# Patient Record
Sex: MALE | Race: BLACK OR AFRICAN AMERICAN | NOT HISPANIC OR LATINO
[De-identification: names, ages, dates, MRNs, and addresses within clinical notes are randomized per-mention and may not be internally consistent; named-entity substitution may affect disease eponyms.]

---

## 2017-01-10 ENCOUNTER — APPOINTMENT (OUTPATIENT)
Dept: THORACIC SURGERY | Facility: CLINIC | Age: 62
End: 2017-01-10

## 2017-01-10 VITALS
DIASTOLIC BLOOD PRESSURE: 80 MMHG | BODY MASS INDEX: 30.37 KG/M2 | HEIGHT: 66 IN | SYSTOLIC BLOOD PRESSURE: 140 MMHG | OXYGEN SATURATION: 98 % | RESPIRATION RATE: 12 BRPM | HEART RATE: 64 BPM | WEIGHT: 189 LBS

## 2017-01-28 ENCOUNTER — EMERGENCY (EMERGENCY)
Facility: HOSPITAL | Age: 62
LOS: 1 days | Discharge: HOME | End: 2017-01-28
Attending: EMERGENCY MEDICINE | Admitting: EMERGENCY MEDICINE
Payer: MEDICARE

## 2017-01-28 VITALS
RESPIRATION RATE: 18 BRPM | OXYGEN SATURATION: 98 % | TEMPERATURE: 98 F | SYSTOLIC BLOOD PRESSURE: 169 MMHG | HEART RATE: 84 BPM | DIASTOLIC BLOOD PRESSURE: 87 MMHG

## 2017-01-28 DIAGNOSIS — Z98.89 OTHER SPECIFIED POSTPROCEDURAL STATES: Chronic | ICD-10-CM

## 2017-01-28 LAB
ALBUMIN SERPL ELPH-MCNC: 4.2 G/DL — SIGNIFICANT CHANGE UP (ref 3.3–5)
ALP SERPL-CCNC: 73 U/L — SIGNIFICANT CHANGE UP (ref 40–120)
ALT FLD-CCNC: 28 U/L — SIGNIFICANT CHANGE UP (ref 4–41)
AST SERPL-CCNC: 23 U/L — SIGNIFICANT CHANGE UP (ref 4–40)
BASOPHILS # BLD AUTO: 0.04 K/UL — SIGNIFICANT CHANGE UP (ref 0–0.2)
BASOPHILS NFR BLD AUTO: 0.4 % — SIGNIFICANT CHANGE UP (ref 0–2)
BILIRUB SERPL-MCNC: 0.2 MG/DL — SIGNIFICANT CHANGE UP (ref 0.2–1.2)
BUN SERPL-MCNC: 11 MG/DL — SIGNIFICANT CHANGE UP (ref 7–23)
CALCIUM SERPL-MCNC: 9.3 MG/DL — SIGNIFICANT CHANGE UP (ref 8.4–10.5)
CHLORIDE SERPL-SCNC: 103 MMOL/L — SIGNIFICANT CHANGE UP (ref 98–107)
CK MB BLD-MCNC: 0.7 — SIGNIFICANT CHANGE UP (ref 0–2.5)
CK MB BLD-MCNC: 1.27 NG/ML — SIGNIFICANT CHANGE UP (ref 1–6.6)
CK SERPL-CCNC: 190 U/L — SIGNIFICANT CHANGE UP (ref 30–200)
CO2 SERPL-SCNC: 23 MMOL/L — SIGNIFICANT CHANGE UP (ref 22–31)
CREAT SERPL-MCNC: 0.89 MG/DL — SIGNIFICANT CHANGE UP (ref 0.5–1.3)
EOSINOPHIL # BLD AUTO: 0.41 K/UL — SIGNIFICANT CHANGE UP (ref 0–0.5)
EOSINOPHIL NFR BLD AUTO: 4.4 % — SIGNIFICANT CHANGE UP (ref 0–6)
GLUCOSE SERPL-MCNC: 93 MG/DL — SIGNIFICANT CHANGE UP (ref 70–99)
HCT VFR BLD CALC: 39.9 % — SIGNIFICANT CHANGE UP (ref 39–50)
HGB BLD-MCNC: 12.6 G/DL — LOW (ref 13–17)
IMM GRANULOCYTES NFR BLD AUTO: 0.3 % — SIGNIFICANT CHANGE UP (ref 0–1.5)
LYMPHOCYTES # BLD AUTO: 3.26 K/UL — SIGNIFICANT CHANGE UP (ref 1–3.3)
LYMPHOCYTES # BLD AUTO: 35.2 % — SIGNIFICANT CHANGE UP (ref 13–44)
MCHC RBC-ENTMCNC: 26.2 PG — LOW (ref 27–34)
MCHC RBC-ENTMCNC: 31.6 % — LOW (ref 32–36)
MCV RBC AUTO: 83 FL — SIGNIFICANT CHANGE UP (ref 80–100)
MONOCYTES # BLD AUTO: 0.78 K/UL — SIGNIFICANT CHANGE UP (ref 0–0.9)
MONOCYTES NFR BLD AUTO: 8.4 % — SIGNIFICANT CHANGE UP (ref 2–14)
NEUTROPHILS # BLD AUTO: 4.75 K/UL — SIGNIFICANT CHANGE UP (ref 1.8–7.4)
NEUTROPHILS NFR BLD AUTO: 51.3 % — SIGNIFICANT CHANGE UP (ref 43–77)
PLATELET # BLD AUTO: 270 K/UL — SIGNIFICANT CHANGE UP (ref 150–400)
PMV BLD: 9.8 FL — SIGNIFICANT CHANGE UP (ref 7–13)
POTASSIUM SERPL-MCNC: 3.6 MMOL/L — SIGNIFICANT CHANGE UP (ref 3.5–5.3)
POTASSIUM SERPL-SCNC: 3.6 MMOL/L — SIGNIFICANT CHANGE UP (ref 3.5–5.3)
PROT SERPL-MCNC: 7.3 G/DL — SIGNIFICANT CHANGE UP (ref 6–8.3)
RBC # BLD: 4.81 M/UL — SIGNIFICANT CHANGE UP (ref 4.2–5.8)
RBC # FLD: 14.3 % — SIGNIFICANT CHANGE UP (ref 10.3–14.5)
SODIUM SERPL-SCNC: 143 MMOL/L — SIGNIFICANT CHANGE UP (ref 135–145)
TROPONIN T SERPL-MCNC: < 0.06 NG/ML — SIGNIFICANT CHANGE UP (ref 0–0.06)
WBC # BLD: 9.27 K/UL — SIGNIFICANT CHANGE UP (ref 3.8–10.5)
WBC # FLD AUTO: 9.27 K/UL — SIGNIFICANT CHANGE UP (ref 3.8–10.5)

## 2017-01-28 PROCEDURE — 93010 ELECTROCARDIOGRAM REPORT: CPT | Mod: 59,GC

## 2017-01-28 PROCEDURE — 99284 EMERGENCY DEPT VISIT MOD MDM: CPT | Mod: 25,GC

## 2017-01-28 PROCEDURE — 71020: CPT | Mod: 26

## 2017-01-28 RX ORDER — ACETAMINOPHEN 500 MG
650 TABLET ORAL ONCE
Qty: 0 | Refills: 0 | Status: COMPLETED | OUTPATIENT
Start: 2017-01-28 | End: 2017-01-28

## 2017-01-28 RX ORDER — IPRATROPIUM/ALBUTEROL SULFATE 18-103MCG
3 AEROSOL WITH ADAPTER (GRAM) INHALATION ONCE
Qty: 0 | Refills: 0 | Status: COMPLETED | OUTPATIENT
Start: 2017-01-28 | End: 2017-01-28

## 2017-01-28 RX ORDER — NITROGLYCERIN 6.5 MG
0.4 CAPSULE, EXTENDED RELEASE ORAL ONCE
Qty: 0 | Refills: 0 | Status: COMPLETED | OUTPATIENT
Start: 2017-01-28 | End: 2017-01-28

## 2017-01-28 RX ORDER — AZITHROMYCIN 500 MG/1
500 TABLET, FILM COATED ORAL ONCE
Qty: 0 | Refills: 0 | Status: COMPLETED | OUTPATIENT
Start: 2017-01-28 | End: 2017-01-28

## 2017-01-28 RX ADMIN — AZITHROMYCIN 500 MILLIGRAM(S): 500 TABLET, FILM COATED ORAL at 20:39

## 2017-01-28 RX ADMIN — Medication 0.4 MILLIGRAM(S): at 23:44

## 2017-01-28 RX ADMIN — Medication 3 MILLILITER(S): at 19:45

## 2017-01-28 RX ADMIN — Medication 650 MILLIGRAM(S): at 19:45

## 2017-01-28 RX ADMIN — Medication 650 MILLIGRAM(S): at 23:59

## 2017-01-28 NOTE — ED PROVIDER NOTE - CARE PLAN
Principal Discharge DX:	Viral upper respiratory tract infection Principal Discharge DX:	Chest pain  Secondary Diagnosis:	URTI (acute upper respiratory infection)

## 2017-01-28 NOTE — ED ADULT NURSE REASSESSMENT NOTE - SYMPTOMS
states he feels much better, pain decreased to 2/10, no radiation of pain to lt arm. placed on cm to tele tech, cm sinus rythum  no noted ectopy./chest pain headache/states he feels much better, pain decreased to 2/10, no radiation of pain to lt arm. placed on cm to tele tech, cm sinus rythum  no noted ectopy. c.o. headache following NTG./chest pain
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
## 2017-01-28 NOTE — ED PROVIDER NOTE - OBJECTIVE STATEMENT
60 y/o M with PMHx of CAD, 2 MI, s/p 7 stents, BPH, HTN, s/p right upper lobectomy, emphysema, presents to the ED complaining of intermittent chest pain x2 weeks. Pain worse with deep inspiration and coughing, nonexertional, associated with shortness of breath. Also complaining of waxing and waning cold symptoms, including cough, chills, chest congestion, headache, x5 weeks. Denies fever, nausea, vomiting. Recently saw his thoracic surgeon, had a CT chest performed on 1/10/17. Denies recent travel. Next cardiology appointment is 2/15.    Thoracic Surgeon: Miguel Angel Osborne [phone] 60 y/o M with PMHx of CAD, 2 MI, s/p 7 stents, BPH, HTN, s/p right upper lobectomy, emphysema, presents to the ED complaining of intermittent chest pain x2 weeks. Pain worse with deep inspiration and coughing, nonexertional, associated with shortness of breath. Also complaining of waxing and waning cold symptoms, including cough, chills, chest congestion, headache, x5 weeks. Denies fever, nausea, vomiting. Recently saw his thoracic surgeon, had a CT chest performed on 1/10/17. Denies recent travel. Next cardiology appointment is 2/15. Allergic to Percocet and Vicodin.    Thoracic Surgeon: Miguel Angel Osborne [phone] 60 y/o M with PMHx of CAD, 2 MI, s/p 7 stents, BPH, HTN, s/p right upper lobectomy, emphysema, presents to the ED complaining of intermittent chest pain x2 weeks. Pain worse with deep inspiration and coughing, nonexertional, associated with shortness of breath. Also complaining of waxing and waning cold symptoms, including cough, chills, chest congestion, headache, x5 weeks. Denies fever, nausea, vomiting. Recently saw his thoracic surgeon*, had an updated CT chest performed on 1/10/17- no acute changes as per pt. URI sx's started to worsen after the CT chest was performed. Denies recent travel. Next cardiology appointment is 2/15. Allergic to Percocet and Vicodin.   PMD - Bob Richards- away on vacation- patient tried to reach- returns next week.    *Thoracic Surgeon: Miguel Angel Osborne [phone] 62 y/o M with PMHx of CAD, 2 MI, s/p 7 stents, BPH, HTN, s/p right upper lobectomy, emphysema, presents to the ED complaining of intermittent chest pain x2 weeks. Pain worse with deep inspiration and coughing, nonexertional, associated with shortness of breath. Also complaining of waxing and waning cold symptoms, including cough, chills, chest congestion, headache, and wheezing x5 weeks. Denies fever, nausea, vomiting. Recently saw his thoracic surgeon*, had an updated CT chest performed on 1/10/17- no acute changes as per pt. URI sx's started to worsen after the CT chest was performed. Denies recent travel. Next cardiology appointment is 2/15. Allergic to Percocet and Vicodin.   PMD - Bob Richards- away on vacation- patient tried to reach- returns next week.    *Thoracic Surgeon: Miguel Angel Osborne [phone]

## 2017-01-28 NOTE — ED PROVIDER NOTE - MEDICAL DECISION MAKING DETAILS
60 y/o M with history of CAD, MI, BPH, HTN, with chest pain and shortness of breath. Plan: nebulizer treatment, chest x-ray, Tylenol. 60 y/o M with history of CAD, MI, BPH, HTN, with chest pain and shortness of breath. Plan: nebulizer treatment, chest x-ray, labs, Troponin, Tylenol. 62 y/o M with history of CAD, MI, BPH, HTN, with chest pain and shortness of breath with recent CT Chest normal. Plan: nebulizer treatment, chest x-ray, labs, Troponin, Tylenol, reassess

## 2017-01-28 NOTE — ED PROVIDER NOTE - PSH
Acquired Toe Deformity  congential deformity treated with surgery  History of Tonsillectomy  childhood  Injury, Thumbs  right thumb torn ligament, 2003  S/P arthroscopy of right knee  2005  S/P biopsy  right lung, July, 2013  S/P lobectomy of lung    S/P shoulder surgery  left, 2012

## 2017-01-28 NOTE — ED ADULT NURSE REASSESSMENT NOTE - REASSESS COMMUNICATION
inpatient nurse report called/report to oncoming rn at end of shift/ED physician notified provider aware of headache, tylenol given, report to oncoming rn at end of shift/inpatient nurse report called/ED physician notified

## 2017-01-28 NOTE — ED ADULT NURSE REASSESSMENT NOTE - REASSESS COMMUNICATION
pain decreased after sl NTG and tingling in lt arm resolved. cm to tele tech. sinus rythum no noted ectopy, skin warm and dry./ED physician notified

## 2017-01-28 NOTE — ED PROVIDER NOTE - NS ED MD SCRIBE ATTENDING SCRIBE SECTIONS
PROGRESS NOTE/RESULTS/PAST MEDICAL/SURGICAL/SOCIAL HISTORY/VITAL SIGNS( Pullset)/DISPOSITION/HIV/PHYSICAL EXAM/REVIEW OF SYSTEMS/HISTORY OF PRESENT ILLNESS

## 2017-01-28 NOTE — ED PROVIDER NOTE - PROGRESS NOTE DETAILS
The scribe's documentation has been prepared under my direction and personally reviewed by me in its entirety. I confirm that the note above accurately reflects all work, treatment, procedures, and medical decision making performed by me.  Carla Diaz PA-C REGIS Diaz- pt stable. Thus far CBC WNL still awaiting other lab results including trop.  Pt awaiting CXR, receiving neb tx. Signed out to REGIS Chavarria- plan to repeat 2nd CE at 10:00pm. Pt arrival to ED was 4pm and DC with Private Cardio and Private Pulm follow up, Zithromax, Albuterol, chest pain precautions. REGIS Chavarria: Pt reassessed at 10pm, initially felt better after duonebs, but upon getting 2nd CE and EKG drawn, pt started having substernal chest pain. On exam, RRR S2S2, pain reproducible on palpation of costal margins. Pt was given NTG and pain almost completely resolved. Tried to get a hold of Dr. Mcnamara PMD multiple times however answering service said there is no way to get a hold of him and suggested getting the hospitalist to admit him if necessary. Discussed plan of admission vs. obs with pt. Pt states his last cath was definitely >3 years ago and saw  >2.5 years ago for stress. Although pain is likely costochondritis, will send to cdu for obs and stress test in the am. Pt ok with staying in the CDU.

## 2017-01-28 NOTE — ED ADULT TRIAGE NOTE - CHIEF COMPLAINT QUOTE
Patient has c/o headache, chills, cough, difficulty breathing and chest pain that he has had since December 17th that he has tried to self medicate with no relief.

## 2017-01-29 VITALS
TEMPERATURE: 98 F | SYSTOLIC BLOOD PRESSURE: 166 MMHG | RESPIRATION RATE: 16 BRPM | DIASTOLIC BLOOD PRESSURE: 72 MMHG | OXYGEN SATURATION: 99 % | HEART RATE: 99 BPM

## 2017-01-29 LAB
CK MB BLD-MCNC: 0.7 — SIGNIFICANT CHANGE UP (ref 0–2.5)
CK MB BLD-MCNC: 1.17 NG/ML — SIGNIFICANT CHANGE UP (ref 1–6.6)
CK SERPL-CCNC: 165 U/L — SIGNIFICANT CHANGE UP (ref 30–200)
HBA1C BLD-MCNC: 6.6 % — HIGH (ref 4–5.6)
TROPONIN T SERPL-MCNC: < 0.06 NG/ML — SIGNIFICANT CHANGE UP (ref 0–0.06)
TROPONIN T SERPL-MCNC: < 0.06 NG/ML — SIGNIFICANT CHANGE UP (ref 0–0.06)

## 2017-01-29 PROCEDURE — 93016 CV STRESS TEST SUPVJ ONLY: CPT | Mod: GC

## 2017-01-29 PROCEDURE — 78452 HT MUSCLE IMAGE SPECT MULT: CPT | Mod: 26

## 2017-01-29 PROCEDURE — 93018 CV STRESS TEST I&R ONLY: CPT | Mod: GC

## 2017-01-29 RX ORDER — TAMSULOSIN HYDROCHLORIDE 0.4 MG/1
0.4 CAPSULE ORAL AT BEDTIME
Qty: 0 | Refills: 0 | Status: DISCONTINUED | OUTPATIENT
Start: 2017-01-29 | End: 2017-02-01

## 2017-01-29 RX ORDER — CLOPIDOGREL BISULFATE 75 MG/1
75 TABLET, FILM COATED ORAL DAILY
Qty: 0 | Refills: 0 | Status: DISCONTINUED | OUTPATIENT
Start: 2017-01-29 | End: 2017-02-01

## 2017-01-29 RX ORDER — FLUTICASONE PROPIONATE 50 MCG
1 SPRAY, SUSPENSION NASAL
Qty: 1 | Refills: 0 | OUTPATIENT
Start: 2017-01-29 | End: 2017-02-28

## 2017-01-29 RX ORDER — PANTOPRAZOLE SODIUM 20 MG/1
40 TABLET, DELAYED RELEASE ORAL
Qty: 0 | Refills: 0 | Status: DISCONTINUED | OUTPATIENT
Start: 2017-01-29 | End: 2017-02-01

## 2017-01-29 RX ORDER — AZITHROMYCIN 500 MG/1
1 TABLET, FILM COATED ORAL
Qty: 1 | Refills: 0 | OUTPATIENT
Start: 2017-01-29

## 2017-01-29 RX ORDER — ASPIRIN/CALCIUM CARB/MAGNESIUM 324 MG
81 TABLET ORAL DAILY
Qty: 0 | Refills: 0 | Status: DISCONTINUED | OUTPATIENT
Start: 2017-01-29 | End: 2017-01-29

## 2017-01-29 RX ORDER — ASPIRIN/CALCIUM CARB/MAGNESIUM 324 MG
81 TABLET ORAL DAILY
Qty: 0 | Refills: 0 | Status: DISCONTINUED | OUTPATIENT
Start: 2017-01-29 | End: 2017-02-01

## 2017-01-29 RX ORDER — METOPROLOL TARTRATE 50 MG
50 TABLET ORAL
Qty: 0 | Refills: 0 | Status: DISCONTINUED | OUTPATIENT
Start: 2017-01-29 | End: 2017-02-01

## 2017-01-29 RX ORDER — LOSARTAN POTASSIUM 100 MG/1
100 TABLET, FILM COATED ORAL DAILY
Qty: 0 | Refills: 0 | Status: DISCONTINUED | OUTPATIENT
Start: 2017-01-29 | End: 2017-02-01

## 2017-01-29 RX ADMIN — CLOPIDOGREL BISULFATE 75 MILLIGRAM(S): 75 TABLET, FILM COATED ORAL at 14:40

## 2017-01-29 RX ADMIN — PANTOPRAZOLE SODIUM 40 MILLIGRAM(S): 20 TABLET, DELAYED RELEASE ORAL at 14:40

## 2017-01-29 RX ADMIN — Medication 81 MILLIGRAM(S): at 14:40

## 2017-01-29 RX ADMIN — Medication 650 MILLIGRAM(S): at 00:48

## 2017-01-29 NOTE — ED CDU PROVIDER NOTE - PLAN OF CARE
FOLLOW UP WITH YOUR PRIMARY DOCTOR AS SOON AS POSSIBLE  FOLLOW UP WITH YOUR CARDIOLOGIST  TAKE YOUR LAB AND DIAGNOSTIC RESULTS TO YOUR APPOINTMENT  CONTINUE TAKING ALL ROUTINE MEDICATIONS  TAKE AS PRESCRIBED ZITHROMAX, FLONASE AND TESSALON REGINALD  RETURN TO ED IF SYMPTOMS RETURNS OR WORSENS

## 2017-01-29 NOTE — ED ADULT NURSE NOTE - OBJECTIVE STATEMENT
Pt received to rm 7 aaox4 ambulatory c/o cough and diffuse chest pain x 2 weeks.  Pt reports feeling "pins and needles" and SOB beginning last night in the ED.  Pt reports hx of heart disease with 7 stents, and Lung CA resolved in 2013 with R.upper lobe removal.  Pt reports hx of low-grade prostate CA.  Labs as ordered.  Pt chest pain increased while in the ED:Resolved with Nitro.  Pt now denies pain, SOB or pins and needles.  Pt has negative CE x 2 and is NSR on monitor.  Will endorse report to PARVEEN Ortiz.

## 2017-01-29 NOTE — ED CDU PROVIDER NOTE - ATTENDING CONTRIBUTION TO CARE
Michaela Dr. Mar: I performed a face to face bedside interview with patient regarding history of present illness, review of symptoms and past medical history. I completed an independent physical exam.  I have discussed patient's plan of care with PA.   I agree with note as stated above, having amended the EMR as needed to reflect my findings.   This includes HISTORY OF PRESENT ILLNESS, HIV, PAST MEDICAL/SURGICAL/FAMILY/SOCIAL HISTORY, ALLERGIES AND HOME MEDICATIONS, REVIEW OF SYSTEMS, PHYSICAL EXAM, and any PROGRESS NOTES during the time I functioned as the attending physician for this patient.  61M h/o 2 MIs, 7 stents p/w pleuritic cp s/p URI. CP similar to MI pain. Exam unremarkable. Plan - Jenise and stress

## 2017-01-29 NOTE — ED ADULT NURSE REASSESSMENT NOTE - NS ED NURSE REASSESS COMMENT FT1
Break coverage RN received report from intake RN pt is CDU boarder, VS noted, appears to be resting comfortably, denies any complaints at this time, will endorse to primary RN.

## 2017-01-29 NOTE — ED CDU PROVIDER NOTE - PROGRESS NOTE DETAILS
REGIS Chavarria: Pt reassessed at bedside, NAD, VSS on tele, sleeping comfortably, no complaints of CP, CEx2 negative, pending 3rd set. Will continue to monitor and follow with stress test in the AM. Dr. Mar: Pt seen and examined, currently pain free. States he has been having exertional L sided chest pressure radiating to the LUE and DIAZ for the past 2 weeks. Has h/o 7 cardiac stents in the past, unsure if current sx are similar or not. Also c/o 2 weeks of dry cough, no fevers. Awaiting stress test. Dr. Mar: Pt doing well, stress test normal, will dc home on Zpack. Stable for dc and f/u with Dr. Mcnamara as outpatient.

## 2017-01-29 NOTE — ED ADULT NURSE NOTE - FAMILY HISTORY
Father  Still living? Unknown  Family history of prostate cancer in father, Age at diagnosis: Age Unknown     Mother  Still living? Unknown  Family history of coronary artery disease in mother, Age at diagnosis: Age Unknown

## 2017-01-29 NOTE — ED CDU PROVIDER NOTE - OBJECTIVE STATEMENT
60 y/o M with PMHx of CAD, 2 MI, s/p 7 stents, BPH, HTN, s/p right upper lobectomy, emphysema, presents to the ED complaining of intermittent chest pain x2 weeks. Pain worse with deep inspiration and coughing, nonexertional, associated with shortness of breath. Also complaining of waxing and waning cold symptoms, including cough, chills, chest congestion, headache, and wheezing x5 weeks. Denies fever, nausea, vomiting. Recently saw his thoracic surgeon*, had an updated CT chest performed on 1/10/17- no acute changes as per pt. URI sx's started to worsen after the CT chest was performed. Denies recent travel. Next cardiology appointment is 2/15. Allergic to Percocet and Vicodin.   PMD - Bob Richards- away on vacation- patient tried to reach- returns next week.    *Thoracic Surgeon: Miguel Angel Osborne [phone]    CDU REGIS Chavarria: Agree with above. Pt is 60 y/o M hx CAD p/w chest pain and URI sx. Intially received sign out from REGIS Diaz to reassess pt and dc home after negative Troponins with supportive therapy for his URI, however upon discharge pt started co substernal chest pain. Pain was reproducible upon palpation of costal margins, however the pain did improve after 1 SL nitro. Primary team unable to reach pts cardiologist Dr. Mcnamara, so the decision was made to keep the patient in the observation unit for serial CE and stress test in the am. Pt was agreeable to plan. Now c/o headache post NTG.

## 2017-05-28 ENCOUNTER — EMERGENCY (EMERGENCY)
Facility: HOSPITAL | Age: 62
LOS: 1 days | Discharge: HOME | End: 2017-05-28
Attending: EMERGENCY MEDICINE | Admitting: EMERGENCY MEDICINE
Payer: MEDICARE

## 2017-05-28 VITALS
SYSTOLIC BLOOD PRESSURE: 174 MMHG | DIASTOLIC BLOOD PRESSURE: 97 MMHG | OXYGEN SATURATION: 97 % | TEMPERATURE: 100 F | RESPIRATION RATE: 18 BRPM | HEART RATE: 89 BPM

## 2017-05-28 VITALS
RESPIRATION RATE: 16 BRPM | DIASTOLIC BLOOD PRESSURE: 77 MMHG | HEART RATE: 76 BPM | SYSTOLIC BLOOD PRESSURE: 136 MMHG | OXYGEN SATURATION: 100 %

## 2017-05-28 DIAGNOSIS — Z98.89 OTHER SPECIFIED POSTPROCEDURAL STATES: Chronic | ICD-10-CM

## 2017-05-28 LAB
ALBUMIN SERPL ELPH-MCNC: 4.3 G/DL — SIGNIFICANT CHANGE UP (ref 3.3–5)
ALP SERPL-CCNC: 79 U/L — SIGNIFICANT CHANGE UP (ref 40–120)
ALT FLD-CCNC: 27 U/L — SIGNIFICANT CHANGE UP (ref 4–41)
APTT BLD: 30.2 SEC — SIGNIFICANT CHANGE UP (ref 27.5–37.4)
AST SERPL-CCNC: 25 U/L — SIGNIFICANT CHANGE UP (ref 4–40)
BASOPHILS # BLD AUTO: 0.02 K/UL — SIGNIFICANT CHANGE UP (ref 0–0.2)
BASOPHILS NFR BLD AUTO: 0.2 % — SIGNIFICANT CHANGE UP (ref 0–2)
BILIRUB SERPL-MCNC: 0.3 MG/DL — SIGNIFICANT CHANGE UP (ref 0.2–1.2)
BUN SERPL-MCNC: 13 MG/DL — SIGNIFICANT CHANGE UP (ref 7–23)
CALCIUM SERPL-MCNC: 9.1 MG/DL — SIGNIFICANT CHANGE UP (ref 8.4–10.5)
CHLORIDE SERPL-SCNC: 97 MMOL/L — LOW (ref 98–107)
CK MB BLD-MCNC: 0.7 — SIGNIFICANT CHANGE UP (ref 0–2.5)
CK MB BLD-MCNC: 1.21 NG/ML — SIGNIFICANT CHANGE UP (ref 1–6.6)
CK SERPL-CCNC: 185 U/L — SIGNIFICANT CHANGE UP (ref 30–200)
CO2 SERPL-SCNC: 26 MMOL/L — SIGNIFICANT CHANGE UP (ref 22–31)
CREAT SERPL-MCNC: 1.08 MG/DL — SIGNIFICANT CHANGE UP (ref 0.5–1.3)
EOSINOPHIL # BLD AUTO: 0.18 K/UL — SIGNIFICANT CHANGE UP (ref 0–0.5)
EOSINOPHIL NFR BLD AUTO: 2.2 % — SIGNIFICANT CHANGE UP (ref 0–6)
GLUCOSE SERPL-MCNC: 247 MG/DL — HIGH (ref 70–99)
HCT VFR BLD CALC: 42.1 % — SIGNIFICANT CHANGE UP (ref 39–50)
HGB BLD-MCNC: 13.2 G/DL — SIGNIFICANT CHANGE UP (ref 13–17)
IMM GRANULOCYTES NFR BLD AUTO: 0.2 % — SIGNIFICANT CHANGE UP (ref 0–1.5)
INR BLD: 0.96 — SIGNIFICANT CHANGE UP (ref 0.88–1.17)
LYMPHOCYTES # BLD AUTO: 2.17 K/UL — SIGNIFICANT CHANGE UP (ref 1–3.3)
LYMPHOCYTES # BLD AUTO: 26.4 % — SIGNIFICANT CHANGE UP (ref 13–44)
MCHC RBC-ENTMCNC: 26.7 PG — LOW (ref 27–34)
MCHC RBC-ENTMCNC: 31.4 % — LOW (ref 32–36)
MCV RBC AUTO: 85.2 FL — SIGNIFICANT CHANGE UP (ref 80–100)
MONOCYTES # BLD AUTO: 0.82 K/UL — SIGNIFICANT CHANGE UP (ref 0–0.9)
MONOCYTES NFR BLD AUTO: 10 % — SIGNIFICANT CHANGE UP (ref 2–14)
NEUTROPHILS # BLD AUTO: 5.01 K/UL — SIGNIFICANT CHANGE UP (ref 1.8–7.4)
NEUTROPHILS NFR BLD AUTO: 61 % — SIGNIFICANT CHANGE UP (ref 43–77)
PLATELET # BLD AUTO: 273 K/UL — SIGNIFICANT CHANGE UP (ref 150–400)
PMV BLD: 10.5 FL — SIGNIFICANT CHANGE UP (ref 7–13)
POTASSIUM SERPL-MCNC: 3.5 MMOL/L — SIGNIFICANT CHANGE UP (ref 3.5–5.3)
POTASSIUM SERPL-SCNC: 3.5 MMOL/L — SIGNIFICANT CHANGE UP (ref 3.5–5.3)
PROT SERPL-MCNC: 7.6 G/DL — SIGNIFICANT CHANGE UP (ref 6–8.3)
PROTHROM AB SERPL-ACNC: 10.7 SEC — SIGNIFICANT CHANGE UP (ref 9.8–13.1)
RBC # BLD: 4.94 M/UL — SIGNIFICANT CHANGE UP (ref 4.2–5.8)
RBC # FLD: 13.9 % — SIGNIFICANT CHANGE UP (ref 10.3–14.5)
SODIUM SERPL-SCNC: 139 MMOL/L — SIGNIFICANT CHANGE UP (ref 135–145)
TROPONIN T SERPL-MCNC: < 0.06 NG/ML — SIGNIFICANT CHANGE UP (ref 0–0.06)
WBC # BLD: 8.22 K/UL — SIGNIFICANT CHANGE UP (ref 3.8–10.5)
WBC # FLD AUTO: 8.22 K/UL — SIGNIFICANT CHANGE UP (ref 3.8–10.5)

## 2017-05-28 PROCEDURE — 93010 ELECTROCARDIOGRAM REPORT: CPT

## 2017-05-28 PROCEDURE — 99285 EMERGENCY DEPT VISIT HI MDM: CPT | Mod: 25,GC

## 2017-05-28 PROCEDURE — 71020: CPT | Mod: 26

## 2017-05-28 PROCEDURE — 70450 CT HEAD/BRAIN W/O DYE: CPT | Mod: 26

## 2017-05-28 RX ORDER — IBUPROFEN 200 MG
400 TABLET ORAL ONCE
Qty: 0 | Refills: 0 | Status: COMPLETED | OUTPATIENT
Start: 2017-05-28 | End: 2017-05-28

## 2017-05-28 RX ORDER — METOCLOPRAMIDE HCL 10 MG
10 TABLET ORAL ONCE
Qty: 0 | Refills: 0 | Status: COMPLETED | OUTPATIENT
Start: 2017-05-28 | End: 2017-05-28

## 2017-05-28 RX ADMIN — Medication 400 MILLIGRAM(S): at 14:06

## 2017-05-28 RX ADMIN — Medication 400 MILLIGRAM(S): at 12:59

## 2017-05-28 RX ADMIN — Medication 10 MILLIGRAM(S): at 12:12

## 2017-05-28 NOTE — ED PROVIDER NOTE - OBJECTIVE STATEMENT
62 y/o male with PMHx ot HTN, CAD s/p 7 stents, Lung CA s/p lobectomy, HLD, and Prostate CA presents to ED for headache and elevated BP. Pt states since his insurance stopped covering Benicar his PMD has been trying to find the right medication to control his BP. Last month pt was started on Losartan 100mg and over the past 10 days was changed to Losartan 100mg/HCTZ 25mg. Pt states having headaches since being changed to Losartan last month with SBP as high as 180s. Pt denies any vision changes, fever, chills, SOB, nausea, vomiting, or  abd pain. Pt admits to right sided chest pain over the past 2 days.

## 2017-05-28 NOTE — ED PROVIDER NOTE - PROGRESS NOTE DETAILS
headache improved with ibuprofen, CT head negative. bp controlled. headache improved with ibuprofen, CT head negative. bp controlled. Ambulating without any difficulty and tolerating PO. Advised f/u with PMD for repeat BP and monitoring. Advised Tylenol 650mg every 4 hours as needed for pain.

## 2017-05-28 NOTE — ED ADULT NURSE NOTE - OBJECTIVE STATEMENT
Patient received into room 24 AA&Ox3 c/o headache with elevated BP - pt. states that his PMD has been trying to find right medication for BP after insurance stopped covering Benicar to control BP 1 month ago - pt. states that SBP has been in the 180's recently after starting losartan. VSS on RA. NSR upon scope of monitor. Patient denies chest pain, N/V, SOB, fever, chills at this time. 20g PIV in place to right AC, labs drawn, medications administered - will continue to monitor.

## 2017-05-28 NOTE — ED ADULT TRIAGE NOTE - CHIEF COMPLAINT QUOTE
Pt c/o HTN and HA for 10 days, R side CP x2 days. Pt states "my doctor changed my BP meds, he is trying to find the right combination" Pt appears in no distress.   PMH: HTN, MI, cardiac stents x7, prostate ca (current- not on any tx), lung ca w/ right upper lobectomy (2013)

## 2017-05-28 NOTE — ED PROVIDER NOTE - MEDICAL DECISION MAKING DETAILS
62 y/o male with history of HTN with recent changes to meds over the past month and persistent headache X 1 month, elevated BP, and right sided chest pain X 2 days. Likely HTN related. R/o ICB. CT, EKG, labs, Analgesia.

## 2017-05-28 NOTE — ED PROVIDER NOTE - CARE PLAN
Principal Discharge DX:	Tension headache Principal Discharge DX:	Tension headache  Secondary Diagnosis:	Essential hypertension

## 2017-05-28 NOTE — ED PROVIDER NOTE - ATTENDING CONTRIBUTION TO CARE
Pt was seen and evaluated by me. Pt states since his insurance stopped covering Benicar he has been on Losartan 100mg starting last month and then Losartan 100mg/HCTZ 25mg 10 days ago. Pt states over the past month having intermittent headache, generalized, noted with elevated BP with SBP max of 180s. Pt also notes over the past 2 days having right sided chest discomfort. Denies any numbness, vision changes, fever, chills, nausea, vomiting, SOB, LE swelling, or abd pain. Lungs CTA b/l. RRR. Abd soft, non-tender. No focal deficits.

## 2017-07-25 ENCOUNTER — APPOINTMENT (OUTPATIENT)
Dept: CT IMAGING | Facility: IMAGING CENTER | Age: 62
End: 2017-07-25

## 2017-07-25 ENCOUNTER — APPOINTMENT (OUTPATIENT)
Dept: THORACIC SURGERY | Facility: CLINIC | Age: 62
End: 2017-07-25

## 2017-07-25 ENCOUNTER — OUTPATIENT (OUTPATIENT)
Dept: OUTPATIENT SERVICES | Facility: HOSPITAL | Age: 62
LOS: 1 days | End: 2017-07-25
Payer: MEDICARE

## 2017-07-25 VITALS
HEART RATE: 85 BPM | HEIGHT: 66 IN | BODY MASS INDEX: 30.37 KG/M2 | OXYGEN SATURATION: 99 % | RESPIRATION RATE: 16 BRPM | SYSTOLIC BLOOD PRESSURE: 120 MMHG | WEIGHT: 189 LBS | DIASTOLIC BLOOD PRESSURE: 80 MMHG

## 2017-07-25 DIAGNOSIS — Z98.89 OTHER SPECIFIED POSTPROCEDURAL STATES: Chronic | ICD-10-CM

## 2017-07-25 DIAGNOSIS — C34.90 MALIGNANT NEOPLASM OF UNSPECIFIED PART OF UNSPECIFIED BRONCHUS OR LUNG: ICD-10-CM

## 2017-07-25 PROCEDURE — 71250 CT THORAX DX C-: CPT

## 2018-01-23 ENCOUNTER — APPOINTMENT (OUTPATIENT)
Dept: THORACIC SURGERY | Facility: CLINIC | Age: 63
End: 2018-01-23
Payer: MEDICARE

## 2018-01-23 VITALS
HEIGHT: 66 IN | HEART RATE: 66 BPM | BODY MASS INDEX: 32.62 KG/M2 | OXYGEN SATURATION: 98 % | SYSTOLIC BLOOD PRESSURE: 152 MMHG | DIASTOLIC BLOOD PRESSURE: 80 MMHG | WEIGHT: 203 LBS

## 2018-01-23 PROCEDURE — 99212 OFFICE O/P EST SF 10 MIN: CPT

## 2018-01-23 RX ORDER — ASPIRIN 81 MG
81 TABLET, DELAYED RELEASE (ENTERIC COATED) ORAL
Refills: 0 | Status: ACTIVE | COMMUNITY

## 2018-02-14 ENCOUNTER — APPOINTMENT (OUTPATIENT)
Dept: NUCLEAR MEDICINE | Facility: IMAGING CENTER | Age: 63
End: 2018-02-14
Payer: MEDICARE

## 2018-02-14 ENCOUNTER — OUTPATIENT (OUTPATIENT)
Dept: OUTPATIENT SERVICES | Facility: HOSPITAL | Age: 63
LOS: 1 days | End: 2018-02-14
Payer: MEDICARE

## 2018-02-14 DIAGNOSIS — C34.90 MALIGNANT NEOPLASM OF UNSPECIFIED PART OF UNSPECIFIED BRONCHUS OR LUNG: ICD-10-CM

## 2018-02-14 DIAGNOSIS — Z98.89 OTHER SPECIFIED POSTPROCEDURAL STATES: Chronic | ICD-10-CM

## 2018-02-14 PROCEDURE — 78815 PET IMAGE W/CT SKULL-THIGH: CPT | Mod: 26,PS

## 2018-02-14 PROCEDURE — 78815 PET IMAGE W/CT SKULL-THIGH: CPT

## 2018-02-14 PROCEDURE — A9552: CPT

## 2018-07-11 ENCOUNTER — FORM ENCOUNTER (OUTPATIENT)
Age: 63
End: 2018-07-11

## 2018-07-12 ENCOUNTER — OUTPATIENT (OUTPATIENT)
Dept: OUTPATIENT SERVICES | Facility: HOSPITAL | Age: 63
LOS: 1 days | End: 2018-07-12
Payer: MEDICARE

## 2018-07-12 ENCOUNTER — APPOINTMENT (OUTPATIENT)
Dept: RADIOLOGY | Facility: IMAGING CENTER | Age: 63
End: 2018-07-12

## 2018-07-12 DIAGNOSIS — Z98.89 OTHER SPECIFIED POSTPROCEDURAL STATES: Chronic | ICD-10-CM

## 2018-07-12 DIAGNOSIS — C34.90 MALIGNANT NEOPLASM OF UNSPECIFIED PART OF UNSPECIFIED BRONCHUS OR LUNG: ICD-10-CM

## 2018-07-12 PROCEDURE — 71046 X-RAY EXAM CHEST 2 VIEWS: CPT

## 2018-07-12 PROCEDURE — 71046 X-RAY EXAM CHEST 2 VIEWS: CPT | Mod: 26

## 2018-07-24 ENCOUNTER — APPOINTMENT (OUTPATIENT)
Dept: THORACIC SURGERY | Facility: CLINIC | Age: 63
End: 2018-07-24
Payer: MEDICARE

## 2018-07-24 VITALS
RESPIRATION RATE: 18 BRPM | DIASTOLIC BLOOD PRESSURE: 70 MMHG | TEMPERATURE: 98.2 F | BODY MASS INDEX: 32.14 KG/M2 | SYSTOLIC BLOOD PRESSURE: 110 MMHG | HEIGHT: 66 IN | HEART RATE: 66 BPM | WEIGHT: 200 LBS | OXYGEN SATURATION: 98 %

## 2018-07-24 PROCEDURE — 99214 OFFICE O/P EST MOD 30 MIN: CPT

## 2018-07-24 RX ORDER — HYDRALAZINE HYDROCHLORIDE 50 MG/1
TABLET ORAL
Refills: 0 | Status: DISCONTINUED | COMMUNITY
End: 2018-07-24

## 2019-02-15 ENCOUNTER — APPOINTMENT (OUTPATIENT)
Dept: PULMONOLOGY | Facility: CLINIC | Age: 64
End: 2019-02-15
Payer: MEDICARE

## 2019-02-15 VITALS — SYSTOLIC BLOOD PRESSURE: 146 MMHG | DIASTOLIC BLOOD PRESSURE: 80 MMHG

## 2019-02-15 VITALS
RESPIRATION RATE: 16 BRPM | HEART RATE: 104 BPM | SYSTOLIC BLOOD PRESSURE: 162 MMHG | DIASTOLIC BLOOD PRESSURE: 92 MMHG | TEMPERATURE: 98.3 F | OXYGEN SATURATION: 98 %

## 2019-02-15 DIAGNOSIS — Z87.09 PERSONAL HISTORY OF OTHER DISEASES OF THE RESPIRATORY SYSTEM: ICD-10-CM

## 2019-02-15 DIAGNOSIS — R06.02 SHORTNESS OF BREATH: ICD-10-CM

## 2019-02-15 LAB — POCT - HEMOGLOBIN (HGB), QUANTITATIVE, TRANSCUTANEOUS: 12.2

## 2019-02-15 PROCEDURE — 94060 EVALUATION OF WHEEZING: CPT

## 2019-02-15 PROCEDURE — 94729 DIFFUSING CAPACITY: CPT

## 2019-02-15 PROCEDURE — 94727 GAS DIL/WSHOT DETER LNG VOL: CPT

## 2019-02-15 PROCEDURE — 90686 IIV4 VACC NO PRSV 0.5 ML IM: CPT

## 2019-02-15 PROCEDURE — 88738 HGB QUANT TRANSCUTANEOUS: CPT

## 2019-02-15 PROCEDURE — G0008: CPT

## 2019-02-15 PROCEDURE — 99214 OFFICE O/P EST MOD 30 MIN: CPT | Mod: 25

## 2019-02-16 NOTE — REASON FOR VISIT
[Follow-Up] : a follow-up visit [Lung Cancer] : lung cancer [Shortness of Breath] : shortness of Breath

## 2019-02-16 NOTE — PHYSICAL EXAM
[General Appearance - Well Developed] : well developed [General Appearance - Well Nourished] : well nourished [Normal Conjunctiva] : the conjunctiva exhibited no abnormalities [Normal Oropharynx] : normal oropharynx [FreeTextEntry1] : Mild upper airway congestion [Heart Sounds] : normal S1 and S2 [Murmurs] : no murmurs present [Auscultation Breath Sounds / Voice Sounds] : lungs were clear to auscultation bilaterally [Lungs Percussion] : the lungs were normal to percussion [Abdomen Soft] : soft [] : no hepato-splenomegaly [Nail Clubbing] : no clubbing of the fingernails [Cyanosis, Localized] : no localized cyanosis

## 2019-02-16 NOTE — CONSULT LETTER
[Dear  ___] : Dear ~JESSICA, [Consult Letter:] : I had the pleasure of evaluating your patient, [unfilled]. [Consult Closing:] : Thank you very much for allowing me to participate in the care of this patient.  If you have any questions, please do not hesitate to contact me. [Sincerely,] : Sincerely, [FreeTextEntry2] : Bob Mcnamara MD\par  [FreeTextEntry3] : Reagan Morales MD FCCP\par

## 2019-02-16 NOTE — ASSESSMENT
[FreeTextEntry1] : Lingering URI possible sinusitis.\par \par Some increased SOB ? related to weight and conditioning. Receiving cardiac evaluation. No significant change in pulmonary status.\par \par Seeing Zeltsman and followed with CT for lung CA. Due f/u CT.\par \par Continue bronchodilator therapy.

## 2019-02-16 NOTE — PROCEDURE
[FreeTextEntry1] : Pulmonary function testing.\par These data demonstrate a mild-moderate obstructive ventilatory deficit. There is no significant bronchodilator response. There is elevation in the RV/TLC ratio indicative of possible air trapping. Diffusion capacity is normal. \par \par \par \par

## 2019-02-16 NOTE — HISTORY OF PRESENT ILLNESS
[FreeTextEntry1] : Some lingering resp. issues since 2/6. \par Head congestion\par Some cough. Dry. \par Wheezing and SOB even before above. \par \par Has gained weight.

## 2019-02-19 ENCOUNTER — INPATIENT (INPATIENT)
Facility: HOSPITAL | Age: 64
LOS: 1 days | Discharge: HOME | DRG: 247 | End: 2019-02-21
Attending: INTERNAL MEDICINE | Admitting: INTERNAL MEDICINE
Payer: MEDICARE

## 2019-02-19 VITALS
SYSTOLIC BLOOD PRESSURE: 164 MMHG | DIASTOLIC BLOOD PRESSURE: 93 MMHG | OXYGEN SATURATION: 99 % | TEMPERATURE: 98 F | HEART RATE: 67 BPM | HEIGHT: 68 IN | RESPIRATION RATE: 18 BRPM | WEIGHT: 203.93 LBS

## 2019-02-19 DIAGNOSIS — Z98.89 OTHER SPECIFIED POSTPROCEDURAL STATES: Chronic | ICD-10-CM

## 2019-02-19 DIAGNOSIS — I20.0 UNSTABLE ANGINA: ICD-10-CM

## 2019-02-19 LAB
ALBUMIN SERPL ELPH-MCNC: 4.6 G/DL — SIGNIFICANT CHANGE UP (ref 3.3–5)
ALP SERPL-CCNC: 66 U/L — SIGNIFICANT CHANGE UP (ref 40–120)
ALT FLD-CCNC: 43 U/L — SIGNIFICANT CHANGE UP (ref 10–45)
ANION GAP SERPL CALC-SCNC: 16 MMOL/L — SIGNIFICANT CHANGE UP (ref 5–17)
APTT BLD: 28.9 SEC — SIGNIFICANT CHANGE UP (ref 27.5–36.3)
AST SERPL-CCNC: 29 U/L — SIGNIFICANT CHANGE UP (ref 10–40)
BASOPHILS # BLD AUTO: 0 K/UL — SIGNIFICANT CHANGE UP (ref 0–0.2)
BASOPHILS NFR BLD AUTO: 0 % — SIGNIFICANT CHANGE UP (ref 0–2)
BILIRUB SERPL-MCNC: 0.2 MG/DL — SIGNIFICANT CHANGE UP (ref 0.2–1.2)
BUN SERPL-MCNC: 21 MG/DL — SIGNIFICANT CHANGE UP (ref 7–23)
CALCIUM SERPL-MCNC: 9.6 MG/DL — SIGNIFICANT CHANGE UP (ref 8.4–10.5)
CHLORIDE SERPL-SCNC: 102 MMOL/L — SIGNIFICANT CHANGE UP (ref 96–108)
CO2 SERPL-SCNC: 22 MMOL/L — SIGNIFICANT CHANGE UP (ref 22–31)
CREAT SERPL-MCNC: 1.13 MG/DL — SIGNIFICANT CHANGE UP (ref 0.5–1.3)
EOSINOPHIL # BLD AUTO: 0 K/UL — SIGNIFICANT CHANGE UP (ref 0–0.5)
EOSINOPHIL NFR BLD AUTO: 0.1 % — SIGNIFICANT CHANGE UP (ref 0–6)
GLUCOSE BLDC GLUCOMTR-MCNC: 168 MG/DL — HIGH (ref 70–99)
GLUCOSE SERPL-MCNC: 167 MG/DL — HIGH (ref 70–99)
HCT VFR BLD CALC: 38.9 % — LOW (ref 39–50)
HGB BLD-MCNC: 12.7 G/DL — LOW (ref 13–17)
INR BLD: 0.94 RATIO — SIGNIFICANT CHANGE UP (ref 0.88–1.16)
LYMPHOCYTES # BLD AUTO: 18.2 % — SIGNIFICANT CHANGE UP (ref 13–44)
LYMPHOCYTES # BLD AUTO: 2.2 K/UL — SIGNIFICANT CHANGE UP (ref 1–3.3)
MCHC RBC-ENTMCNC: 27.3 PG — SIGNIFICANT CHANGE UP (ref 27–34)
MCHC RBC-ENTMCNC: 32.7 GM/DL — SIGNIFICANT CHANGE UP (ref 32–36)
MCV RBC AUTO: 83.4 FL — SIGNIFICANT CHANGE UP (ref 80–100)
MONOCYTES # BLD AUTO: 0.8 K/UL — SIGNIFICANT CHANGE UP (ref 0–0.9)
MONOCYTES NFR BLD AUTO: 6.9 % — SIGNIFICANT CHANGE UP (ref 2–14)
NEUTROPHILS # BLD AUTO: 9 K/UL — HIGH (ref 1.8–7.4)
NEUTROPHILS NFR BLD AUTO: 74.7 % — SIGNIFICANT CHANGE UP (ref 43–77)
NT-PROBNP SERPL-SCNC: 42 PG/ML — SIGNIFICANT CHANGE UP (ref 0–300)
PLATELET # BLD AUTO: 305 K/UL — SIGNIFICANT CHANGE UP (ref 150–400)
POTASSIUM SERPL-MCNC: 3.8 MMOL/L — SIGNIFICANT CHANGE UP (ref 3.5–5.3)
POTASSIUM SERPL-SCNC: 3.8 MMOL/L — SIGNIFICANT CHANGE UP (ref 3.5–5.3)
PROT SERPL-MCNC: 7.6 G/DL — SIGNIFICANT CHANGE UP (ref 6–8.3)
PROTHROM AB SERPL-ACNC: 10.7 SEC — SIGNIFICANT CHANGE UP (ref 10–12.9)
RBC # BLD: 4.66 M/UL — SIGNIFICANT CHANGE UP (ref 4.2–5.8)
RBC # FLD: 13.3 % — SIGNIFICANT CHANGE UP (ref 10.3–14.5)
SODIUM SERPL-SCNC: 140 MMOL/L — SIGNIFICANT CHANGE UP (ref 135–145)
TROPONIN T, HIGH SENSITIVITY RESULT: 10 NG/L — SIGNIFICANT CHANGE UP (ref 0–51)
WBC # BLD: 12.1 K/UL — HIGH (ref 3.8–10.5)
WBC # FLD AUTO: 12.1 K/UL — HIGH (ref 3.8–10.5)

## 2019-02-19 PROCEDURE — 71046 X-RAY EXAM CHEST 2 VIEWS: CPT | Mod: 26

## 2019-02-19 PROCEDURE — 92928 PRQ TCAT PLMT NTRAC ST 1 LES: CPT | Mod: LD,GC

## 2019-02-19 PROCEDURE — 93010 ELECTROCARDIOGRAM REPORT: CPT

## 2019-02-19 PROCEDURE — 99285 EMERGENCY DEPT VISIT HI MDM: CPT | Mod: GC,25

## 2019-02-19 PROCEDURE — 93010 ELECTROCARDIOGRAM REPORT: CPT | Mod: GC

## 2019-02-19 PROCEDURE — 99152 MOD SED SAME PHYS/QHP 5/>YRS: CPT | Mod: GC

## 2019-02-19 PROCEDURE — 76937 US GUIDE VASCULAR ACCESS: CPT | Mod: 26,GC

## 2019-02-19 PROCEDURE — 93454 CORONARY ARTERY ANGIO S&I: CPT | Mod: 26,59,GC

## 2019-02-19 RX ORDER — HYDRALAZINE HCL 50 MG
25 TABLET ORAL
Qty: 0 | Refills: 0 | Status: DISCONTINUED | OUTPATIENT
Start: 2019-02-19 | End: 2019-02-21

## 2019-02-19 RX ORDER — CHOLECALCIFEROL (VITAMIN D3) 125 MCG
2000 CAPSULE ORAL DAILY
Qty: 0 | Refills: 0 | Status: DISCONTINUED | OUTPATIENT
Start: 2019-02-19 | End: 2019-02-21

## 2019-02-19 RX ORDER — LOSARTAN POTASSIUM 100 MG/1
100 TABLET, FILM COATED ORAL DAILY
Qty: 0 | Refills: 0 | Status: DISCONTINUED | OUTPATIENT
Start: 2019-02-19 | End: 2019-02-21

## 2019-02-19 RX ORDER — CLOPIDOGREL BISULFATE 75 MG/1
75 TABLET, FILM COATED ORAL DAILY
Qty: 0 | Refills: 0 | Status: DISCONTINUED | OUTPATIENT
Start: 2019-02-19 | End: 2019-02-21

## 2019-02-19 RX ORDER — METOPROLOL TARTRATE 50 MG
25 TABLET ORAL AT BEDTIME
Qty: 0 | Refills: 0 | Status: DISCONTINUED | OUTPATIENT
Start: 2019-02-19 | End: 2019-02-21

## 2019-02-19 RX ORDER — ASPIRIN/CALCIUM CARB/MAGNESIUM 324 MG
81 TABLET ORAL DAILY
Qty: 0 | Refills: 0 | Status: DISCONTINUED | OUTPATIENT
Start: 2019-02-19 | End: 2019-02-21

## 2019-02-19 RX ORDER — METOPROLOL TARTRATE 50 MG
50 TABLET ORAL DAILY
Qty: 0 | Refills: 0 | Status: DISCONTINUED | OUTPATIENT
Start: 2019-02-19 | End: 2019-02-21

## 2019-02-19 RX ORDER — TAMSULOSIN HYDROCHLORIDE 0.4 MG/1
0.4 CAPSULE ORAL AT BEDTIME
Qty: 0 | Refills: 0 | Status: DISCONTINUED | OUTPATIENT
Start: 2019-02-19 | End: 2019-02-20

## 2019-02-19 RX ORDER — MULTIVIT-MIN/FERROUS GLUCONATE 9 MG/15 ML
1 LIQUID (ML) ORAL DAILY
Qty: 0 | Refills: 0 | Status: DISCONTINUED | OUTPATIENT
Start: 2019-02-19 | End: 2019-02-21

## 2019-02-19 RX ORDER — MORPHINE SULFATE 50 MG/1
4 CAPSULE, EXTENDED RELEASE ORAL ONCE
Qty: 0 | Refills: 0 | Status: DISCONTINUED | OUTPATIENT
Start: 2019-02-19 | End: 2019-02-19

## 2019-02-19 RX ORDER — PANTOPRAZOLE SODIUM 20 MG/1
40 TABLET, DELAYED RELEASE ORAL
Qty: 0 | Refills: 0 | Status: DISCONTINUED | OUTPATIENT
Start: 2019-02-19 | End: 2019-02-21

## 2019-02-19 RX ORDER — ALBUTEROL 90 UG/1
2 AEROSOL, METERED ORAL EVERY 6 HOURS
Qty: 0 | Refills: 0 | Status: DISCONTINUED | OUTPATIENT
Start: 2019-02-19 | End: 2019-02-21

## 2019-02-19 RX ORDER — ALPRAZOLAM 0.25 MG
0.5 TABLET ORAL
Qty: 0 | Refills: 0 | Status: DISCONTINUED | OUTPATIENT
Start: 2019-02-19 | End: 2019-02-21

## 2019-02-19 RX ORDER — ATORVASTATIN CALCIUM 80 MG/1
40 TABLET, FILM COATED ORAL AT BEDTIME
Qty: 0 | Refills: 0 | Status: DISCONTINUED | OUTPATIENT
Start: 2019-02-19 | End: 2019-02-21

## 2019-02-19 RX ORDER — ASPIRIN/CALCIUM CARB/MAGNESIUM 324 MG
81 TABLET ORAL ONCE
Qty: 0 | Refills: 0 | Status: COMPLETED | OUTPATIENT
Start: 2019-02-19 | End: 2019-02-19

## 2019-02-19 RX ORDER — HYDRALAZINE HCL 50 MG
50 TABLET ORAL
Qty: 0 | Refills: 0 | Status: DISCONTINUED | OUTPATIENT
Start: 2019-02-19 | End: 2019-02-21

## 2019-02-19 RX ADMIN — Medication 25 MILLIGRAM(S): at 21:50

## 2019-02-19 RX ADMIN — MORPHINE SULFATE 4 MILLIGRAM(S): 50 CAPSULE, EXTENDED RELEASE ORAL at 10:43

## 2019-02-19 RX ADMIN — TAMSULOSIN HYDROCHLORIDE 0.4 MILLIGRAM(S): 0.4 CAPSULE ORAL at 21:50

## 2019-02-19 RX ADMIN — MORPHINE SULFATE 4 MILLIGRAM(S): 50 CAPSULE, EXTENDED RELEASE ORAL at 10:39

## 2019-02-19 RX ADMIN — Medication 81 MILLIGRAM(S): at 09:50

## 2019-02-19 RX ADMIN — ATORVASTATIN CALCIUM 40 MILLIGRAM(S): 80 TABLET, FILM COATED ORAL at 21:50

## 2019-02-19 NOTE — ED ADULT NURSE NOTE - OBJECTIVE STATEMENT
62 y/o male sent in by cardiologist for cath. Had stress test on 2/15 and was told by his cardiologist to come to ER for cath.  Patient reports he has had intermittent non radiating left chest pain x 2-3 weeks that lasts up to several hours at a time with mild SOB.  Also with mild chest pain.  Denies abd pain, n/v/d, cough, fever.

## 2019-02-19 NOTE — CHART NOTE - NSCHARTNOTEFT_GEN_A_CORE
Removal of Femoral Sheath    Pulses in the right lower extremity are palpable.  The patient was placed in the supine position. The insertion site was identified and the sutures were removed per protocol.    The __6__ [place] femoral sheath was then removed by ARLETH Benites  Direct pressure was applied for  __20___ minutes.   Complications: None, VSS, Good Hemostasis.     Monitoring of the right/left groin and both lower extremities including neuro-vascular checks and vital signs every 15 minutes x 4, then every 30 minutes x 2, then every 1 hour was ordered.    Discharge Instruction discussed with patient: ASA, Plavix/Brilinta, statin, diet, activities, access site care, follow up care, reportable signs and symptoms.     A/P    63 year old male Hx of HTN, HLD, diet controlled DMT2, CAD with multiple stents s/p Stent in LAD and staged PCI in AM in RCA.     Plan: staged PCI in RCA in am,   continue to monitor, Continue ASA, Plavix, Statin,   Monitor CBC, BMP

## 2019-02-19 NOTE — ED CLERICAL - NS ED CLERK NOTE PRE-ARRIVAL INFORMATION; ADDITIONAL PRE-ARRIVAL INFORMATION
CC/Reason For referral: chest pain with SOB and abnormal nuclear stress test. To have cath today with   Dr. Leyva  Preferred Consultant(if applicable): [facility]  Who admits for you (if needed): [facility]  Do you have documents you would like to fax over? no/ already spoke with Dr. Leyva  Would you still like to speak to an ED attending? yes

## 2019-02-19 NOTE — H&P CARDIOLOGY - FAMILY HISTORY
Father  Still living? Unknown  Family history of prostate cancer in father, Age at diagnosis: Age Unknown     Mother  Still living? Unknown  Family history of coronary artery disease in mother, Age at diagnosis: Age Unknown Family history of prostate cancer in father     Mother  Still living? Unknown  Family history of coronary artery disease in mother, Age at diagnosis: Age Unknown     Grandparent  Still living? Unknown  Family history of coronary artery disease in mother, Age at diagnosis: Age Unknown     Aunt  Still living? Yes, Estimated age: Age Unknown  Family history of coronary artery disease in mother, Age at diagnosis: Age Unknown

## 2019-02-19 NOTE — H&P CARDIOLOGY - HISTORY OF PRESENT ILLNESS
64 yo male pt with PMHx of HTN, HLD, CAD (2 MIs, s/p 7 stents - last 2010; on asa only), emphysema 2/2 Lung CA s/p RUL lobectomy, and Prostate CA - sent by cardiologist for cath. Patient has had L chest pain x 2-3 wks intermittently (non-radiating) lasting up to several hours at a time no change w/ eating or exertion, assc w/ mild sob. Denies abd pain, n/v/d/c, cough, fever. Had stress test 2/15 that was abnormal. Currently has mild chest pain. 64 yo male pt with PMHx of HTN, HLD, CAD (2 MIs, s/p 7 stents - last 2010; on asa only), emphysema 2/2 Lung CA s/p RUL lobectomy(2013 Dr. Osborne), and Prostate Ca (no chemo or RTX needed), TAYA not on CPAP, family Hx of heart disease and former smoker sent by cardiologist Dr. Mcnamara to ER today for cath. Pt reports he has been feeling midsternal chest pain for past 2-3 weeks intermittently radiated to bilateral arms left>right lasting up to several hours at a time no change w/ eating or exertion, associated with SOB. pt denies abd pain, n/v/d/c, cough, fever. Stress test on 1/2017 showed normal (below), now came up for cardiac cath. pt still has 4/10 chest pain after Morphine in ER which is better from 8-9/10.   < from: Nuclear Stress Test-Pharmacologic (01.29.17 @ 12:30) >   * The left ventricle was normal in size. * Tracer uptake was homogeneous throughout the left ventricle.  * Normal study; no evidence for myocardial infarction or ischemia.  * Gated wall motion analysis was performed, and shows normal wall motion.  Post-stress gated wall motion analysis was performed (LVEF = 60 %) < end of copied text >
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
## 2019-02-19 NOTE — ED PROVIDER NOTE - PHYSICAL EXAMINATION
*GEN:   in no acute distress, AOx3  *EYES:   pupils equally round and reactive to light, extra-occular movements intact  *HEENT:   airway patent, moist mucosal membranes, full ROM neck  *CV:   mildly tender to palpation over sternum, regular rate and rhythm  *RESP:   clear to auscultation bilaterally, non-labored  *ABD:   soft, non-tender  *:   no cva/flank tenderness  *MSK:   no MSK tenderness or limited ROM  *SKIN:   dry, intact  *NEURO:   AOx3, no focal weakness or loss of sensation, gait normal

## 2019-02-19 NOTE — ED PROVIDER NOTE - ATTENDING CONTRIBUTION TO CARE
I have seen and evaluated this patient with the resident.   I agree with the findings  unless other wise stated.  I have made appropriate changes in documentations where needed, After my face to face bedside evaluation, I am further  notinM w/  recurrent worse with minimal exertion about 15 steps causing sob chest pain and diaphoresis chest pain no syncope had an abormal stress test 2 days ago Alert no distress no cyanosis lungs clear no edema no jVD abdomen soft Heart regular s1s2 very concerning history will admit for definitive care and coronary cath - significant h/o CAD and prior stents - sent for cath - will check labs, cxr, give additional asa 81mg, reassessed -- Perez

## 2019-02-19 NOTE — ED ADULT NURSE NOTE - PAIN RATING/NUMBER SCALE (0-10): ACTIVITY
Spoke with patient in room. Discussed Transition of care/case progression and discharge planning. States lives with wife at home and anticipates no needs on d/c. Had outpatient ATbx po and prednisone along with inhalers. Was not getting better, so on re-eval by PCP, was sent for admission . All questions answered. Will continue to follow. 4

## 2019-02-19 NOTE — ED PROVIDER NOTE - OBJECTIVE STATEMENT
63M w/ pmh HTN, HLD, CAD (2 MIs, s/p 7 stents - last 2010), Lung CA s/p RUL lobectomy, HLD, and Prostate CA - sent by cardiologist for 63M w/ pmh HTN, HLD, CAD (2 MIs, s/p 7 stents - last 2010; on asa only), emphysema 2/2 Lung CA s/p RUL lobectomy, and Prostate CA - sent by cardiologist for cath. Patient has had L chest pain x 2-3 wks intermittently (non-radiating) lasting up to several hours at a time no change w/ eating or exertion, assc w/ mild sob. Denies abd pain, n/v/d/c, cough, fever. Had stress test 2/15 that was abnormal. Currently has mild chest pain.    PCP/Cards: Bob Mcnamara

## 2019-02-19 NOTE — ED PROVIDER NOTE - CLINICAL SUMMARY MEDICAL DECISION MAKING FREE TEXT BOX
63M w/ chest pain - significant h/o CAD and prior stents - sent for cath - will check labs, cxr, give additional asa 81mg, reassess

## 2019-02-20 ENCOUNTER — TRANSCRIPTION ENCOUNTER (OUTPATIENT)
Age: 64
End: 2019-02-20

## 2019-02-20 LAB
ANION GAP SERPL CALC-SCNC: 15 MMOL/L — SIGNIFICANT CHANGE UP (ref 5–17)
BUN SERPL-MCNC: 25 MG/DL — HIGH (ref 7–23)
CALCIUM SERPL-MCNC: 9.6 MG/DL — SIGNIFICANT CHANGE UP (ref 8.4–10.5)
CHLORIDE SERPL-SCNC: 102 MMOL/L — SIGNIFICANT CHANGE UP (ref 96–108)
CO2 SERPL-SCNC: 22 MMOL/L — SIGNIFICANT CHANGE UP (ref 22–31)
CREAT SERPL-MCNC: 1.17 MG/DL — SIGNIFICANT CHANGE UP (ref 0.5–1.3)
GLUCOSE BLDC GLUCOMTR-MCNC: 98 MG/DL — SIGNIFICANT CHANGE UP (ref 70–99)
GLUCOSE SERPL-MCNC: 124 MG/DL — HIGH (ref 70–99)
HBA1C BLD-MCNC: 7.1 % — HIGH (ref 4–5.6)
HBA1C BLD-MCNC: 7.3 % — HIGH (ref 4–5.6)
HCT VFR BLD CALC: 36.7 % — LOW (ref 39–50)
HCT VFR BLD CALC: 40.2 % — SIGNIFICANT CHANGE UP (ref 39–50)
HGB BLD-MCNC: 12.2 G/DL — LOW (ref 13–17)
HGB BLD-MCNC: 13.2 G/DL — SIGNIFICANT CHANGE UP (ref 13–17)
MCHC RBC-ENTMCNC: 27.3 PG — SIGNIFICANT CHANGE UP (ref 27–34)
MCHC RBC-ENTMCNC: 27.6 PG — SIGNIFICANT CHANGE UP (ref 27–34)
MCHC RBC-ENTMCNC: 32.9 GM/DL — SIGNIFICANT CHANGE UP (ref 32–36)
MCHC RBC-ENTMCNC: 33.2 GM/DL — SIGNIFICANT CHANGE UP (ref 32–36)
MCV RBC AUTO: 83 FL — SIGNIFICANT CHANGE UP (ref 80–100)
MCV RBC AUTO: 83.2 FL — SIGNIFICANT CHANGE UP (ref 80–100)
PLATELET # BLD AUTO: 259 K/UL — SIGNIFICANT CHANGE UP (ref 150–400)
PLATELET # BLD AUTO: 308 K/UL — SIGNIFICANT CHANGE UP (ref 150–400)
POTASSIUM SERPL-MCNC: 3.8 MMOL/L — SIGNIFICANT CHANGE UP (ref 3.5–5.3)
POTASSIUM SERPL-SCNC: 3.8 MMOL/L — SIGNIFICANT CHANGE UP (ref 3.5–5.3)
RBC # BLD: 4.42 M/UL — SIGNIFICANT CHANGE UP (ref 4.2–5.8)
RBC # BLD: 4.83 M/UL — SIGNIFICANT CHANGE UP (ref 4.2–5.8)
RBC # FLD: 12.8 % — SIGNIFICANT CHANGE UP (ref 10.3–14.5)
RBC # FLD: 12.8 % — SIGNIFICANT CHANGE UP (ref 10.3–14.5)
SODIUM SERPL-SCNC: 139 MMOL/L — SIGNIFICANT CHANGE UP (ref 135–145)
WBC # BLD: 10.6 K/UL — HIGH (ref 3.8–10.5)
WBC # BLD: 15.3 K/UL — HIGH (ref 3.8–10.5)
WBC # FLD AUTO: 10.6 K/UL — HIGH (ref 3.8–10.5)
WBC # FLD AUTO: 15.3 K/UL — HIGH (ref 3.8–10.5)

## 2019-02-20 PROCEDURE — 76937 US GUIDE VASCULAR ACCESS: CPT | Mod: 26,GC

## 2019-02-20 PROCEDURE — 99152 MOD SED SAME PHYS/QHP 5/>YRS: CPT | Mod: GC

## 2019-02-20 PROCEDURE — 93010 ELECTROCARDIOGRAM REPORT: CPT

## 2019-02-20 PROCEDURE — 93010 ELECTROCARDIOGRAM REPORT: CPT | Mod: 77

## 2019-02-20 PROCEDURE — 92928 PRQ TCAT PLMT NTRAC ST 1 LES: CPT | Mod: RC,GC

## 2019-02-20 RX ORDER — TAMSULOSIN HYDROCHLORIDE 0.4 MG/1
0.4 CAPSULE ORAL DAILY
Qty: 0 | Refills: 0 | Status: DISCONTINUED | OUTPATIENT
Start: 2019-02-20 | End: 2019-02-20

## 2019-02-20 RX ORDER — TAMSULOSIN HYDROCHLORIDE 0.4 MG/1
0.4 CAPSULE ORAL
Qty: 0 | Refills: 0 | Status: DISCONTINUED | OUTPATIENT
Start: 2019-02-20 | End: 2019-02-21

## 2019-02-20 RX ADMIN — TAMSULOSIN HYDROCHLORIDE 0.4 MILLIGRAM(S): 0.4 CAPSULE ORAL at 07:30

## 2019-02-20 RX ADMIN — ATORVASTATIN CALCIUM 40 MILLIGRAM(S): 80 TABLET, FILM COATED ORAL at 21:56

## 2019-02-20 RX ADMIN — Medication 25 MILLIGRAM(S): at 21:55

## 2019-02-20 RX ADMIN — LOSARTAN POTASSIUM 100 MILLIGRAM(S): 100 TABLET, FILM COATED ORAL at 05:30

## 2019-02-20 RX ADMIN — Medication 1 TABLET(S): at 21:55

## 2019-02-20 RX ADMIN — PANTOPRAZOLE SODIUM 40 MILLIGRAM(S): 20 TABLET, DELAYED RELEASE ORAL at 05:30

## 2019-02-20 RX ADMIN — Medication 50 MILLIGRAM(S): at 05:30

## 2019-02-20 RX ADMIN — CLOPIDOGREL BISULFATE 75 MILLIGRAM(S): 75 TABLET, FILM COATED ORAL at 05:30

## 2019-02-20 RX ADMIN — Medication 2000 UNIT(S): at 21:56

## 2019-02-20 RX ADMIN — Medication 30 MILLILITER(S): at 06:10

## 2019-02-20 RX ADMIN — Medication 50 MILLIGRAM(S): at 07:30

## 2019-02-20 RX ADMIN — Medication 81 MILLIGRAM(S): at 05:30

## 2019-02-20 RX ADMIN — TAMSULOSIN HYDROCHLORIDE 0.4 MILLIGRAM(S): 0.4 CAPSULE ORAL at 21:56

## 2019-02-20 NOTE — CHART NOTE - NSCHARTNOTEFT_GEN_A_CORE
FEMORAL ANGIOSEAL ASSESSMENT NOTE    Right groin angioseal in place with oozing, quick clot applied, no hematoma  Pulses in the RIGHT lower extremity are palpable, ( right femoral/DP/PT pulses +2).  Right groin is soft/non tender,  Patient denies leg or foot pain, numbness, tingling,  or chest pain  post 12 lead EKG WNL, no ST or T wave changes noted when compared to pre procedural EKG    Complications: None    Comments: Bedrest for the next 2 hours as per protocol     Education: medication adherence, groin care, signs and symptoms of groin complications reviewed with pt who verbalizes understanding. All questions answered.     Kulwinder Blankenship, NP  x [phone] FEMORAL ANGIOSEAL ASSESSMENT NOTE    Right groin perclose in place with oozing, quick clot applied, no hematoma  Pulses in the RIGHT lower extremity are palpable, ( right femoral/DP/PT pulses +2).  Right groin is soft/non tender,  Patient denies leg or foot pain, numbness, tingling,  or chest pain  post 12 lead EKG WNL, no ST or T wave changes noted when compared to pre procedural EKG    Complications: None    Comments: Bedrest for the next 2 hours as per protocol     Education: medication adherence, groin care, signs and symptoms of groin complications reviewed with pt who verbalizes understanding. All questions answered.     Kulwinder Blankenship, NP  x [phone] FEMORAL PERCLOSE ASSESSMENT NOTE    Right groin perclose in place with oozing, quick clot applied, no hematoma  Pulses in the RIGHT lower extremity are palpable, ( right femoral/DP/PT pulses +2).  Right groin is soft/non tender,  Patient denies leg or foot pain, numbness, tingling,  or chest pain  post 12 lead EKG WNL, no ST or T wave changes noted when compared to pre procedural EKG    Complications: None    Comments: Bedrest for the next 2 hours as per protocol     Education: medication adherence, groin care, signs and symptoms of groin complications reviewed with pt who verbalizes understanding. All questions answered.     Kulwinder Blankenship, NP  x [phone]

## 2019-02-20 NOTE — PROGRESS NOTE ADULT - ASSESSMENT
63y old  Male who presents with a chief complaint of chest pain now s/p LHC ANTOLIN x 1 LAD via right femoral artery access. Pt tolerated the procedure well, cath site benign. Overnight remained uneventful. Post-procedure discharge instructions discussed and questions addressed. 63y old  Male who presents with a chief complaint of chest pain now s/p LHC ANTOLIN x 1 LAD (90%) and RCA (90%)  via right femoral artery access. Pt tolerated the procedure well, cath site benign. Overnight remained uneventful. Staged

## 2019-02-20 NOTE — PROGRESS NOTE ADULT - SUBJECTIVE AND OBJECTIVE BOX
63y old  Male who presents with a chief complaint of chest pain now s/p LHC ANTOLIN x 1 LAD via right femoral artery access.         Allergies    adhesives (Blisters)  Percocet 5/325 (Pruritus)  Vicodin (Hives)    Intolerances        Medications:  ALBUTerol    90 MICROgram(s) HFA Inhaler 2 Puff(s) Inhalation every 6 hours PRN  ALPRAZolam 0.5 milliGRAM(s) Oral two times a day PRN  aspirin enteric coated 81 milliGRAM(s) Oral daily  atorvastatin 40 milliGRAM(s) Oral at bedtime  cholecalciferol 2000 Unit(s) Oral daily  clopidogrel Tablet 75 milliGRAM(s) Oral daily  hydrALAZINE 25 milliGRAM(s) Oral <User Schedule>  hydrALAZINE 50 milliGRAM(s) Oral <User Schedule>  losartan 100 milliGRAM(s) Oral daily  metoprolol tartrate 25 milliGRAM(s) Oral at bedtime  metoprolol tartrate 50 milliGRAM(s) Oral daily  multivitamin/minerals 1 Tablet(s) Oral daily  pantoprazole    Tablet 40 milliGRAM(s) Oral before breakfast  tamsulosin 0.4 milliGRAM(s) Oral at bedtime      Vitals:  T(C): 36.6 (19 @ 21:50), Max: 37.2 (19 @ 09:35)  HR: 70 (19 @ 21:50) (52 - 76)  BP: 166/70 (19 @ 21:50) (121/62 - 166/70)  BP(mean): 106 (19 @ 10:50) (106 - 106)  RR: 18 (19 @ 21:50) (18 - 18)  SpO2: 100% (19 @ 21:50) (97% - 100%)  Wt(kg): --  Daily Height in cm: 167.64 (2019 10:50)    Daily Weight in k.5 (2019 10:50)  I&O's Summary    2019 07:01  -  2019 03:56  --------------------------------------------------------  IN: 480 mL / OUT: 250 mL / NET: 230 mL          Physical Exam:  Appearance: Normal  Procedural Access Site: Right femoral artery access. No hematoma, Non-tender to palpation, 2+ pulse, No bruit, No Ecchymosis  Psychiatry: AAOx3, Mood & affect appropriate  Skin: No rashes, No ecchymoses, No cyanosis        139  |  102  |  25<H>  ----------------------------<  124<H>  3.8   |  22  |  1.17    Ca    9.6      2019 00:09    TPro  7.6  /  Alb  4.6  /  TBili  0.2  /  DBili  x   /  AST  29  /  ALT  43  /  AlkPhos  66      PT/INR - ( 2019 09:57 )   PT: 10.7 sec;   INR: 0.94 ratio         PTT - ( 2019 09:57 )  PTT:28.9 sec      Serum Pro-Brain Natriuretic Peptide: 42 pg/mL ( @ 09:57)

## 2019-02-21 VITALS
DIASTOLIC BLOOD PRESSURE: 89 MMHG | SYSTOLIC BLOOD PRESSURE: 144 MMHG | RESPIRATION RATE: 18 BRPM | HEART RATE: 100 BPM | OXYGEN SATURATION: 97 %

## 2019-02-21 DIAGNOSIS — E78.5 HYPERLIPIDEMIA, UNSPECIFIED: ICD-10-CM

## 2019-02-21 DIAGNOSIS — I25.118 ATHEROSCLEROTIC HEART DISEASE OF NATIVE CORONARY ARTERY WITH OTHER FORMS OF ANGINA PECTORIS: ICD-10-CM

## 2019-02-21 DIAGNOSIS — I10 ESSENTIAL (PRIMARY) HYPERTENSION: ICD-10-CM

## 2019-02-21 LAB
ANION GAP SERPL CALC-SCNC: 14 MMOL/L — SIGNIFICANT CHANGE UP (ref 5–17)
BASOPHILS # BLD AUTO: 0 K/UL — SIGNIFICANT CHANGE UP (ref 0–0.2)
BASOPHILS NFR BLD AUTO: 0.2 % — SIGNIFICANT CHANGE UP (ref 0–2)
BUN SERPL-MCNC: 21 MG/DL — SIGNIFICANT CHANGE UP (ref 7–23)
CALCIUM SERPL-MCNC: 9.4 MG/DL — SIGNIFICANT CHANGE UP (ref 8.4–10.5)
CHLORIDE SERPL-SCNC: 100 MMOL/L — SIGNIFICANT CHANGE UP (ref 96–108)
CO2 SERPL-SCNC: 23 MMOL/L — SIGNIFICANT CHANGE UP (ref 22–31)
CREAT SERPL-MCNC: 1.09 MG/DL — SIGNIFICANT CHANGE UP (ref 0.5–1.3)
EOSINOPHIL # BLD AUTO: 0.1 K/UL — SIGNIFICANT CHANGE UP (ref 0–0.5)
EOSINOPHIL NFR BLD AUTO: 0.8 % — SIGNIFICANT CHANGE UP (ref 0–6)
GLUCOSE SERPL-MCNC: 110 MG/DL — HIGH (ref 70–99)
HCT VFR BLD CALC: 39 % — SIGNIFICANT CHANGE UP (ref 39–50)
HGB BLD-MCNC: 12.8 G/DL — LOW (ref 13–17)
LYMPHOCYTES # BLD AUTO: 3.8 K/UL — HIGH (ref 1–3.3)
LYMPHOCYTES # BLD AUTO: 30.9 % — SIGNIFICANT CHANGE UP (ref 13–44)
MCHC RBC-ENTMCNC: 27.2 PG — SIGNIFICANT CHANGE UP (ref 27–34)
MCHC RBC-ENTMCNC: 32.8 GM/DL — SIGNIFICANT CHANGE UP (ref 32–36)
MCV RBC AUTO: 82.8 FL — SIGNIFICANT CHANGE UP (ref 80–100)
MONOCYTES # BLD AUTO: 1.3 K/UL — HIGH (ref 0–0.9)
MONOCYTES NFR BLD AUTO: 11.1 % — SIGNIFICANT CHANGE UP (ref 2–14)
NEUTROPHILS # BLD AUTO: 6.9 K/UL — SIGNIFICANT CHANGE UP (ref 1.8–7.4)
NEUTROPHILS NFR BLD AUTO: 57 % — SIGNIFICANT CHANGE UP (ref 43–77)
PLATELET # BLD AUTO: 283 K/UL — SIGNIFICANT CHANGE UP (ref 150–400)
POTASSIUM SERPL-MCNC: 3.9 MMOL/L — SIGNIFICANT CHANGE UP (ref 3.5–5.3)
POTASSIUM SERPL-SCNC: 3.9 MMOL/L — SIGNIFICANT CHANGE UP (ref 3.5–5.3)
RBC # BLD: 4.71 M/UL — SIGNIFICANT CHANGE UP (ref 4.2–5.8)
RBC # FLD: 12.7 % — SIGNIFICANT CHANGE UP (ref 10.3–14.5)
SODIUM SERPL-SCNC: 137 MMOL/L — SIGNIFICANT CHANGE UP (ref 135–145)
WBC # BLD: 12.1 K/UL — HIGH (ref 3.8–10.5)
WBC # FLD AUTO: 12.1 K/UL — HIGH (ref 3.8–10.5)

## 2019-02-21 PROCEDURE — 83036 HEMOGLOBIN GLYCOSYLATED A1C: CPT

## 2019-02-21 PROCEDURE — 99231 SBSQ HOSP IP/OBS SF/LOW 25: CPT

## 2019-02-21 PROCEDURE — 82962 GLUCOSE BLOOD TEST: CPT

## 2019-02-21 PROCEDURE — 76882 US LMTD JT/FCL EVL NVASC XTR: CPT

## 2019-02-21 PROCEDURE — 93454 CORONARY ARTERY ANGIO S&I: CPT | Mod: 59

## 2019-02-21 PROCEDURE — 85610 PROTHROMBIN TIME: CPT

## 2019-02-21 PROCEDURE — 93005 ELECTROCARDIOGRAM TRACING: CPT

## 2019-02-21 PROCEDURE — C1769: CPT

## 2019-02-21 PROCEDURE — 99152 MOD SED SAME PHYS/QHP 5/>YRS: CPT

## 2019-02-21 PROCEDURE — 85730 THROMBOPLASTIN TIME PARTIAL: CPT

## 2019-02-21 PROCEDURE — C1887: CPT

## 2019-02-21 PROCEDURE — 80048 BASIC METABOLIC PNL TOTAL CA: CPT

## 2019-02-21 PROCEDURE — C1725: CPT

## 2019-02-21 PROCEDURE — 76937 US GUIDE VASCULAR ACCESS: CPT

## 2019-02-21 PROCEDURE — 80053 COMPREHEN METABOLIC PANEL: CPT

## 2019-02-21 PROCEDURE — 83880 ASSAY OF NATRIURETIC PEPTIDE: CPT

## 2019-02-21 PROCEDURE — 76882 US LMTD JT/FCL EVL NVASC XTR: CPT | Mod: 26,RT

## 2019-02-21 PROCEDURE — 85027 COMPLETE CBC AUTOMATED: CPT

## 2019-02-21 PROCEDURE — C1874: CPT

## 2019-02-21 PROCEDURE — 99285 EMERGENCY DEPT VISIT HI MDM: CPT

## 2019-02-21 PROCEDURE — C1760: CPT

## 2019-02-21 PROCEDURE — 71046 X-RAY EXAM CHEST 2 VIEWS: CPT

## 2019-02-21 PROCEDURE — C1894: CPT

## 2019-02-21 PROCEDURE — 84484 ASSAY OF TROPONIN QUANT: CPT

## 2019-02-21 PROCEDURE — C9600: CPT | Mod: RC

## 2019-02-21 RX ADMIN — PANTOPRAZOLE SODIUM 40 MILLIGRAM(S): 20 TABLET, DELAYED RELEASE ORAL at 05:14

## 2019-02-21 RX ADMIN — Medication 2000 UNIT(S): at 12:04

## 2019-02-21 RX ADMIN — Medication 50 MILLIGRAM(S): at 12:04

## 2019-02-21 RX ADMIN — Medication 1 TABLET(S): at 12:04

## 2019-02-21 RX ADMIN — CLOPIDOGREL BISULFATE 75 MILLIGRAM(S): 75 TABLET, FILM COATED ORAL at 05:14

## 2019-02-21 RX ADMIN — TAMSULOSIN HYDROCHLORIDE 0.4 MILLIGRAM(S): 0.4 CAPSULE ORAL at 05:14

## 2019-02-21 RX ADMIN — Medication 50 MILLIGRAM(S): at 05:14

## 2019-02-21 RX ADMIN — Medication 81 MILLIGRAM(S): at 05:14

## 2019-02-21 RX ADMIN — LOSARTAN POTASSIUM 100 MILLIGRAM(S): 100 TABLET, FILM COATED ORAL at 05:14

## 2019-02-21 NOTE — DISCHARGE NOTE ADULT - PLAN OF CARE
Patient remains chest pain free and understands post cath discharge instructions. Do not stop your aspirin or Plavix unless instructed to do so by your cardiologist, they help keep your stented arteries open.   No heavy lifting, strenuous activity, bending, straining, or unnecessary stair climbing for 2 weeks. No driving for 2 days. You may shower 24 hours following the procedure but avoid baths/swimming for 1 week. Check your groin site for bleeding and/or swelling daily following procedure and call your doctor immediately if it occurs or if you experience increased pain at the site. Follow up with your cardiologist in 1-2 weeks. You may call [facility] if you have any questions/concerns regarding your procedure [phone]. Your blood pressure will be controlled. Continue with your blood pressure medications; eat a heart healthy diet with low salt diet; exercise regularly (consult with your physician or cardiologist first); maintain a heart healthy weight; if you smoke - quit (A resource to help you stop smoking is the [facility] – phone number [phone].); include healthy ways to manage stress. Continue to follow with your primary care physician or cardiologist. Your LDL cholesterol will be less than 70mg/dL Continue with your cholesterol medications. Eat a heart healthy diet that is low in saturated fats and salt, and includes whole grains, fruits, vegetables and lean protein; exercise regularly (consult with your physician or cardiologist first); maintain a heart healthy weight; if you smoke - quit (A resource to help you stop smoking is the [facility] for Tobacco [facility] – phone number [phone].). Continue to follow with your primary physician or cardiologist.

## 2019-02-21 NOTE — DISCHARGE NOTE ADULT - CARE PLAN
Principal Discharge DX:	Coronary artery disease of native artery of native heart with stable angina pectoris  Goal:	Patient remains chest pain free and understands post cath discharge instructions.  Assessment and plan of treatment:	Do not stop your aspirin or Plavix unless instructed to do so by your cardiologist, they help keep your stented arteries open.   No heavy lifting, strenuous activity, bending, straining, or unnecessary stair climbing for 2 weeks. No driving for 2 days. You may shower 24 hours following the procedure but avoid baths/swimming for 1 week. Check your groin site for bleeding and/or swelling daily following procedure and call your doctor immediately if it occurs or if you experience increased pain at the site. Follow up with your cardiologist in 1-2 weeks. You may call [facility] if you have any questions/concerns regarding your procedure [phone].  Secondary Diagnosis:	Hypertension, unspecified type  Goal:	Your blood pressure will be controlled.  Assessment and plan of treatment:	Continue with your blood pressure medications; eat a heart healthy diet with low salt diet; exercise regularly (consult with your physician or cardiologist first); maintain a heart healthy weight; if you smoke - quit (A resource to help you stop smoking is the [facility] [facility] – phone number [phone].); include healthy ways to manage stress. Continue to follow with your primary care physician or cardiologist.  Secondary Diagnosis:	Hyperlipidemia, unspecified hyperlipidemia type  Goal:	Your LDL cholesterol will be less than 70mg/dL  Assessment and plan of treatment:	Continue with your cholesterol medications. Eat a heart healthy diet that is low in saturated fats and salt, and includes whole grains, fruits, vegetables and lean protein; exercise regularly (consult with your physician or cardiologist first); maintain a heart healthy weight; if you smoke - quit (A resource to help you stop smoking is the [facility] [employer] for Tobacco Control – phone number [phone].). Continue to follow with your primary physician or cardiologist.

## 2019-02-21 NOTE — DISCHARGE NOTE ADULT - HOSPITAL COURSE
HPI:  64 yo male pt with PMHx of HTN, HLD, CAD (2 MIs, s/p 7 stents - last 2010; on asa only), emphysema 2/2 Lung CA s/p RUL lobectomy(2013 Dr. Osborne), and Prostate Ca (no chemo or RTX needed), TAYA not on CPAP, family Hx of heart disease and former smoker sent by cardiologist Dr. Mcnamara to ER today for cath. Pt reports he has been feeling midsternal chest pain for past 2-3 weeks intermittently radiated to bilateral arms left>right lasting up to several hours at a time no change w/ eating or exertion, associated with SOB. pt denies abd pain, n/v/d/c, cough, fever. Stress test on 1/2017 showed normal (below), now came up for cardiac cath. pt still has 4/10 chest pain after Morphine in ER which is better from 8-9/10.   < from: Nuclear Stress Test-Pharmacologic (01.29.17 @ 12:30) >   * The left ventricle was normal in size. * Tracer uptake was homogeneous throughout the left ventricle.  * Normal study; no evidence for myocardial infarction or ischemia.  * Gated wall motion analysis was performed, and shows normal wall motion.  Post-stress gated wall motion analysis was performed (LVEF = 60 %) < end of copied text > (19 Feb 2019 10:50)    2/20 cardiac cath with one distal RCA stent. Right groin site without swelling, bleeding. 64 yo male pt with PMHx of HTN, HLD, CAD (2 MIs, s/p 7 stents - last 2010; on asa only), emphysema 2/2 Lung CA s/p RUL lobectomy(2013 Dr. Osborne), and Prostate Ca (no chemo or RTX needed), TAAY not on CPAP, family Hx of heart disease and former smoker sent by cardiologist Dr. Mcnamara to ER today for cath. Pt reports he has been feeling midsternal chest pain for past 2-3 weeks intermittently radiated to bilateral arms left>right lasting up to several hours at a time no change w/ eating or exertion, associated with SOB. pt denies abd pain, n/v/d/c, cough, fever. Stress test on 1/2017 showed normal (below), now came up for cardiac cath. pt still has 4/10 chest pain after Morphine in ER which is better from 8-9/10. S/p stent in LAD on 2/19, Intervention on distal RCA: drug-eluting stent.     < from: Cardiac Cath Lab - Adult (02.19.19 @ 14:35) >  LAD:   --  Mid LAD: There was a 90 % stenosis. CX:   --  Circumflex: Angiography showed minor luminal irregularities with no flow limiting lesions. RCA: Distal RCA: There was a 90 % stenosis.   < from: Nuclear Stress Test-Pharmacologic (01.29.17 @ 12:30) >   * The left ventricle was normal in size. * Tracer uptake was homogeneous throughout the left ventricle. * Normal study; no evidence for myocardial infarction or ischemia.  * Gated wall motion analysis was performed, and shows normal wall motion.  Post-stress gated wall motion analysis was performed (LVEF = 60 %)

## 2019-02-21 NOTE — DISCHARGE NOTE ADULT - PATIENT PORTAL LINK FT
You can access the [facility] Patient Portal, offered by [facility], by registering with the following website: [email or web address]

## 2019-02-21 NOTE — PROGRESS NOTE ADULT - SUBJECTIVE AND OBJECTIVE BOX
Patient is a 63y old  Male who presents with a chief complaint of chest pain (21 Feb 2019 00:45)          Allergies    adhesives (Blisters)  Percocet 5/325 (Pruritus)  Vicodin (Hives)    Intolerances        Medications:  ALBUTerol    90 MICROgram(s) HFA Inhaler 2 Puff(s) Inhalation every 6 hours PRN  ALPRAZolam 0.5 milliGRAM(s) Oral two times a day PRN  aluminum hydroxide/magnesium hydroxide/simethicone Suspension 30 milliLiter(s) Oral every 4 hours PRN  aspirin enteric coated 81 milliGRAM(s) Oral daily  atorvastatin 40 milliGRAM(s) Oral at bedtime  cholecalciferol 2000 Unit(s) Oral daily  clopidogrel Tablet 75 milliGRAM(s) Oral daily  hydrALAZINE 25 milliGRAM(s) Oral <User Schedule>  hydrALAZINE 50 milliGRAM(s) Oral <User Schedule>  losartan 100 milliGRAM(s) Oral daily  metoprolol tartrate 25 milliGRAM(s) Oral at bedtime  metoprolol tartrate 50 milliGRAM(s) Oral daily  multivitamin/minerals 1 Tablet(s) Oral daily  pantoprazole    Tablet 40 milliGRAM(s) Oral before breakfast  tamsulosin 0.4 milliGRAM(s) Oral two times a day      Vitals:  T(C): 36.8 (02-20-19 @ 21:25), Max: 36.8 (02-20-19 @ 21:25)  HR: 97 (02-20-19 @ 21:25) (55 - 97)  BP: 142/65 (02-20-19 @ 21:25) (113/81 - 149/86)  BP(mean): --  RR: 16 (02-20-19 @ 21:25) (16 - 17)  SpO2: 100% (02-20-19 @ 21:25) (95% - 100%)  Wt(kg): --  Daily     Daily   I&O's Summary    19 Feb 2019 07:01  -  20 Feb 2019 07:00  --------------------------------------------------------  IN: 660 mL / OUT: 250 mL / NET: 410 mL    20 Feb 2019 07:01  -  21 Feb 2019 02:09  --------------------------------------------------------  IN: 420 mL / OUT: 0 mL / NET: 420 mL          Physical Exam:  Appearance: Normal  Eyes: PERRL, EOMI  HENT: Normal oral mus[place]a, NC/AT  Cardiovascular: S1S2, RRR, No M/R/G, no JVD, No Lower extremity edema  Procedural Access Site: No hematoma, Non-tender to palpation, 2+ pulse, No bruit, No Ecchymosis  Respiratory: Clear to auscultation bilaterally  Gastrointestinal: Soft, Non tender, Normal Bowel Sounds  Musculoskeletal: No clubbing, No joint deformity   Neurologic: Non-focal  Lymphatic: No lymphadenopathy  Psychiatry: AAOx3, Mood & affect appropriate  Skin: No rashes, No ecchymoses, No cyanosis    02-21    137  |  100  |  21  ----------------------------<  110<H>  3.9   |  23  |  1.09    Ca    9.4      21 Feb 2019 00:43    TPro  7.6  /  Alb  4.6  /  TBili  0.2  /  DBili  x   /  AST  29  /  ALT  43  /  AlkPhos  66  02-19    PT/INR - ( 19 Feb 2019 09:57 )   PT: 10.7 sec;   INR: 0.94 ratio         PTT - ( 19 Feb 2019 09:57 )  PTT:28.9 sec      Serum Pro-Brain Natriuretic Peptide: 42 pg/mL (02-19 @ 09:57)    Lipid panel   Hgb A1c Hemoglobin A1C, Whole Blood: 7.3 % (02-20 @ 13:41)  Hemoglobin A1C, Whole Blood: 7.1 % (02-20 @ 03:07)                          12.8   12.1  )-----------( 283      ( 21 Feb 2019 00:43 )             39.0         ECG:    Cath: one distal RCA stent    Imaging:    Interpretation of Telemetry:

## 2019-02-21 NOTE — PROGRESS NOTE ADULT - ASSESSMENT
HPI:  62 yo male pt with PMHx of HTN, HLD, CAD (2 MIs, s/p 7 stents - last 2010; on asa only), emphysema 2/2 Lung CA s/p RUL lobectomy(2013 Dr. Osborne), and Prostate Ca (no chemo or RTX needed), TAYA not on CPAP, family Hx of heart disease and former smoker sent by cardiologist Dr. Mcnamara to ER today for cath. Pt reports he has been feeling midsternal chest pain for past 2-3 weeks intermittently radiated to bilateral arms left>right lasting up to several hours at a time no change w/ eating or exertion, associated with SOB. pt denies abd pain, n/v/d/c, cough, fever. Stress test on 1/2017 showed normal (below), now came up for cardiac cath. pt still has 4/10 chest pain after Morphine in ER which is better from 8-9/10.   < from: Nuclear Stress Test-Pharmacologic (01.29.17 @ 12:30) >   * The left ventricle was normal in size. * Tracer uptake was homogeneous throughout the left ventricle.  * Normal study; no evidence for myocardial infarction or ischemia.  * Gated wall motion analysis was performed, and shows normal wall motion.  Post-stress gated wall motion analysis was performed (LVEF = 60 %) < end of copied text > (19 Feb 2019 10:50)

## 2019-02-27 ENCOUNTER — INPATIENT (INPATIENT)
Facility: HOSPITAL | Age: 64
LOS: 0 days | Discharge: HOME | DRG: 287 | End: 2019-02-28
Attending: INTERNAL MEDICINE | Admitting: INTERNAL MEDICINE
Payer: COMMERCIAL

## 2019-02-27 VITALS
DIASTOLIC BLOOD PRESSURE: 74 MMHG | HEART RATE: 70 BPM | TEMPERATURE: 98 F | SYSTOLIC BLOOD PRESSURE: 124 MMHG | OXYGEN SATURATION: 97 % | RESPIRATION RATE: 18 BRPM | WEIGHT: 205.03 LBS

## 2019-02-27 DIAGNOSIS — I20.0 UNSTABLE ANGINA: ICD-10-CM

## 2019-02-27 DIAGNOSIS — I24.9 ACUTE ISCHEMIC HEART DISEASE, UNSPECIFIED: ICD-10-CM

## 2019-02-27 DIAGNOSIS — R73.9 HYPERGLYCEMIA, UNSPECIFIED: ICD-10-CM

## 2019-02-27 DIAGNOSIS — N40.0 BENIGN PROSTATIC HYPERPLASIA WITHOUT LOWER URINARY TRACT SYMPTOMS: ICD-10-CM

## 2019-02-27 DIAGNOSIS — C34.11 MALIGNANT NEOPLASM OF UPPER LOBE, RIGHT BRONCHUS OR LUNG: ICD-10-CM

## 2019-02-27 DIAGNOSIS — Z98.89 OTHER SPECIFIED POSTPROCEDURAL STATES: Chronic | ICD-10-CM

## 2019-02-27 PROBLEM — G47.33 OBSTRUCTIVE SLEEP APNEA (ADULT) (PEDIATRIC): Chronic | Status: ACTIVE | Noted: 2019-02-19

## 2019-02-27 PROBLEM — Z87.891 PERSONAL HISTORY OF NICOTINE DEPENDENCE: Chronic | Status: ACTIVE | Noted: 2019-02-19

## 2019-02-27 LAB
ALBUMIN SERPL ELPH-MCNC: 4.1 G/DL — SIGNIFICANT CHANGE UP (ref 3.3–5)
ALP SERPL-CCNC: 61 U/L — SIGNIFICANT CHANGE UP (ref 40–120)
ALT FLD-CCNC: 34 U/L — SIGNIFICANT CHANGE UP (ref 10–45)
ANION GAP SERPL CALC-SCNC: 15 MMOL/L — SIGNIFICANT CHANGE UP (ref 5–17)
APTT BLD: 30 SEC — SIGNIFICANT CHANGE UP (ref 27.5–36.3)
AST SERPL-CCNC: 18 U/L — SIGNIFICANT CHANGE UP (ref 10–40)
BILIRUB SERPL-MCNC: 0.4 MG/DL — SIGNIFICANT CHANGE UP (ref 0.2–1.2)
BUN SERPL-MCNC: 14 MG/DL — SIGNIFICANT CHANGE UP (ref 7–23)
CALCIUM SERPL-MCNC: 9.2 MG/DL — SIGNIFICANT CHANGE UP (ref 8.4–10.5)
CHLORIDE SERPL-SCNC: 104 MMOL/L — SIGNIFICANT CHANGE UP (ref 96–108)
CK MB BLD-MCNC: 0.2 % — SIGNIFICANT CHANGE UP (ref 0–3.5)
CK MB CFR SERPL CALC: 1.1 NG/ML — SIGNIFICANT CHANGE UP (ref 0–6.7)
CK SERPL-CCNC: 601 U/L — HIGH (ref 30–200)
CO2 SERPL-SCNC: 22 MMOL/L — SIGNIFICANT CHANGE UP (ref 22–31)
CREAT SERPL-MCNC: 1.03 MG/DL — SIGNIFICANT CHANGE UP (ref 0.5–1.3)
GLUCOSE SERPL-MCNC: 174 MG/DL — HIGH (ref 70–99)
HCT VFR BLD CALC: 37.5 % — LOW (ref 39–50)
HGB BLD-MCNC: 12.3 G/DL — LOW (ref 13–17)
INR BLD: 0.92 RATIO — SIGNIFICANT CHANGE UP (ref 0.88–1.16)
MCHC RBC-ENTMCNC: 27.7 PG — SIGNIFICANT CHANGE UP (ref 27–34)
MCHC RBC-ENTMCNC: 32.9 GM/DL — SIGNIFICANT CHANGE UP (ref 32–36)
MCV RBC AUTO: 84.2 FL — SIGNIFICANT CHANGE UP (ref 80–100)
NT-PROBNP SERPL-SCNC: 38 PG/ML — SIGNIFICANT CHANGE UP (ref 0–300)
PLATELET # BLD AUTO: 311 K/UL — SIGNIFICANT CHANGE UP (ref 150–400)
POTASSIUM SERPL-MCNC: 3.7 MMOL/L — SIGNIFICANT CHANGE UP (ref 3.5–5.3)
POTASSIUM SERPL-SCNC: 3.7 MMOL/L — SIGNIFICANT CHANGE UP (ref 3.5–5.3)
PROT SERPL-MCNC: 6.8 G/DL — SIGNIFICANT CHANGE UP (ref 6–8.3)
PROTHROM AB SERPL-ACNC: 10.5 SEC — SIGNIFICANT CHANGE UP (ref 10–12.9)
RBC # BLD: 4.46 M/UL — SIGNIFICANT CHANGE UP (ref 4.2–5.8)
RBC # FLD: 13.2 % — SIGNIFICANT CHANGE UP (ref 10.3–14.5)
SODIUM SERPL-SCNC: 141 MMOL/L — SIGNIFICANT CHANGE UP (ref 135–145)
TROPONIN T, HIGH SENSITIVITY RESULT: 22 NG/L — SIGNIFICANT CHANGE UP (ref 0–51)
TROPONIN T, HIGH SENSITIVITY RESULT: 7 NG/L — SIGNIFICANT CHANGE UP (ref 0–51)
WBC # BLD: 8.9 K/UL — SIGNIFICANT CHANGE UP (ref 3.8–10.5)
WBC # FLD AUTO: 8.9 K/UL — SIGNIFICANT CHANGE UP (ref 3.8–10.5)

## 2019-02-27 PROCEDURE — 93010 ELECTROCARDIOGRAM REPORT: CPT

## 2019-02-27 PROCEDURE — 99222 1ST HOSP IP/OBS MODERATE 55: CPT | Mod: GC

## 2019-02-27 PROCEDURE — 71045 X-RAY EXAM CHEST 1 VIEW: CPT | Mod: 26

## 2019-02-27 PROCEDURE — 99223 1ST HOSP IP/OBS HIGH 75: CPT

## 2019-02-27 PROCEDURE — 99285 EMERGENCY DEPT VISIT HI MDM: CPT | Mod: 25

## 2019-02-27 RX ORDER — CLOPIDOGREL BISULFATE 75 MG/1
75 TABLET, FILM COATED ORAL DAILY
Qty: 0 | Refills: 0 | Status: DISCONTINUED | OUTPATIENT
Start: 2019-02-27 | End: 2019-02-28

## 2019-02-27 RX ORDER — METOPROLOL TARTRATE 50 MG
1 TABLET ORAL
Qty: 0 | Refills: 0 | COMMUNITY

## 2019-02-27 RX ORDER — ASPIRIN/CALCIUM CARB/MAGNESIUM 324 MG
81 TABLET ORAL DAILY
Qty: 0 | Refills: 0 | Status: DISCONTINUED | OUTPATIENT
Start: 2019-02-28 | End: 2019-02-28

## 2019-02-27 RX ORDER — MULTIVIT-MIN/FERROUS GLUCONATE 9 MG/15 ML
1 LIQUID (ML) ORAL DAILY
Qty: 0 | Refills: 0 | Status: DISCONTINUED | OUTPATIENT
Start: 2019-02-27 | End: 2019-02-28

## 2019-02-27 RX ORDER — FENTANYL CITRATE 50 UG/ML
50 INJECTION INTRAVENOUS ONCE
Qty: 0 | Refills: 0 | Status: DISCONTINUED | OUTPATIENT
Start: 2019-02-27 | End: 2019-02-27

## 2019-02-27 RX ORDER — HYDRALAZINE HCL 50 MG
25 TABLET ORAL
Qty: 0 | Refills: 0 | Status: DISCONTINUED | OUTPATIENT
Start: 2019-02-27 | End: 2019-02-28

## 2019-02-27 RX ORDER — HYDRALAZINE HCL 50 MG
50 TABLET ORAL
Qty: 0 | Refills: 0 | Status: DISCONTINUED | OUTPATIENT
Start: 2019-02-27 | End: 2019-02-28

## 2019-02-27 RX ORDER — ENOXAPARIN SODIUM 100 MG/ML
40 INJECTION SUBCUTANEOUS EVERY 24 HOURS
Qty: 0 | Refills: 0 | Status: DISCONTINUED | OUTPATIENT
Start: 2019-02-27 | End: 2019-02-28

## 2019-02-27 RX ORDER — ASPIRIN/CALCIUM CARB/MAGNESIUM 324 MG
162 TABLET ORAL ONCE
Qty: 0 | Refills: 0 | Status: COMPLETED | OUTPATIENT
Start: 2019-02-27 | End: 2019-02-27

## 2019-02-27 RX ORDER — LOSARTAN POTASSIUM 100 MG/1
100 TABLET, FILM COATED ORAL DAILY
Qty: 0 | Refills: 0 | Status: DISCONTINUED | OUTPATIENT
Start: 2019-02-27 | End: 2019-02-28

## 2019-02-27 RX ORDER — TAMSULOSIN HYDROCHLORIDE 0.4 MG/1
0.4 CAPSULE ORAL AT BEDTIME
Qty: 0 | Refills: 0 | Status: DISCONTINUED | OUTPATIENT
Start: 2019-02-27 | End: 2019-02-28

## 2019-02-27 RX ORDER — MORPHINE SULFATE 50 MG/1
4 CAPSULE, EXTENDED RELEASE ORAL ONCE
Qty: 0 | Refills: 0 | Status: DISCONTINUED | OUTPATIENT
Start: 2019-02-27 | End: 2019-02-27

## 2019-02-27 RX ORDER — METOPROLOL TARTRATE 50 MG
50 TABLET ORAL THREE TIMES A DAY
Qty: 0 | Refills: 0 | Status: DISCONTINUED | OUTPATIENT
Start: 2019-02-27 | End: 2019-02-28

## 2019-02-27 RX ORDER — CHOLECALCIFEROL (VITAMIN D3) 125 MCG
1000 CAPSULE ORAL DAILY
Qty: 0 | Refills: 0 | Status: DISCONTINUED | OUTPATIENT
Start: 2019-02-27 | End: 2019-02-28

## 2019-02-27 RX ORDER — NITROGLYCERIN 6.5 MG
0.4 CAPSULE, EXTENDED RELEASE ORAL
Qty: 0 | Refills: 0 | Status: DISCONTINUED | OUTPATIENT
Start: 2019-02-27 | End: 2019-02-28

## 2019-02-27 RX ORDER — ALPRAZOLAM 0.25 MG
1 TABLET ORAL
Qty: 0 | Refills: 0 | COMMUNITY

## 2019-02-27 RX ORDER — PANTOPRAZOLE SODIUM 20 MG/1
40 TABLET, DELAYED RELEASE ORAL
Qty: 0 | Refills: 0 | Status: DISCONTINUED | OUTPATIENT
Start: 2019-02-27 | End: 2019-02-28

## 2019-02-27 RX ORDER — ATORVASTATIN CALCIUM 80 MG/1
80 TABLET, FILM COATED ORAL AT BEDTIME
Qty: 0 | Refills: 0 | Status: DISCONTINUED | OUTPATIENT
Start: 2019-02-27 | End: 2019-02-28

## 2019-02-27 RX ORDER — ALBUTEROL 90 UG/1
2 AEROSOL, METERED ORAL EVERY 6 HOURS
Qty: 0 | Refills: 0 | Status: DISCONTINUED | OUTPATIENT
Start: 2019-02-27 | End: 2019-02-28

## 2019-02-27 RX ADMIN — FENTANYL CITRATE 50 MICROGRAM(S): 50 INJECTION INTRAVENOUS at 11:13

## 2019-02-27 RX ADMIN — Medication 162 MILLIGRAM(S): at 11:02

## 2019-02-27 RX ADMIN — MORPHINE SULFATE 4 MILLIGRAM(S): 50 CAPSULE, EXTENDED RELEASE ORAL at 14:30

## 2019-02-27 RX ADMIN — Medication 0.4 MILLIGRAM(S): at 21:43

## 2019-02-27 RX ADMIN — Medication 50 MILLIGRAM(S): at 21:42

## 2019-02-27 RX ADMIN — TAMSULOSIN HYDROCHLORIDE 0.4 MILLIGRAM(S): 0.4 CAPSULE ORAL at 21:42

## 2019-02-27 RX ADMIN — ATORVASTATIN CALCIUM 80 MILLIGRAM(S): 80 TABLET, FILM COATED ORAL at 21:42

## 2019-02-27 RX ADMIN — Medication 25 MILLIGRAM(S): at 21:42

## 2019-02-27 RX ADMIN — Medication 0.4 MILLIGRAM(S): at 11:02

## 2019-02-27 NOTE — H&P ADULT - NSHPREVIEWOFSYSTEMS_GEN_ALL_CORE
No cough.  No hemoptysis.  No abdominal pain, no red blood per rectum.  No melena.  No fever, no chills, no rigors.  NO weight loss or anorexia.  No node swelling.  No thyroid enlargement  No dysuria, no hematuria.  No rash.  No HA no focal weakness.  No SI /HI.

## 2019-02-27 NOTE — H&P ADULT - FAMILY HISTORY
Family history of prostate cancer in father     Grandparent  Still living? Unknown  Family history of coronary artery disease in mother, Age at diagnosis: Age Unknown     Aunt  Still living? Yes, Estimated age: Age Unknown  Family history of coronary artery disease in mother, Age at diagnosis: Age Unknown

## 2019-02-27 NOTE — H&P ADULT - PROBLEM SELECTOR PLAN 4
ON Rx as above.   Would clarify with patient's outpatient physician recent followup given paternal history of prostate CA.

## 2019-02-27 NOTE — H&P ADULT - ATTENDING COMMENTS
NIGHT HOSPITALIST:   Patient aware of course and agrees with plan/care as above.   Given patient's comorbidities, patient's long term prognosis is guarded.  Emotional support provided to patient.   Care reviewed with covering NP/PA.  Care assumed by the DAY HOSPITALIST.    Davi Hudson MD  [phone]

## 2019-02-27 NOTE — PATIENT PROFILE ADULT - NSPROEDAABILITYLEARNOTHER_GEN_A_NUR
[FreeTextEntry7] : Appointment with medicine and neurology [FreeTextEntry3] : Dx- Tension headache  none

## 2019-02-27 NOTE — H&P ADULT - NSHPPHYSICALEXAM_GEN_ALL_CORE
Physical exam with a middle aged, chronically ill M in no acute distress.  Afebrile.  HR  62.  RR 14.  BP  119/80  97% on RA.  HEENT< PERRL< EOMI, oropharynx  Neck supple, no cervical adenopathy  No thyromegaly.  Chest clear  Cor s1 s2  Abdomen soft nontender, no rebound normal bowel sounds  Neurologic exam AxOx3.   speech fluent.  Cognition intact.  UE/LE 5/5  skin dry.  (refused to remove socks/shoes)  Ext no oedema.

## 2019-02-27 NOTE — ED PROVIDER NOTE - CLINICAL SUMMARY MEDICAL DECISION MAKING FREE TEXT BOX
Dr. Golden (Attending Physician)  63 year old male with ho cad, MI, s/p 9 stents pw midsternal chest and upper back pressure worse with exertion similar to previous MI, no tearing pain, not sudden in onset gradually worsening.  will check cardiac enzymes, ecg without stemi, consult cards for cath for unstable angina.

## 2019-02-27 NOTE — H&P ADULT - HISTORY OF PRESENT ILLNESS
NIGHT HOSPITALIST:   Patient UNKNOWN to me previously, assigned to me at this point via the ER and Dr. Llanos to admit this 64 y/o M--patient followed by Dr. Mcnamara as above--patient with a history of essential HTN, hyperlipidemia, CAD with past MI x 2 and apparently multiple PCI procedures, COPD with past RUL lobectomy in 2013 for lung CA presumed to be disease free, reported BPH, reported RA in 1990, patient S/P DC 2/21/19 with PCI and stent with LEFT chest pressure intermittently while at his shower at home at 1400.   Patient with NIGHT HOSPITALIST:   Patient UNKNOWN to me previously, assigned to me at this point via the ER and Dr. Llanos to admit this 62 y/o M--patient followed by Dr. Mcnamara as above--patient with a history of essential HTN, hyperlipidemia, CAD with past MI x 2 and apparently multiple PCI procedures, COPD with past RUL lobectomy in 2013 for lung CA presumed to be disease free, reported BPH, reported RA in 1990, patient S/P DC 2/21/19 with PCI and stent with LEFT chest pressure intermittently while at his shower at home at 1400.   Patient with no chest pain/pressure at present.  NO dyspnoea.  No N/V.

## 2019-02-27 NOTE — H&P ADULT - NSHPLABSRESULTS_GEN_ALL_CORE
WBC 8.9.  hgb 12.3.  Platelets of 311K>  INR 0.9    HS troponin 22>7.    Random glucose of 174.  Cr 1.0.  K+ 3.7.  EKG tracing reviewed with NSR at 65.    Chest radiograph reviewed with no infiltrate or effusion.

## 2019-02-27 NOTE — CONSULT NOTE ADULT - NSHPATTENDINGPLANDISCUSS_GEN_ALL_CORE
Plan discussed with cardiology fellow, Dr. YUMIKO Thakkar; patient seen and examined.       I was physically present for the key portions of the evaluation and management (E/M) service provided.    I agree with the above history, physical, and plan which I have reviewed and edited where appropriate.

## 2019-02-27 NOTE — H&P ADULT - ASSESSMENT
NIGHT HOSPITALIST:  Presentation of chest pain syndrome in the setting of patient with CAD with recent cardiac cath, COPD with past lobectomy for lung CA presumed to be in remission with suspicious presentation for ACS despite initial nondiagnostic chest radiograph, EKG, troponin.  Appreciated cardiology evaluation.

## 2019-02-27 NOTE — H&P ADULT - PROBLEM SELECTOR PLAN 1
See above.   Will follow HS troponin.  Tele.  Will defer further considerations for diagnostic and therapeutic approach to cardiology.   Will adjust patient's metoprolol to his dose of 50 TID and increase statin to maximum dose as tolerated.

## 2019-02-27 NOTE — CONSULT NOTE ADULT - ASSESSMENT
Pt is a 63 y.o. man with a PMHx of TAYA, prostate CA, HTN, HLD, DM, emphysema, CAD s/p 9 stents, had 2 placed last week to RCA and LAD by Dr. Leyva, coming in with CP that started yesterday.    #Chest pain: EKG with NSR, no ST/T wave abnormalities  -Follow up Troponin. Add on CK/CKMB. Continue to trend  -Serial EKG  -Continue asa/plavix/statin  -Continue home metoprolol, losartan  -Telemetry monitoring    #HTN: Currently normotensive  -Continue home metoprolol, losartan, hydralazine    Cardiology will continue to follow    Rambo Thakkar MD   Cardiology Fellow  #[id number] Pt is a 63 y.o. man with a PMHx of TAYA, prostate CA, HTN, HLD, DM, emphysema, CAD s/p 9 stents, had 2 placed last week to RCA and LAD by Dr. Leyva, coming in with CP that started yesterday.    #Chest pain: EKG with NSR, no ST/T wave abnormalities  -Follow up Troponin. Add on CK/CKMB. Continue to trend  -Serial EKG  -Continue asa/Plavix/statin  -Continue home metoprolol, losartan  -Telemetry monitoring    #HTN: Currently normotensive  -Continue home metoprolol, losartan, hydralazine    Cardiology will continue to follow    Rambo Thakkar MD   Cardiology Fellow  #[id number]    Jsaon Mcdaniel M.D.  Cardiology Consult Service  [phone] or [phone]
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
## 2019-02-27 NOTE — ED ADULT NURSE REASSESSMENT NOTE - NS ED NURSE REASSESS COMMENT FT1
Report received from Padmaja SAINI, VS repeated. Patient resting comfortably, denies chest pain/SOB/pain. Pt admitted awaiting bed

## 2019-02-27 NOTE — ED ADULT NURSE NOTE - OBJECTIVE STATEMENT
64 y/o male with pmhx of multiple cardiac stents c/o sternal cp that radiates to back associated with sob, dizziness, numbness and tingling, and diaphoresis that intiated yesterday.  pt denies any n/v at this time.  vss.  pulses are full and regular in strength to all extremities.  pt is awake, alert and respnosive to all stimuli.  no respiratory distress noted.  pt placed on cardiac monitor and is in NSR.  pt resting in bed.  will continue to monitor.

## 2019-02-27 NOTE — ED PROVIDER NOTE - OBJECTIVE STATEMENT
63 y.o. male history of CAD, 9 stents the most recent two were placed last week, pt follows up with Bob Mcnamara coming in with CP that started yesterday.  Pt was in the shower when he got some sternal chest discomfort described as pressure.  Pain radiates to the right side of the chest.  Pain is worse with exertion.  No cough, no fevers, no chills.  NO abd pain nausea or vomiting.  This pain is similar to when he has had stents placed in the past.  Pain is worse with exertion, better with rest but does occur at rest.
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
## 2019-02-27 NOTE — ED PROVIDER NOTE - ATTENDING CONTRIBUTION TO CARE
Dr. Golden (Attending Physician)  I performed a history and physical exam of the patient and discussed their management with the advanced care provider. I reviewed the advanced care provider's note and agree with the documented findings and plan of care. My medical decision making and objective findings are found above.

## 2019-02-28 VITALS
OXYGEN SATURATION: 99 % | RESPIRATION RATE: 18 BRPM | SYSTOLIC BLOOD PRESSURE: 128 MMHG | DIASTOLIC BLOOD PRESSURE: 83 MMHG | HEART RATE: 62 BPM | TEMPERATURE: 98 F

## 2019-02-28 LAB
ANION GAP SERPL CALC-SCNC: 14 MMOL/L — SIGNIFICANT CHANGE UP (ref 5–17)
BASOPHILS # BLD AUTO: 0.03 K/UL — SIGNIFICANT CHANGE UP (ref 0–0.2)
BASOPHILS NFR BLD AUTO: 0.3 % — SIGNIFICANT CHANGE UP (ref 0–2)
BUN SERPL-MCNC: 16 MG/DL — SIGNIFICANT CHANGE UP (ref 7–23)
CALCIUM SERPL-MCNC: 9.1 MG/DL — SIGNIFICANT CHANGE UP (ref 8.4–10.5)
CHLORIDE SERPL-SCNC: 103 MMOL/L — SIGNIFICANT CHANGE UP (ref 96–108)
CO2 SERPL-SCNC: 23 MMOL/L — SIGNIFICANT CHANGE UP (ref 22–31)
CREAT SERPL-MCNC: 1.05 MG/DL — SIGNIFICANT CHANGE UP (ref 0.5–1.3)
EOSINOPHIL # BLD AUTO: 0.25 K/UL — SIGNIFICANT CHANGE UP (ref 0–0.5)
EOSINOPHIL NFR BLD AUTO: 2.4 % — SIGNIFICANT CHANGE UP (ref 0–6)
GLUCOSE SERPL-MCNC: 114 MG/DL — HIGH (ref 70–99)
HBA1C BLD-MCNC: 7.3 % — HIGH (ref 4–5.6)
HCT VFR BLD CALC: 37 % — LOW (ref 39–50)
HCV AB S/CO SERPL IA: 0.09 S/CO — SIGNIFICANT CHANGE UP (ref 0–0.79)
HCV AB SERPL-IMP: SIGNIFICANT CHANGE UP
HGB BLD-MCNC: 11 G/DL — LOW (ref 13–17)
IMM GRANULOCYTES NFR BLD AUTO: 0.3 % — SIGNIFICANT CHANGE UP (ref 0–1.5)
LYMPHOCYTES # BLD AUTO: 3.54 K/UL — HIGH (ref 1–3.3)
LYMPHOCYTES # BLD AUTO: 33.7 % — SIGNIFICANT CHANGE UP (ref 13–44)
MCHC RBC-ENTMCNC: 26 PG — LOW (ref 27–34)
MCHC RBC-ENTMCNC: 29.7 GM/DL — LOW (ref 32–36)
MCV RBC AUTO: 87.5 FL — SIGNIFICANT CHANGE UP (ref 80–100)
MONOCYTES # BLD AUTO: 0.91 K/UL — HIGH (ref 0–0.9)
MONOCYTES NFR BLD AUTO: 8.7 % — SIGNIFICANT CHANGE UP (ref 2–14)
NEUTROPHILS # BLD AUTO: 5.74 K/UL — SIGNIFICANT CHANGE UP (ref 1.8–7.4)
NEUTROPHILS NFR BLD AUTO: 54.6 % — SIGNIFICANT CHANGE UP (ref 43–77)
PLATELET # BLD AUTO: 306 K/UL — SIGNIFICANT CHANGE UP (ref 150–400)
POTASSIUM SERPL-MCNC: 4 MMOL/L — SIGNIFICANT CHANGE UP (ref 3.5–5.3)
POTASSIUM SERPL-SCNC: 4 MMOL/L — SIGNIFICANT CHANGE UP (ref 3.5–5.3)
RBC # BLD: 4.23 M/UL — SIGNIFICANT CHANGE UP (ref 4.2–5.8)
RBC # FLD: 14.4 % — SIGNIFICANT CHANGE UP (ref 10.3–14.5)
SODIUM SERPL-SCNC: 140 MMOL/L — SIGNIFICANT CHANGE UP (ref 135–145)
TROPONIN T, HIGH SENSITIVITY RESULT: 15 NG/L — SIGNIFICANT CHANGE UP (ref 0–51)
WBC # BLD: 10.5 K/UL — SIGNIFICANT CHANGE UP (ref 3.8–10.5)
WBC # FLD AUTO: 10.5 K/UL — SIGNIFICANT CHANGE UP (ref 3.8–10.5)

## 2019-02-28 PROCEDURE — 82553 CREATINE MB FRACTION: CPT

## 2019-02-28 PROCEDURE — 85730 THROMBOPLASTIN TIME PARTIAL: CPT

## 2019-02-28 PROCEDURE — 96374 THER/PROPH/DIAG INJ IV PUSH: CPT

## 2019-02-28 PROCEDURE — 84484 ASSAY OF TROPONIN QUANT: CPT

## 2019-02-28 PROCEDURE — 96375 TX/PRO/DX INJ NEW DRUG ADDON: CPT

## 2019-02-28 PROCEDURE — 86803 HEPATITIS C AB TEST: CPT

## 2019-02-28 PROCEDURE — 76937 US GUIDE VASCULAR ACCESS: CPT | Mod: 26,GC

## 2019-02-28 PROCEDURE — 85027 COMPLETE CBC AUTOMATED: CPT

## 2019-02-28 PROCEDURE — 83036 HEMOGLOBIN GLYCOSYLATED A1C: CPT

## 2019-02-28 PROCEDURE — 85610 PROTHROMBIN TIME: CPT

## 2019-02-28 PROCEDURE — 99152 MOD SED SAME PHYS/QHP 5/>YRS: CPT

## 2019-02-28 PROCEDURE — C1887: CPT

## 2019-02-28 PROCEDURE — 82550 ASSAY OF CK (CPK): CPT

## 2019-02-28 PROCEDURE — 93454 CORONARY ARTERY ANGIO S&I: CPT | Mod: 26,GC

## 2019-02-28 PROCEDURE — 99233 SBSQ HOSP IP/OBS HIGH 50: CPT | Mod: GC

## 2019-02-28 PROCEDURE — 76937 US GUIDE VASCULAR ACCESS: CPT

## 2019-02-28 PROCEDURE — C1769: CPT

## 2019-02-28 PROCEDURE — 93454 CORONARY ARTERY ANGIO S&I: CPT

## 2019-02-28 PROCEDURE — 99285 EMERGENCY DEPT VISIT HI MDM: CPT | Mod: 25

## 2019-02-28 PROCEDURE — C1894: CPT

## 2019-02-28 PROCEDURE — 83880 ASSAY OF NATRIURETIC PEPTIDE: CPT

## 2019-02-28 PROCEDURE — 99152 MOD SED SAME PHYS/QHP 5/>YRS: CPT | Mod: GC

## 2019-02-28 PROCEDURE — 80048 BASIC METABOLIC PNL TOTAL CA: CPT

## 2019-02-28 PROCEDURE — 80053 COMPREHEN METABOLIC PANEL: CPT

## 2019-02-28 PROCEDURE — 93005 ELECTROCARDIOGRAM TRACING: CPT

## 2019-02-28 PROCEDURE — 71045 X-RAY EXAM CHEST 1 VIEW: CPT

## 2019-02-28 RX ORDER — ALPRAZOLAM 0.25 MG
1 TABLET ORAL
Qty: 0 | Refills: 0 | COMMUNITY

## 2019-02-28 RX ORDER — HYDRALAZINE HCL 50 MG
1 TABLET ORAL
Qty: 0 | Refills: 0 | COMMUNITY

## 2019-02-28 RX ORDER — METOPROLOL TARTRATE 50 MG
2 TABLET ORAL
Qty: 0 | Refills: 0 | COMMUNITY

## 2019-02-28 RX ORDER — TAMSULOSIN HYDROCHLORIDE 0.4 MG/1
1 CAPSULE ORAL
Qty: 0 | Refills: 0 | COMMUNITY

## 2019-02-28 RX ORDER — ATORVASTATIN CALCIUM 80 MG/1
1 TABLET, FILM COATED ORAL
Qty: 0 | Refills: 0 | COMMUNITY

## 2019-02-28 RX ORDER — METOPROLOL TARTRATE 50 MG
1 TABLET ORAL
Qty: 0 | Refills: 0 | COMMUNITY

## 2019-02-28 RX ORDER — CLOPIDOGREL BISULFATE 75 MG/1
1 TABLET, FILM COATED ORAL
Qty: 90 | Refills: 3 | OUTPATIENT

## 2019-02-28 RX ORDER — MULTIVIT-MIN/FERROUS GLUCONATE 9 MG/15 ML
1 LIQUID (ML) ORAL
Qty: 0 | Refills: 0 | COMMUNITY

## 2019-02-28 RX ORDER — LOSARTAN POTASSIUM 100 MG/1
1 TABLET, FILM COATED ORAL
Qty: 0 | Refills: 0 | COMMUNITY

## 2019-02-28 RX ORDER — METOPROLOL TARTRATE 50 MG
0 TABLET ORAL
Qty: 0 | Refills: 0 | COMMUNITY

## 2019-02-28 RX ORDER — METOPROLOL TARTRATE 50 MG
1 TABLET ORAL
Qty: 0 | Refills: 0 | COMMUNITY
Start: 2019-02-28

## 2019-02-28 RX ADMIN — PANTOPRAZOLE SODIUM 40 MILLIGRAM(S): 20 TABLET, DELAYED RELEASE ORAL at 06:13

## 2019-02-28 RX ADMIN — Medication 50 MILLIGRAM(S): at 06:13

## 2019-02-28 RX ADMIN — LOSARTAN POTASSIUM 100 MILLIGRAM(S): 100 TABLET, FILM COATED ORAL at 06:13

## 2019-02-28 NOTE — DISCHARGE NOTE ADULT - MEDICATION SUMMARY - MEDICATIONS TO TAKE
I will START or STAY ON the medications listed below when I get home from the hospital:    aspirin 81 mg oral tablet  -- 1 tab(s) by mouth once a day  -- Indication: For keep stent open    losartan 100 mg oral tablet  -- 1 tab(s) by mouth once a day  -- Indication: For Hypertension    Flomax 0.4 mg oral capsule  -- 1 cap(s) by mouth 2 times a day  -- Indication: For Benign prostatic hyperplasia without lower urinary tract symptoms    atorvastatin 40 mg oral tablet  -- 1 tab(s) by mouth once a day  -- Indication: For Hyperlipidemia    clopidogrel 75 mg oral tablet  -- 1 tab(s) by mouth once a day  -- Indication: For keep stent open    ALPRAZolam 0.5 mg oral tablet  -- 1 tab(s) by mouth 2 times a day, As Needed  -- Indication: For Anxiety    Metoprolol Tartrate 25 mg oral tablet  -- 1 tab(s) by mouth once a day (at bedtime) PM  -- Indication: For coronary artery disease    metoprolol tartrate 50 mg oral tablet  -- 1 tab(s) by mouth once a day in AM  -- Indication: For coronary artery disease    ProAir HFA 90 mcg/inh inhalation aerosol  -- 2 puff(s) inhaled 4 times a day, As Needed  -- Indication: For Breathing    Protonix 40 mg oral delayed release tablet  -- 1 tab(s) by mouth once a day  -- Indication: For Acid reflux    hydrALAZINE 25 mg oral tablet  -- 1 tab(s) by mouth once a day (at bedtime)  -- Indication: For Hypertension    hydrALAZINE 50 mg oral tablet  -- 1 tab(s) by mouth once a day (in the morning)  -- Indication: For Hypertension    Centrum Adults oral tablet  -- 1 tab(s) by mouth once a day  -- Indication: For supplement    Vitamin D3 2000 intl units oral capsule  -- 1 cap(s) by mouth once a day  -- Indication: For supplement

## 2019-02-28 NOTE — DISCHARGE NOTE ADULT - HOSPITAL COURSE
64 y/o M--patient followed by Dr. Mcnamara as above--patient with a history of essential HTN, hyperlipidemia, CAD with past MI x 2 and apparently multiple PCI procedures, COPD with past RUL lobectomy in 2013 for lung CA presumed to be disease free, reported BPH, reported RA in 1990, patient S/P DC 2/21/19 with PCI and stent with LEFT chest pressure intermittently while at his shower at home at 1400.   Patient with no chest pain/pressure at present.  NO dyspnoea.  Pt underwent cardiac cath that revealed patient LAD and RCA stents. Pt was access via right femoral artery: site without hematoma or bleeding after ambulation and pt discharged with follow up at Dr Mcnamara's office. Plan discussed with Dr Llanos (hospitalist).

## 2019-02-28 NOTE — DISCHARGE NOTE ADULT - PLAN OF CARE
You will be free of chest pain or shortness of breath. Continue your medications. Do not stop Aspirin or Plavix unless instructed by your cardiologist.  No heavy lifting, strenuous activity, bending, straining or unnecessary stair climbing for 2 weeks. No sex for 1 week.  No driving for 2 days. You may shower 24 hours following procedure but avoid baths and swimming for 1 week. Check groin site for bleeding and/or swelling daily following procedure. Call your doctor/cardiologist immediately for bleeding or swelling or if you have increased/persistent pain or drainage at the site. Follow up with your cardiologist in 1- 2 weeks. You may call [facility] at [phone] or [phone] after office hours and weekends with any questions or concerns following your procedure.   DO NOT STOP ASPIRIN OR PLAVIX UNLESS INSTRUCTED BY CARDIOLOGIST

## 2019-02-28 NOTE — DISCHARGE NOTE ADULT - CARE PLAN
Principal Discharge DX:	Unstable angina  Goal:	You will be free of chest pain or shortness of breath.  Assessment and plan of treatment:	Continue your medications. Do not stop Aspirin or Plavix unless instructed by your cardiologist.  No heavy lifting, strenuous activity, bending, straining or unnecessary stair climbing for 2 weeks. No sex for 1 week.  No driving for 2 days. You may shower 24 hours following procedure but avoid baths and swimming for 1 week. Check groin site for bleeding and/or swelling daily following procedure. Call your doctor/cardiologist immediately for bleeding or swelling or if you have increased/persistent pain or drainage at the site. Follow up with your cardiologist in 1- 2 weeks. You may call [facility] at [phone] or [phone] after office hours and weekends with any questions or concerns following your procedure.   DO NOT STOP ASPIRIN OR PLAVIX UNLESS INSTRUCTED BY CARDIOLOGIST

## 2019-02-28 NOTE — PROGRESS NOTE ADULT - ASSESSMENT
Pt is a 63 y.o. man with a PMHx of TAYA, prostate CA, HTN, HLD, DM, emphysema, CAD s/p 9 stents, had 2 placed last week to RCA and LAD by Dr. Leyva, coming in with CP that started yesterday.    #Chest pain: EKG with NSR, no ST/T wave abnormalities. Trop/CKMB negative. CP resolved   -Continue asa/Plavix/statin  -Continue home metoprolol, losartan    #HTN: Currently normotensive  -Continue home metoprolol, losartan, hydralazine    Ok to discharge from cardiology perspective with follow up with Dr. Anders Thakkar MD   Cardiology Fellow  #[id number] Pt is a 63 y.o. man with a PMHx of TAYA, prostate CA, HTN, HLD, DM, emphysema.  Hx. CAD s/p 9 stents, 2 placed last week to RCA and LAD by Dr. Leyva.  P/W with atypical CP that started yesterday.    #Chest pain:   EKG with NSR, no ST/T wave abnormalities. Trop/CKMB negative; hsTrop trend flat. CP resolved   -Continue asa/Plavix/statin  -Continue home metoprolol, losartan    #HTN: Currently normotensive  -Continue home metoprolol, losartan, hydralazine    Ok to discharge from cardiology perspective with follow up with Dr. Anders Thakkar MD   Cardiology Fellow  #[id number]    Jason Mcdaniel M.D.  Cardiology Consult Service  [phone] or [phone]
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
## 2019-02-28 NOTE — DISCHARGE NOTE ADULT - CARE PROVIDER_API CALL
Bob Mcnamara)  Cardiology; Internal Medicine  [address] [place] [address]  Phone: [phone]  Fax: [phone]  Follow Up Time:

## 2019-02-28 NOTE — DISCHARGE NOTE ADULT - PATIENT PORTAL LINK FT
You can access the [facility] Portal, offered by [facility], by registering with the following website: [email or web address]

## 2019-02-28 NOTE — DISCHARGE NOTE ADULT - CARE PROVIDERS DIRECT ADDRESSES
[email or web address]

## 2019-03-07 ENCOUNTER — NON-APPOINTMENT (OUTPATIENT)
Age: 64
End: 2019-03-07

## 2019-03-07 ENCOUNTER — APPOINTMENT (OUTPATIENT)
Dept: CARDIOLOGY | Facility: CLINIC | Age: 64
End: 2019-03-07
Payer: MEDICARE

## 2019-03-07 VITALS
HEIGHT: 66 IN | HEART RATE: 61 BPM | TEMPERATURE: 98.1 F | OXYGEN SATURATION: 99 % | DIASTOLIC BLOOD PRESSURE: 80 MMHG | SYSTOLIC BLOOD PRESSURE: 120 MMHG | BODY MASS INDEX: 32.95 KG/M2 | WEIGHT: 205 LBS

## 2019-03-07 DIAGNOSIS — T14.8XXA OTHER INJURY OF UNSPECIFIED BODY REGION, INITIAL ENCOUNTER: ICD-10-CM

## 2019-03-07 PROCEDURE — 93000 ELECTROCARDIOGRAM COMPLETE: CPT

## 2019-03-07 PROCEDURE — 99214 OFFICE O/P EST MOD 30 MIN: CPT

## 2019-03-07 RX ORDER — GABAPENTIN 300 MG/1
300 CAPSULE ORAL
Refills: 0 | Status: DISCONTINUED | COMMUNITY
End: 2019-03-07

## 2019-03-07 RX ORDER — TRAMADOL HYDROCHLORIDE 50 MG/1
50 TABLET, COATED ORAL TWICE DAILY
Qty: 14 | Refills: 0 | Status: COMPLETED | COMMUNITY
Start: 2019-03-07 | End: 2019-03-07

## 2019-03-07 RX ORDER — AZITHROMYCIN 250 MG/1
250 TABLET, FILM COATED ORAL
Qty: 6 | Refills: 0 | Status: DISCONTINUED | COMMUNITY
Start: 2019-02-15 | End: 2019-03-07

## 2019-03-07 NOTE — PHYSICAL EXAM
[General Appearance - Well Developed] : well developed [Normal Appearance] : normal appearance [Well Groomed] : well groomed [General Appearance - Well Nourished] : well nourished [No Deformities] : no deformities [General Appearance - In No Acute Distress] : no acute distress [Normal Conjunctiva] : the conjunctiva exhibited no abnormalities [Eyelids - No Xanthelasma] : the eyelids demonstrated no xanthelasmas [Normal Oral Mucosa] : normal oral mucosa [No Oral Pallor] : no oral pallor [No Oral Cyanosis] : no oral cyanosis [Normal Jugular Venous A Waves Present] : normal jugular venous A waves present [Normal Jugular Venous V Waves Present] : normal jugular venous V waves present [No Jugular Venous Nichols A Waves] : no jugular venous nichols A waves [Respiration, Rhythm And Depth] : normal respiratory rhythm and effort [Exaggerated Use Of Accessory Muscles For Inspiration] : no accessory muscle use [Auscultation Breath Sounds / Voice Sounds] : lungs were clear to auscultation bilaterally [Heart Rate And Rhythm] : heart rate and rhythm were normal [Heart Sounds] : normal S1 and S2 [Murmurs] : no murmurs present [Abdomen Soft] : soft [Abdomen Tenderness] : non-tender [Abdomen Mass (___ Cm)] : no abdominal mass palpated [Abnormal Walk] : normal gait [Gait - Sufficient For Exercise Testing] : the gait was sufficient for exercise testing [Skin Color & Pigmentation] : normal skin color and pigmentation [] : no rash [No Venous Stasis] : no venous stasis [Skin Lesions] : no skin lesions [No Skin Ulcers] : no skin ulcer [No Xanthoma] : no  xanthoma was observed [Oriented To Time, Place, And Person] : oriented to person, place, and time [Affect] : the affect was normal [Mood] : the mood was normal [No Anxiety] : not feeling anxious [FreeTextEntry1] : small hematoma right femoral area no bruit

## 2019-03-07 NOTE — HISTORY OF PRESENT ILLNESS
[FreeTextEntry1] : pt presents for f/u pt s/p ptci of lad and rca 2/19/18.pt with recurrent chest pain s/p repeat er eval and repeat cath all ok .pt with persistent chest discomfort  improved stipp persistent and pt with ocassional dyspnea pt denies any  dizziness ,lightheadedness ,nausea vomiting diaphoresis pt with persistent back and hip pain \par

## 2019-03-08 LAB
ANION GAP SERPL CALC-SCNC: 12 MMOL/L
APPEARANCE: CLEAR
BACTERIA: NEGATIVE
BASOPHILS # BLD AUTO: 0.03 K/UL
BASOPHILS NFR BLD AUTO: 0.4 %
BILIRUBIN URINE: NEGATIVE
BLOOD URINE: NEGATIVE
BUN SERPL-MCNC: 11 MG/DL
CALCIUM SERPL-MCNC: 9.6 MG/DL
CHLORIDE SERPL-SCNC: 101 MMOL/L
CHOLEST SERPL-MCNC: 167 MG/DL
CO2 SERPL-SCNC: 25 MMOL/L
COLOR: NORMAL
CREAT SERPL-MCNC: 1.04 MG/DL
DEPRECATED D DIMER PPP IA-ACNC: <150 NG/ML DDU
EOSINOPHIL # BLD AUTO: 0.24 K/UL
EOSINOPHIL NFR BLD AUTO: 3 %
GLUCOSE QUALITATIVE U: NEGATIVE
GLUCOSE SERPL-MCNC: 108 MG/DL
HCT VFR BLD CALC: 40.3 %
HGB BLD-MCNC: 11.7 G/DL
HYALINE CASTS: 0 /LPF
IMM GRANULOCYTES NFR BLD AUTO: 0.2 %
KETONES URINE: NEGATIVE
LEUKOCYTE ESTERASE URINE: NEGATIVE
LYMPHOCYTES # BLD AUTO: 2.88 K/UL
LYMPHOCYTES NFR BLD AUTO: 35.8 %
MAN DIFF?: NORMAL
MCHC RBC-ENTMCNC: 25.8 PG
MCHC RBC-ENTMCNC: 29 GM/DL
MCV RBC AUTO: 88.8 FL
MICROSCOPIC-UA: NORMAL
MONOCYTES # BLD AUTO: 0.81 K/UL
MONOCYTES NFR BLD AUTO: 10.1 %
NEUTROPHILS # BLD AUTO: 4.07 K/UL
NEUTROPHILS NFR BLD AUTO: 50.5 %
NITRITE URINE: NEGATIVE
PH URINE: 6.5
PLATELET # BLD AUTO: 316 K/UL
POTASSIUM SERPL-SCNC: 4.3 MMOL/L
PROTEIN URINE: NEGATIVE
RBC # BLD: 4.54 M/UL
RBC # FLD: 14 %
RED BLOOD CELLS URINE: 1 /HPF
SODIUM SERPL-SCNC: 138 MMOL/L
SPECIFIC GRAVITY URINE: 1.01
SQUAMOUS EPITHELIAL CELLS: 0 /HPF
UROBILINOGEN URINE: NORMAL
WBC # FLD AUTO: 8.05 K/UL
WHITE BLOOD CELLS URINE: 0 /HPF

## 2019-03-26 ENCOUNTER — APPOINTMENT (OUTPATIENT)
Dept: CARDIOLOGY | Facility: CLINIC | Age: 64
End: 2019-03-26
Payer: MEDICARE

## 2019-03-26 ENCOUNTER — NON-APPOINTMENT (OUTPATIENT)
Age: 64
End: 2019-03-26

## 2019-03-26 VITALS
DIASTOLIC BLOOD PRESSURE: 70 MMHG | WEIGHT: 204 LBS | SYSTOLIC BLOOD PRESSURE: 122 MMHG | HEIGHT: 66 IN | HEART RATE: 72 BPM | BODY MASS INDEX: 32.78 KG/M2 | TEMPERATURE: 97.9 F | OXYGEN SATURATION: 99 %

## 2019-03-26 DIAGNOSIS — Z01.818 ENCOUNTER FOR OTHER PREPROCEDURAL EXAMINATION: ICD-10-CM

## 2019-03-26 PROCEDURE — 99213 OFFICE O/P EST LOW 20 MIN: CPT

## 2019-03-26 PROCEDURE — 93000 ELECTROCARDIOGRAM COMPLETE: CPT

## 2019-03-26 NOTE — HISTORY OF PRESENT ILLNESS
[Preoperative Visit] : for a medical evaluation prior to surgery [Scheduled Procedure ___] : a [unfilled] [Date of Surgery ___] : on [unfilled] [Surgeon Name ___] : surgeon: [unfilled] [Good] : Good [FreeTextEntry1] : pt presents for cv clearance prior to dental work .pt with resolved c.p doing well on ranexa .pt s/p double vessel ptci pt denies any chest  pain dizziness ,lightheadedness ,nausea vomiting diaphoresis\par

## 2019-03-26 NOTE — PHYSICAL EXAM
[General Appearance - Well Developed] : well developed [Normal Appearance] : normal appearance [Well Groomed] : well groomed [General Appearance - Well Nourished] : well nourished [No Deformities] : no deformities [General Appearance - In No Acute Distress] : no acute distress [Normal Conjunctiva] : the conjunctiva exhibited no abnormalities [Eyelids - No Xanthelasma] : the eyelids demonstrated no xanthelasmas [Normal Oral Mucosa] : normal oral mucosa [No Oral Pallor] : no oral pallor [No Oral Cyanosis] : no oral cyanosis [Normal Jugular Venous A Waves Present] : normal jugular venous A waves present [Normal Jugular Venous V Waves Present] : normal jugular venous V waves present [No Jugular Venous Nichols A Waves] : no jugular venous nichols A waves [Respiration, Rhythm And Depth] : normal respiratory rhythm and effort [Exaggerated Use Of Accessory Muscles For Inspiration] : no accessory muscle use [Auscultation Breath Sounds / Voice Sounds] : lungs were clear to auscultation bilaterally [Heart Rate And Rhythm] : heart rate and rhythm were normal [Heart Sounds] : normal S1 and S2 [Murmurs] : no murmurs present [Abdomen Soft] : soft [Abdomen Tenderness] : non-tender [Abdomen Mass (___ Cm)] : no abdominal mass palpated [Abnormal Walk] : normal gait [Gait - Sufficient For Exercise Testing] : the gait was sufficient for exercise testing [Nail Clubbing] : no clubbing of the fingernails [Cyanosis, Localized] : no localized cyanosis [Petechial Hemorrhages (___cm)] : no petechial hemorrhages [Skin Color & Pigmentation] : normal skin color and pigmentation [] : no rash [No Venous Stasis] : no venous stasis [Skin Lesions] : no skin lesions [No Skin Ulcers] : no skin ulcer [No Xanthoma] : no  xanthoma was observed [Oriented To Time, Place, And Person] : oriented to person, place, and time [Affect] : the affect was normal [Mood] : the mood was normal [No Anxiety] : not feeling anxious

## 2019-04-12 ENCOUNTER — MEDICATION RENEWAL (OUTPATIENT)
Age: 64
End: 2019-04-12

## 2019-04-16 ENCOUNTER — RECORD ABSTRACTING (OUTPATIENT)
Age: 64
End: 2019-04-16

## 2019-04-16 DIAGNOSIS — Z87.19 PERSONAL HISTORY OF OTHER DISEASES OF THE DIGESTIVE SYSTEM: ICD-10-CM

## 2019-04-16 DIAGNOSIS — Z87.39 PERSONAL HISTORY OF OTHER DISEASES OF THE MUSCULOSKELETAL SYSTEM AND CONNECTIVE TISSUE: ICD-10-CM

## 2019-04-16 DIAGNOSIS — Z80.0 FAMILY HISTORY OF MALIGNANT NEOPLASM OF DIGESTIVE ORGANS: ICD-10-CM

## 2019-04-16 DIAGNOSIS — Z85.118 PERSONAL HISTORY OF OTHER MALIGNANT NEOPLASM OF BRONCHUS AND LUNG: ICD-10-CM

## 2019-04-16 DIAGNOSIS — Z95.5 PRESENCE OF CORONARY ANGIOPLASTY IMPLANT AND GRAFT: ICD-10-CM

## 2019-04-16 DIAGNOSIS — Z87.891 PERSONAL HISTORY OF NICOTINE DEPENDENCE: ICD-10-CM

## 2019-04-16 DIAGNOSIS — Z86.2 PERSONAL HISTORY OF DISEASES OF THE BLOOD AND BLOOD-FORMING ORGANS AND CERTAIN DISORDERS INVOLVING THE IMMUNE MECHANISM: ICD-10-CM

## 2019-04-16 DIAGNOSIS — Z86.010 PERSONAL HISTORY OF COLONIC POLYPS: ICD-10-CM

## 2019-04-16 DIAGNOSIS — Z80.41 FAMILY HISTORY OF MALIGNANT NEOPLASM OF OVARY: ICD-10-CM

## 2019-04-16 DIAGNOSIS — Z87.09 PERSONAL HISTORY OF OTHER DISEASES OF THE RESPIRATORY SYSTEM: ICD-10-CM

## 2019-04-23 ENCOUNTER — RECORD ABSTRACTING (OUTPATIENT)
Age: 64
End: 2019-04-23

## 2019-04-23 ENCOUNTER — NON-APPOINTMENT (OUTPATIENT)
Age: 64
End: 2019-04-23

## 2019-04-23 ENCOUNTER — APPOINTMENT (OUTPATIENT)
Dept: CARDIOLOGY | Facility: CLINIC | Age: 64
End: 2019-04-23
Payer: MEDICARE

## 2019-04-23 VITALS
OXYGEN SATURATION: 99 % | WEIGHT: 204 LBS | TEMPERATURE: 98.3 F | BODY MASS INDEX: 32.78 KG/M2 | HEART RATE: 76 BPM | HEIGHT: 66 IN | SYSTOLIC BLOOD PRESSURE: 118 MMHG | DIASTOLIC BLOOD PRESSURE: 70 MMHG

## 2019-04-23 DIAGNOSIS — Z87.898 PERSONAL HISTORY OF OTHER SPECIFIED CONDITIONS: ICD-10-CM

## 2019-04-23 PROCEDURE — 99213 OFFICE O/P EST LOW 20 MIN: CPT

## 2019-04-23 PROCEDURE — 93000 ELECTROCARDIOGRAM COMPLETE: CPT

## 2019-04-23 NOTE — PHYSICAL EXAM
[General Appearance - Well Developed] : well developed [Normal Appearance] : normal appearance [Well Groomed] : well groomed [General Appearance - Well Nourished] : well nourished [No Deformities] : no deformities [General Appearance - In No Acute Distress] : no acute distress [Normal Conjunctiva] : the conjunctiva exhibited no abnormalities [Eyelids - No Xanthelasma] : the eyelids demonstrated no xanthelasmas [Normal Oral Mucosa] : normal oral mucosa [No Oral Pallor] : no oral pallor [No Oral Cyanosis] : no oral cyanosis [Normal Jugular Venous A Waves Present] : normal jugular venous A waves present [Normal Jugular Venous V Waves Present] : normal jugular venous V waves present [No Jugular Venous Nichols A Waves] : no jugular venous nichols A waves [Respiration, Rhythm And Depth] : normal respiratory rhythm and effort [Exaggerated Use Of Accessory Muscles For Inspiration] : no accessory muscle use [Auscultation Breath Sounds / Voice Sounds] : lungs were clear to auscultation bilaterally [Heart Rate And Rhythm] : heart rate and rhythm were normal [Heart Sounds] : normal S1 and S2 [Murmurs] : no murmurs present [Abdomen Soft] : soft [Abdomen Tenderness] : non-tender [Abdomen Mass (___ Cm)] : no abdominal mass palpated [Abnormal Walk] : normal gait [Gait - Sufficient For Exercise Testing] : the gait was sufficient for exercise testing [Nail Clubbing] : no clubbing of the fingernails [Cyanosis, Localized] : no localized cyanosis [Petechial Hemorrhages (___cm)] : no petechial hemorrhages [Skin Color & Pigmentation] : normal skin color and pigmentation [] : no rash [No Venous Stasis] : no venous stasis [Skin Lesions] : no skin lesions [No Skin Ulcers] : no skin ulcer [No Xanthoma] : no  xanthoma was observed

## 2019-04-23 NOTE — HISTORY OF PRESENT ILLNESS
[FreeTextEntry1] : pt presents for f/u pt s/p double vessel ptci .pt on ranexa here for f/u .pt tolerating pt denies any chest  pain dizziness ,lightheadedness ,nausea vomiting diaphoresis\par

## 2019-04-28 LAB
ALBUMIN SERPL ELPH-MCNC: 4.5 G/DL
ALP BLD-CCNC: 70 U/L
ALT SERPL-CCNC: 20 U/L
ANION GAP SERPL CALC-SCNC: 15 MMOL/L
AST SERPL-CCNC: 18 U/L
BASOPHILS # BLD AUTO: 0.02 K/UL
BASOPHILS NFR BLD AUTO: 0.3 %
BILIRUB SERPL-MCNC: 0.5 MG/DL
BUN SERPL-MCNC: 15 MG/DL
CALCIUM SERPL-MCNC: 9.5 MG/DL
CHLORIDE SERPL-SCNC: 103 MMOL/L
CO2 SERPL-SCNC: 24 MMOL/L
CREAT SERPL-MCNC: 1.22 MG/DL
EOSINOPHIL # BLD AUTO: 0.19 K/UL
EOSINOPHIL NFR BLD AUTO: 2.4 %
ESTIMATED AVERAGE GLUCOSE: 146 MG/DL
FERRITIN SERPL-MCNC: 136 NG/ML
FOLATE SERPL-MCNC: >20 NG/ML
GLUCOSE SERPL-MCNC: 126 MG/DL
HAPTOGLOB SERPL-MCNC: 313 MG/DL
HBA1C MFR BLD HPLC: 6.7 %
HCT VFR BLD CALC: 40.6 %
HGB BLD-MCNC: 12.2 G/DL
IMM GRANULOCYTES NFR BLD AUTO: 0.4 %
IRON SERPL-MCNC: 61 UG/DL
LDH SERPL-CCNC: 157 U/L
LYMPHOCYTES # BLD AUTO: 3.45 K/UL
LYMPHOCYTES NFR BLD AUTO: 44.2 %
MAN DIFF?: NORMAL
MCHC RBC-ENTMCNC: 26.9 PG
MCHC RBC-ENTMCNC: 30 GM/DL
MCV RBC AUTO: 89.4 FL
MONOCYTES # BLD AUTO: 0.79 K/UL
MONOCYTES NFR BLD AUTO: 10.1 %
NEUTROPHILS # BLD AUTO: 3.33 K/UL
NEUTROPHILS NFR BLD AUTO: 42.6 %
PLATELET # BLD AUTO: 278 K/UL
POTASSIUM SERPL-SCNC: 4.2 MMOL/L
PROT SERPL-MCNC: 6.9 G/DL
RBC # BLD: 4.54 M/UL
RBC # FLD: 14.7 %
SODIUM SERPL-SCNC: 142 MMOL/L
TRANSFERRIN SERPL-MCNC: 251 MG/DL
TSH SERPL-ACNC: 1.24 UIU/ML
VIT B12 SERPL-MCNC: 1169 PG/ML
WBC # FLD AUTO: 7.81 K/UL

## 2019-07-15 ENCOUNTER — FORM ENCOUNTER (OUTPATIENT)
Age: 64
End: 2019-07-15

## 2019-07-15 ENCOUNTER — MEDICATION RENEWAL (OUTPATIENT)
Age: 64
End: 2019-07-15

## 2019-07-16 ENCOUNTER — APPOINTMENT (OUTPATIENT)
Dept: CT IMAGING | Facility: IMAGING CENTER | Age: 64
End: 2019-07-16
Payer: MEDICARE

## 2019-07-16 ENCOUNTER — OUTPATIENT (OUTPATIENT)
Dept: OUTPATIENT SERVICES | Facility: HOSPITAL | Age: 64
LOS: 1 days | End: 2019-07-16
Payer: MEDICARE

## 2019-07-16 DIAGNOSIS — Z98.89 OTHER SPECIFIED POSTPROCEDURAL STATES: Chronic | ICD-10-CM

## 2019-07-16 DIAGNOSIS — C34.90 MALIGNANT NEOPLASM OF UNSPECIFIED PART OF UNSPECIFIED BRONCHUS OR LUNG: ICD-10-CM

## 2019-07-16 PROCEDURE — 71250 CT THORAX DX C-: CPT | Mod: 26

## 2019-07-16 PROCEDURE — 71250 CT THORAX DX C-: CPT

## 2019-07-23 ENCOUNTER — APPOINTMENT (OUTPATIENT)
Dept: THORACIC SURGERY | Facility: CLINIC | Age: 64
End: 2019-07-23
Payer: MEDICARE

## 2019-07-23 VITALS
OXYGEN SATURATION: 97 % | SYSTOLIC BLOOD PRESSURE: 112 MMHG | HEIGHT: 66 IN | TEMPERATURE: 98.5 F | WEIGHT: 196 LBS | BODY MASS INDEX: 31.5 KG/M2 | HEART RATE: 68 BPM | RESPIRATION RATE: 16 BRPM | DIASTOLIC BLOOD PRESSURE: 69 MMHG

## 2019-07-23 PROCEDURE — 99214 OFFICE O/P EST MOD 30 MIN: CPT

## 2019-07-23 RX ORDER — ATORVASTATIN CALCIUM 40 MG/1
40 TABLET, FILM COATED ORAL
Qty: 90 | Refills: 3 | Status: DISCONTINUED | COMMUNITY
Start: 2019-04-12 | End: 2019-07-23

## 2019-07-23 RX ORDER — METHYLPREDNISOLONE 4 MG/1
4 TABLET ORAL
Qty: 1 | Refills: 0 | Status: DISCONTINUED | COMMUNITY
Start: 2019-03-07 | End: 2019-07-23

## 2019-07-23 RX ORDER — PANTOPRAZOLE 40 MG/1
TABLET, DELAYED RELEASE ORAL
Refills: 0 | Status: DISCONTINUED | COMMUNITY
End: 2019-07-23

## 2019-07-23 RX ORDER — METHYLPREDNISOLONE 4 MG/1
4 TABLET ORAL
Qty: 1 | Refills: 0 | Status: DISCONTINUED | COMMUNITY
Start: 2019-02-15 | End: 2019-07-23

## 2019-07-23 RX ORDER — RANOLAZINE 500 MG/1
500 TABLET, FILM COATED, EXTENDED RELEASE ORAL
Qty: 60 | Refills: 0 | Status: DISCONTINUED | COMMUNITY
Start: 2019-03-07 | End: 2019-07-23

## 2019-07-23 RX ORDER — LOSARTAN POTASSIUM 100 MG/1
100 TABLET, FILM COATED ORAL DAILY
Qty: 90 | Refills: 3 | Status: DISCONTINUED | COMMUNITY
Start: 2019-04-12 | End: 2019-07-23

## 2019-07-24 NOTE — PHYSICAL EXAM
[Sclera] : the sclera and conjunctiva were normal [Neck Appearance] : the appearance of the neck was normal [] : the neck was supple [Neck Cervical Mass (___cm)] : no neck mass was observed [Respiration, Rhythm And Depth] : normal respiratory rhythm and effort [Heart Rate And Rhythm] : heart rate was normal and rhythm regular [Auscultation Breath Sounds / Voice Sounds] : lungs were clear to auscultation bilaterally [Heart Sounds] : normal S1 and S2 [Examination Of The Chest] : the chest was normal in appearance [Abdomen Tenderness] : non-tender [Abdomen Soft] : soft [Cervical Lymph Nodes Enlarged Posterior Bilaterally] : posterior cervical [Cervical Lymph Nodes Enlarged Anterior Bilaterally] : anterior cervical [No CVA Tenderness] : no ~M costovertebral angle tenderness [Supraclavicular Lymph Nodes Enlarged Bilaterally] : supraclavicular [Abnormal Walk] : normal gait [Skin Color & Pigmentation] : normal skin color and pigmentation [No Focal Deficits] : no focal deficits [Oriented To Time, Place, And Person] : oriented to person, place, and time [Affect] : the affect was normal [Impaired Insight] : insight and judgment were intact

## 2019-07-27 NOTE — ASSESSMENT
[FreeTextEntry1] : Dejon patel is a 63 year old male who returns for follow up S/P right VATS, RUL wedge resection x1 on 7/15/13 for a T1aNx minimally invasive adenocarcinoma. Because of the final pathology results a redo right VATS with completion RULobectomy and MLND was done performed on 8/16/13. There was no evidence of disease at that time. \par \par Chest CT on 7/16/19:\par - RUL resection with stable postsurgical changes\par - Lungs otherwise clear\par - No enlarged mediastinal , hilar or axillary lymph nodes\par \par I have reviewed the patient's medical records and diagnostic images during the time of this office visit, and I have made the following recommendation: \par 1.  Follow up in 1 year with noncontrast Chest CT Scan.\par 2.  I have instructed him to restart his Breo Ellipta as directed and follow up with his pulmonologist.\par \par Written by Rosemary Goel NP, acting as a scribe for MADELINE BUCIO MD.\par \par The documentation recorded by the scribe accurately reflects the service I personally performed and the decisions made by me. MADELINE BUCIO MD\par

## 2019-07-27 NOTE — HISTORY OF PRESENT ILLNESS
[FreeTextEntry1] : Dejon Corbett is a 63 year old male who returns for follow up S/P right VATS, RUL wedge resection x1 on 7/15/13 for a T1aNx minimally invasive adenocarcinoma. Because of the final pathology results a redo right VATS with completion RULobectomy and MLND was done performed on 8/16/13. There was no evidence of disease at that time. \par \par Chest CT on 7/16/19:\par - RUL resection with stable postsurgical changes\par - Lungs otherwise clear\par - No enlarged mediastinal , hilar or axillary lymph nodes\par \par CT Scan of the Chest 7/25/2018: is stable with no evidence of disease.\par \par He complains of fatigue, SOB with exertion and a dry cough.  He reports that the cough is worse, but he stopped taking his Breo Ellipta and is only using his rescue inhaler.  He was instructed to restart his Breo Ellipta and follow up with his pulmonologist.  He denies any fever, chills, chest pain, hemoptysis, or recent illness.

## 2019-07-27 NOTE — REVIEW OF SYSTEMS
[Feeling Tired] : feeling tired [Wheezing] : wheezing [Cough] : cough [SOB on Exertion] : shortness of breath during exertion [Hesitancy] : urinary hesitancy [Nocturia] : nocturia [Negative] : Heme/Lymph [Fever] : no fever [Chills] : no chills [Recent Weight Loss (___ Lbs)] : no recent weight loss [Feeling Poorly] : not feeling poorly [Recent Weight Gain (___ Lbs)] : no recent weight gain [Palpitations] : no palpitations [Heart Rate Is Slow] : the heart rate was not slow [Chest Pain] : no chest pain [Heart Rate Is Fast] : the heart rate was not fast [Lower Ext Edema] : no extremity edema [Leg Claudication] : no intermittent leg claudication [Incontinence] : no incontinence [Dysuria] : no dysuria [de-identified] : numbness tingling to hands, tendonitis to hands  [FreeTextEntry8] : low grade prostate cancer [FreeTextEntry2] : reports chronic fatigue

## 2019-07-27 NOTE — CONSULT LETTER
[Dear  ___] : Dear  [unfilled], [Courtesy Letter:] : I had the pleasure of seeing your patient, [unfilled], in my office today. [Please see my note below.] : Please see my note below. [Sincerely,] : Sincerely, [FreeTextEntry3] : \par \par \par Rambo Osborne MD, FACS \par Chief, Division of Thoracic Surgery \par Director, Minimally Invasive Thoracic Surgery \par Department of Cardiovascular and Thoracic Surgery \par [facility] \par , Cardiovascular and Thoracic Surgery  [FreeTextEntry2] : Dr. Morales (Pulm/Ref)\par Dr. Bob Mcnamara (PCP/Card)

## 2019-08-16 ENCOUNTER — NON-APPOINTMENT (OUTPATIENT)
Age: 64
End: 2019-08-16

## 2019-08-16 ENCOUNTER — LABORATORY RESULT (OUTPATIENT)
Age: 64
End: 2019-08-16

## 2019-08-16 ENCOUNTER — MEDICATION RENEWAL (OUTPATIENT)
Age: 64
End: 2019-08-16

## 2019-08-16 ENCOUNTER — APPOINTMENT (OUTPATIENT)
Dept: CARDIOLOGY | Facility: CLINIC | Age: 64
End: 2019-08-16
Payer: MEDICARE

## 2019-08-16 ENCOUNTER — APPOINTMENT (OUTPATIENT)
Dept: PULMONOLOGY | Facility: CLINIC | Age: 64
End: 2019-08-16
Payer: MEDICARE

## 2019-08-16 VITALS
SYSTOLIC BLOOD PRESSURE: 160 MMHG | DIASTOLIC BLOOD PRESSURE: 90 MMHG | WEIGHT: 200 LBS | OXYGEN SATURATION: 97 % | HEART RATE: 65 BPM | TEMPERATURE: 98 F | BODY MASS INDEX: 32.14 KG/M2 | HEIGHT: 66 IN

## 2019-08-16 VITALS
OXYGEN SATURATION: 96 % | DIASTOLIC BLOOD PRESSURE: 80 MMHG | SYSTOLIC BLOOD PRESSURE: 187 MMHG | RESPIRATION RATE: 16 BRPM | HEART RATE: 55 BPM

## 2019-08-16 VITALS — DIASTOLIC BLOOD PRESSURE: 82 MMHG | SYSTOLIC BLOOD PRESSURE: 160 MMHG

## 2019-08-16 VITALS — SYSTOLIC BLOOD PRESSURE: 160 MMHG | DIASTOLIC BLOOD PRESSURE: 82 MMHG

## 2019-08-16 DIAGNOSIS — R51 HEADACHE: ICD-10-CM

## 2019-08-16 PROCEDURE — 94727 GAS DIL/WSHOT DETER LNG VOL: CPT

## 2019-08-16 PROCEDURE — 99214 OFFICE O/P EST MOD 30 MIN: CPT | Mod: 25

## 2019-08-16 PROCEDURE — 93306 TTE W/DOPPLER COMPLETE: CPT

## 2019-08-16 PROCEDURE — 94060 EVALUATION OF WHEEZING: CPT

## 2019-08-16 PROCEDURE — 99214 OFFICE O/P EST MOD 30 MIN: CPT

## 2019-08-16 PROCEDURE — 94729 DIFFUSING CAPACITY: CPT

## 2019-08-16 RX ORDER — ALBUTEROL SULFATE 90 UG/1
108 (90 BASE) AEROSOL, METERED RESPIRATORY (INHALATION)
Qty: 1 | Refills: 5 | Status: DISCONTINUED | COMMUNITY
Start: 2019-08-16 | End: 2019-08-16

## 2019-08-16 NOTE — PHYSICAL EXAM
[General Appearance - Well Developed] : well developed [Normal Appearance] : normal appearance [Well Groomed] : well groomed [General Appearance - Well Nourished] : well nourished [No Deformities] : no deformities [General Appearance - In No Acute Distress] : no acute distress [Normal Conjunctiva] : the conjunctiva exhibited no abnormalities [Eyelids - No Xanthelasma] : the eyelids demonstrated no xanthelasmas [Normal Oral Mucosa] : normal oral mucosa [No Oral Pallor] : no oral pallor [No Oral Cyanosis] : no oral cyanosis [Normal Jugular Venous A Waves Present] : normal jugular venous A waves present [Normal Jugular Venous V Waves Present] : normal jugular venous V waves present [No Jugular Venous Nichols A Waves] : no jugular venous nichols A waves [Respiration, Rhythm And Depth] : normal respiratory rhythm and effort [Exaggerated Use Of Accessory Muscles For Inspiration] : no accessory muscle use [Auscultation Breath Sounds / Voice Sounds] : lungs were clear to auscultation bilaterally [Heart Sounds] : normal S1 and S2 [Heart Rate And Rhythm] : heart rate and rhythm were normal [Murmurs] : no murmurs present [Arterial Pulses Normal] : the arterial pulses were normal [Veins - Varicosity Changes] : no varicosital changes were noted in the lower extremities [Edema] : no peripheral edema present [Abdomen Soft] : soft [Abdomen Tenderness] : non-tender [Abdomen Mass (___ Cm)] : no abdominal mass palpated [Nail Clubbing] : no clubbing of the fingernails [Abnormal Walk] : normal gait [Cyanosis, Localized] : no localized cyanosis [Petechial Hemorrhages (___cm)] : no petechial hemorrhages [Skin Color & Pigmentation] : normal skin color and pigmentation [] : no rash [No Venous Stasis] : no venous stasis [Skin Lesions] : no skin lesions [No Skin Ulcers] : no skin ulcer [No Xanthoma] : no  xanthoma was observed [Oriented To Time, Place, And Person] : oriented to person, place, and time [Affect] : the affect was normal [Mood] : the mood was normal [No Anxiety] : not feeling anxious

## 2019-08-16 NOTE — HISTORY OF PRESENT ILLNESS
[FreeTextEntry1] : This is 63 year old male with PMHx CAD, PTCA, HLD who presents for follow up. Patient is on ranexa for intermittent chest discomfort, reports that this has helped his chest discomfort a lot but he still feels occasional chest discomfort.  Patient complains of intermittent headaches that are relieved with tylenol.  Patient reports that in the last 2-3 months he has been feeling fatigued no matter how much sleep he gets he reports feeling tired.  He is busy during the day taking care of his grandchildren as well.\par He also reports a history of chronic back pain and reports that he has seen pain management and will be following up soon. \par \par Denies nausea, SOB, vomiting, dizziness, syncope, diaphoresis

## 2019-08-16 NOTE — ASSESSMENT
[FreeTextEntry1] : Urge compliance to CPAP gave new mask\par F/U 2 months\par Restart Breo\par PRN beta agonist\par Wt. loss

## 2019-08-16 NOTE — PHYSICAL EXAM
[General Appearance - Well Developed] : well developed [General Appearance - Well Nourished] : well nourished [Normal Conjunctiva] : the conjunctiva exhibited no abnormalities [Normal Oropharynx] : normal oropharynx [Heart Sounds] : normal S1 and S2 [Murmurs] : no murmurs present [Auscultation Breath Sounds / Voice Sounds] : lungs were clear to auscultation bilaterally [Lungs Percussion] : the lungs were normal to percussion [Abdomen Soft] : soft [] : no hepato-splenomegaly [Nail Clubbing] : no clubbing of the fingernails [Cyanosis, Localized] : no localized cyanosis

## 2019-08-16 NOTE — DISCUSSION/SUMMARY
[FreeTextEntry1] : Asthma with recc. of symptoms. Poorly compliant to bronchodilator therapy\par TAYA poorly compliant to CPAP\par HTN may be partly related to above\par H/A possible contribution related to TAYA as well as HTN\par Lung ca stable CT

## 2019-08-16 NOTE — PROCEDURE
[FreeTextEntry1] : Pulmonary function testing.\par These data demonstrate a mild-moderate obstructive ventilatory deficit. There is no significant bronchodilator response. There is elevation in the RV/TLC ratio indicative of possible air trapping. There is a mild-mod diffusion impairment Corrects to mild with lung volume correction

## 2019-08-18 ENCOUNTER — FORM ENCOUNTER (OUTPATIENT)
Age: 64
End: 2019-08-18

## 2019-08-19 ENCOUNTER — MEDICATION RENEWAL (OUTPATIENT)
Age: 64
End: 2019-08-19

## 2019-08-19 ENCOUNTER — OUTPATIENT (OUTPATIENT)
Dept: OUTPATIENT SERVICES | Facility: HOSPITAL | Age: 64
LOS: 1 days | End: 2019-08-19
Payer: MEDICARE

## 2019-08-19 ENCOUNTER — APPOINTMENT (OUTPATIENT)
Dept: MRI IMAGING | Facility: IMAGING CENTER | Age: 64
End: 2019-08-19
Payer: MEDICARE

## 2019-08-19 ENCOUNTER — APPOINTMENT (OUTPATIENT)
Dept: CARDIOLOGY | Facility: CLINIC | Age: 64
End: 2019-08-19
Payer: MEDICARE

## 2019-08-19 DIAGNOSIS — R51 HEADACHE: ICD-10-CM

## 2019-08-19 DIAGNOSIS — Z98.89 OTHER SPECIFIED POSTPROCEDURAL STATES: Chronic | ICD-10-CM

## 2019-08-19 PROCEDURE — 70553 MRI BRAIN STEM W/O & W/DYE: CPT | Mod: 26

## 2019-08-19 PROCEDURE — 70553 MRI BRAIN STEM W/O & W/DYE: CPT

## 2019-08-19 PROCEDURE — A9500: CPT

## 2019-08-19 PROCEDURE — 93015 CV STRESS TEST SUPVJ I&R: CPT

## 2019-08-19 PROCEDURE — A9585: CPT

## 2019-08-19 PROCEDURE — 78452 HT MUSCLE IMAGE SPECT MULT: CPT

## 2019-08-20 LAB
ALBUMIN SERPL ELPH-MCNC: 4.5 G/DL
ALP BLD-CCNC: 73 U/L
ALT SERPL-CCNC: 21 U/L
ANION GAP SERPL CALC-SCNC: 15 MMOL/L
AST SERPL-CCNC: 16 U/L
BASOPHILS # BLD AUTO: 0.04 K/UL
BASOPHILS NFR BLD AUTO: 0.5 %
BILIRUB SERPL-MCNC: 0.3 MG/DL
BUN SERPL-MCNC: 12 MG/DL
CALCIUM SERPL-MCNC: 9.4 MG/DL
CHLORIDE SERPL-SCNC: 103 MMOL/L
CHOLEST SERPL-MCNC: 161 MG/DL
CHOLEST/HDLC SERPL: 2.4 RATIO
CK SERPL-CCNC: 136 U/L
CO2 SERPL-SCNC: 26 MMOL/L
CREAT SERPL-MCNC: 1.15 MG/DL
EOSINOPHIL # BLD AUTO: 0.32 K/UL
EOSINOPHIL NFR BLD AUTO: 3.9 %
ESTIMATED AVERAGE GLUCOSE: 148 MG/DL
GLUCOSE SERPL-MCNC: 118 MG/DL
HBA1C MFR BLD HPLC: 6.8 %
HCT VFR BLD CALC: 39.1 %
HDLC SERPL-MCNC: 66 MG/DL
HGB BLD-MCNC: 11.6 G/DL
IMM GRANULOCYTES NFR BLD AUTO: 0.2 %
LDLC SERPL CALC-MCNC: 69 MG/DL
LDLC SERPL DIRECT ASSAY-MCNC: 80 MG/DL
LYMPHOCYTES # BLD AUTO: 3.4 K/UL
LYMPHOCYTES NFR BLD AUTO: 41.8 %
MAN DIFF?: NORMAL
MCHC RBC-ENTMCNC: 25.7 PG
MCHC RBC-ENTMCNC: 29.7 GM/DL
MCV RBC AUTO: 86.7 FL
MONOCYTES # BLD AUTO: 0.71 K/UL
MONOCYTES NFR BLD AUTO: 8.7 %
NEUTROPHILS # BLD AUTO: 3.64 K/UL
NEUTROPHILS NFR BLD AUTO: 44.9 %
PLATELET # BLD AUTO: 263 K/UL
POTASSIUM SERPL-SCNC: 4.3 MMOL/L
PROT SERPL-MCNC: 7 G/DL
RBC # BLD: 4.51 M/UL
RBC # FLD: 15 %
SODIUM SERPL-SCNC: 144 MMOL/L
T4 FREE SERPL-MCNC: 1.3 NG/DL
TRIGL SERPL-MCNC: 132 MG/DL
TSH SERPL-ACNC: 1.42 UIU/ML
WBC # FLD AUTO: 8.13 K/UL

## 2019-09-29 LAB
BASOPHILS # BLD AUTO: 0.05 K/UL
BASOPHILS NFR BLD AUTO: 0.6 %
EOSINOPHIL # BLD AUTO: 0.43 K/UL
EOSINOPHIL NFR BLD AUTO: 5 %
FERRITIN SERPL-MCNC: 125 NG/ML
FOLATE SERPL-MCNC: >20 NG/ML
HCT VFR BLD CALC: 40.1 %
HGB BLD-MCNC: 12 G/DL
IMM GRANULOCYTES NFR BLD AUTO: 0.2 %
IRON SATN MFR SERPL: 23 %
IRON SERPL-MCNC: 68 UG/DL
LYMPHOCYTES # BLD AUTO: 2.8 K/UL
LYMPHOCYTES NFR BLD AUTO: 32.8 %
MAN DIFF?: NORMAL
MCHC RBC-ENTMCNC: 26.4 PG
MCHC RBC-ENTMCNC: 29.9 GM/DL
MCV RBC AUTO: 88.1 FL
MONOCYTES # BLD AUTO: 0.88 K/UL
MONOCYTES NFR BLD AUTO: 10.3 %
NEUTROPHILS # BLD AUTO: 4.36 K/UL
NEUTROPHILS NFR BLD AUTO: 51.1 %
PLATELET # BLD AUTO: 265 K/UL
RBC # BLD: 4.55 M/UL
RBC # FLD: 15.5 %
TIBC SERPL-MCNC: 291 UG/DL
UIBC SERPL-MCNC: 223 UG/DL
VIT B12 SERPL-MCNC: 995 PG/ML
WBC # FLD AUTO: 8.54 K/UL

## 2019-10-17 ENCOUNTER — APPOINTMENT (OUTPATIENT)
Dept: ENDOCRINOLOGY | Facility: CLINIC | Age: 64
End: 2019-10-17

## 2019-10-17 ENCOUNTER — FORM ENCOUNTER (OUTPATIENT)
Age: 64
End: 2019-10-17

## 2019-10-18 ENCOUNTER — APPOINTMENT (OUTPATIENT)
Dept: PULMONOLOGY | Facility: CLINIC | Age: 64
End: 2019-10-18
Payer: MEDICARE

## 2019-10-18 VITALS
HEART RATE: 66 BPM | DIASTOLIC BLOOD PRESSURE: 75 MMHG | TEMPERATURE: 98 F | SYSTOLIC BLOOD PRESSURE: 120 MMHG | RESPIRATION RATE: 14 BRPM | OXYGEN SATURATION: 97 % | WEIGHT: 200 LBS | BODY MASS INDEX: 32.14 KG/M2 | HEIGHT: 66 IN

## 2019-10-18 PROCEDURE — G0008: CPT

## 2019-10-18 PROCEDURE — 95012 NITRIC OXIDE EXP GAS DETER: CPT

## 2019-10-18 PROCEDURE — 94060 EVALUATION OF WHEEZING: CPT

## 2019-10-18 PROCEDURE — 99213 OFFICE O/P EST LOW 20 MIN: CPT | Mod: 25

## 2019-10-18 PROCEDURE — 90674 CCIIV4 VAC NO PRSV 0.5 ML IM: CPT

## 2019-10-18 NOTE — HISTORY OF PRESENT ILLNESS
[FreeTextEntry1] : Now on breo does have some wheezing with stairs , Has mild dyspnea with stairs or if exerts self\par Does not feel improved on Breo and difficulty affording med. \par using cpap nightly less daytime sleepiness\par \par

## 2019-10-18 NOTE — PROCEDURE
[FreeTextEntry1] : 10/18/2019\par Pulmonary function testing\par These data demonstrate a moderate obstructive ventilatory deficit. There is no significant bronchodilator response. \par PFT attached relatively stable function.\par \par

## 2019-10-18 NOTE — ASSESSMENT
[FreeTextEntry1] : Patient compliant to CPAP therapy and having positive clinical response to treatment. Continue present settings.\par \par F/U 2 months\par stop breo \par \par singular daily \par PRN beta agonist\par Wt. loss/exercise

## 2019-10-18 NOTE — DISCUSSION/SUMMARY
[FreeTextEntry1] : Lung ca stable CT seeing Zeltsman next year \par TAYA on CPAP\par SOB related to airways disease, wt. and conditioning.

## 2019-11-11 DIAGNOSIS — Z11.1 ENCOUNTER FOR SCREENING FOR RESPIRATORY TUBERCULOSIS: ICD-10-CM

## 2019-12-05 NOTE — ED PROVIDER NOTE - SKIN NEGATIVE STATEMENT, MLM
RN spoke to the daughter Emely Reyna and recommended that they contact a pulmonologist.      Encouraged her to keep taking the pulse ox and encourage breathing in through her nose and out through the mouth.       Daughter agreed and and was going to contact the p no abrasions, no jaundice, no lesions, no pruritis, and no rashes.

## 2020-02-07 ENCOUNTER — APPOINTMENT (OUTPATIENT)
Dept: PULMONOLOGY | Facility: CLINIC | Age: 65
End: 2020-02-07
Payer: MEDICARE

## 2020-02-07 VITALS
OXYGEN SATURATION: 97 % | SYSTOLIC BLOOD PRESSURE: 140 MMHG | HEIGHT: 66 IN | BODY MASS INDEX: 32.14 KG/M2 | DIASTOLIC BLOOD PRESSURE: 72 MMHG | RESPIRATION RATE: 17 BRPM | WEIGHT: 200 LBS | HEART RATE: 71 BPM

## 2020-02-07 PROCEDURE — 94729 DIFFUSING CAPACITY: CPT

## 2020-02-07 PROCEDURE — 94727 GAS DIL/WSHOT DETER LNG VOL: CPT

## 2020-02-07 PROCEDURE — 99214 OFFICE O/P EST MOD 30 MIN: CPT | Mod: 25

## 2020-02-07 RX ORDER — FLUTICASONE FUROATE AND VILANTEROL TRIFENATATE 200; 25 UG/1; UG/1
200-25 POWDER RESPIRATORY (INHALATION) DAILY
Qty: 1 | Refills: 5 | Status: DISCONTINUED | COMMUNITY
Start: 2019-08-16 | End: 2020-02-07

## 2020-02-07 NOTE — HISTORY OF PRESENT ILLNESS
[Former] : former [TextBox_11] : 1-1/2 [TextBox_4] : has nasal congestion from uri in december and broke nose last year.\par has been off breo and rare Ventolin use\par Compliance to CPAP not good\par \par No significant cough, wheezing, chest pain or SOB.\par  [TextBox_13] : 39 [TextBox_15] : 2013

## 2020-02-07 NOTE — PROCEDURE
[FreeTextEntry1] : 02/07/2020\par Pulmonary function testing\par These data demonstrate a mild-moderate obstructive ventilatory deficit. There is no significant bronchodilator response. There is elevation in the RV/TLC ratio indicative of possible air trapping. There is a mild diffusion impairment. Corrects to normal with lung volume correction \par PFT attached relatively stable function.\par \par \par

## 2020-02-07 NOTE — DISCUSSION/SUMMARY
[FreeTextEntry1] : Lung ca stable CT seeing Zeltsman next year Followed with CT\par TAYA on CPAP change to full face mask. Reviewed\par COPD stable. No sign. change off bronchodilator therapy. No exacerbations\par

## 2020-02-07 NOTE — PHYSICAL EXAM
[General Appearance - Well Developed] : well developed [General Appearance - Well Nourished] : well nourished [Normal Conjunctiva] : the conjunctiva exhibited no abnormalities [Normal Oropharynx] : normal oropharynx [Heart Sounds] : normal S1 and S2 [Murmurs] : no murmurs present [Lungs Percussion] : the lungs were normal to percussion [Auscultation Breath Sounds / Voice Sounds] : lungs were clear to auscultation bilaterally [Abdomen Soft] : soft [] : no hepato-splenomegaly [Nail Clubbing] : no clubbing of the fingernails [Cyanosis, Localized] : no localized cyanosis

## 2020-02-07 NOTE — ASSESSMENT
[FreeTextEntry1] : Restart  CPAP regular use\par singular daily \par PRN beta agonist\par Wt. loss/exercise

## 2020-02-10 ENCOUNTER — LABORATORY RESULT (OUTPATIENT)
Age: 65
End: 2020-02-10

## 2020-02-10 ENCOUNTER — APPOINTMENT (OUTPATIENT)
Dept: CARDIOLOGY | Facility: CLINIC | Age: 65
End: 2020-02-10
Payer: MEDICARE

## 2020-02-10 ENCOUNTER — NON-APPOINTMENT (OUTPATIENT)
Age: 65
End: 2020-02-10

## 2020-02-10 ENCOUNTER — APPOINTMENT (OUTPATIENT)
Dept: ENDOCRINOLOGY | Facility: CLINIC | Age: 65
End: 2020-02-10
Payer: MEDICARE

## 2020-02-10 VITALS
BODY MASS INDEX: 30.31 KG/M2 | WEIGHT: 200 LBS | SYSTOLIC BLOOD PRESSURE: 140 MMHG | HEART RATE: 61 BPM | DIASTOLIC BLOOD PRESSURE: 80 MMHG | OXYGEN SATURATION: 98 % | HEIGHT: 68 IN

## 2020-02-10 VITALS
SYSTOLIC BLOOD PRESSURE: 140 MMHG | OXYGEN SATURATION: 98 % | DIASTOLIC BLOOD PRESSURE: 80 MMHG | BODY MASS INDEX: 32.14 KG/M2 | TEMPERATURE: 97.7 F | HEART RATE: 61 BPM | WEIGHT: 200 LBS | HEIGHT: 66 IN

## 2020-02-10 DIAGNOSIS — Z00.00 ENCOUNTER FOR GENERAL ADULT MEDICAL EXAMINATION W/OUT ABNORMAL FINDINGS: ICD-10-CM

## 2020-02-10 DIAGNOSIS — F41.9 ANXIETY DISORDER, UNSPECIFIED: ICD-10-CM

## 2020-02-10 PROCEDURE — 99214 OFFICE O/P EST MOD 30 MIN: CPT

## 2020-02-10 PROCEDURE — 93000 ELECTROCARDIOGRAM COMPLETE: CPT

## 2020-02-10 PROCEDURE — 83036 HEMOGLOBIN GLYCOSYLATED A1C: CPT | Mod: QW

## 2020-02-10 PROCEDURE — 82962 GLUCOSE BLOOD TEST: CPT

## 2020-02-10 NOTE — PHYSICAL EXAM
[General Appearance - Well Developed] : well developed [Normal Appearance] : normal appearance [Well Groomed] : well groomed [General Appearance - Well Nourished] : well nourished [No Deformities] : no deformities [General Appearance - In No Acute Distress] : no acute distress [Normal Conjunctiva] : the conjunctiva exhibited no abnormalities [Normal Oral Mucosa] : normal oral mucosa [Eyelids - No Xanthelasma] : the eyelids demonstrated no xanthelasmas [No Oral Pallor] : no oral pallor [No Oral Cyanosis] : no oral cyanosis [No Jugular Venous Nichols A Waves] : no jugular venous nichols A waves [Normal Jugular Venous V Waves Present] : normal jugular venous V waves present [Normal Jugular Venous A Waves Present] : normal jugular venous A waves present [Respiration, Rhythm And Depth] : normal respiratory rhythm and effort [Exaggerated Use Of Accessory Muscles For Inspiration] : no accessory muscle use [Auscultation Breath Sounds / Voice Sounds] : lungs were clear to auscultation bilaterally [Heart Sounds] : normal S1 and S2 [Murmurs] : no murmurs present [Heart Rate And Rhythm] : heart rate and rhythm were normal [Abdomen Tenderness] : non-tender [Abdomen Soft] : soft [Abdomen Mass (___ Cm)] : no abdominal mass palpated [FreeTextEntry1] : reproducible pain back area  [Cyanosis, Localized] : no localized cyanosis [Nail Clubbing] : no clubbing of the fingernails [Petechial Hemorrhages (___cm)] : no petechial hemorrhages [Skin Color & Pigmentation] : normal skin color and pigmentation [] : no rash [No Venous Stasis] : no venous stasis [Skin Lesions] : no skin lesions [No Xanthoma] : no  xanthoma was observed [No Skin Ulcers] : no skin ulcer [Oriented To Time, Place, And Person] : oriented to person, place, and time [Affect] : the affect was normal [No Anxiety] : not feeling anxious [Mood] : the mood was normal

## 2020-02-10 NOTE — HISTORY OF PRESENT ILLNESS
[FreeTextEntry1] : pt presents for f/u pt feels well pt with persistent back issues .pt requests referral for access a ride pt denies any chest  pain dizziness ,lightheadedness ,nausea vomiting diaphoresis\par pt with ocassional back issues with sever epain  pt with ocassional anxiety requests refill on xanax

## 2020-02-17 LAB
GLUCOSE BLDC GLUCOMTR-MCNC: 105
HBA1C MFR BLD HPLC: 6.4

## 2020-02-26 ENCOUNTER — APPOINTMENT (OUTPATIENT)
Dept: CARDIOLOGY | Facility: CLINIC | Age: 65
End: 2020-02-26

## 2020-02-29 ENCOUNTER — EMERGENCY (EMERGENCY)
Facility: HOSPITAL | Age: 65
LOS: 1 days | Discharge: HOME | End: 2020-02-29
Attending: EMERGENCY MEDICINE | Admitting: EMERGENCY MEDICINE
Payer: MEDICARE

## 2020-02-29 VITALS
SYSTOLIC BLOOD PRESSURE: 159 MMHG | HEIGHT: 66 IN | TEMPERATURE: 102 F | HEART RATE: 86 BPM | DIASTOLIC BLOOD PRESSURE: 75 MMHG | WEIGHT: 199.96 LBS | OXYGEN SATURATION: 99 % | RESPIRATION RATE: 18 BRPM

## 2020-02-29 VITALS
SYSTOLIC BLOOD PRESSURE: 153 MMHG | TEMPERATURE: 100 F | DIASTOLIC BLOOD PRESSURE: 73 MMHG | OXYGEN SATURATION: 99 % | RESPIRATION RATE: 18 BRPM | HEART RATE: 94 BPM

## 2020-02-29 DIAGNOSIS — Z98.89 OTHER SPECIFIED POSTPROCEDURAL STATES: Chronic | ICD-10-CM

## 2020-02-29 LAB
ALBUMIN SERPL ELPH-MCNC: 4.2 G/DL — SIGNIFICANT CHANGE UP (ref 3.3–5)
ALP SERPL-CCNC: 74 U/L — SIGNIFICANT CHANGE UP (ref 40–120)
ALT FLD-CCNC: 32 U/L — SIGNIFICANT CHANGE UP (ref 4–41)
ANION GAP SERPL CALC-SCNC: 15 MMO/L — HIGH (ref 7–14)
AST SERPL-CCNC: 25 U/L — SIGNIFICANT CHANGE UP (ref 4–40)
BASE EXCESS BLDV CALC-SCNC: 0.6 MMOL/L — SIGNIFICANT CHANGE UP
BASOPHILS # BLD AUTO: 0.01 K/UL — SIGNIFICANT CHANGE UP (ref 0–0.2)
BASOPHILS NFR BLD AUTO: 0.1 % — SIGNIFICANT CHANGE UP (ref 0–2)
BILIRUB SERPL-MCNC: 0.4 MG/DL — SIGNIFICANT CHANGE UP (ref 0.2–1.2)
BLOOD GAS VENOUS - CREATININE: 1.14 MG/DL — SIGNIFICANT CHANGE UP (ref 0.5–1.3)
BUN SERPL-MCNC: 12 MG/DL — SIGNIFICANT CHANGE UP (ref 7–23)
CALCIUM SERPL-MCNC: 9.2 MG/DL — SIGNIFICANT CHANGE UP (ref 8.4–10.5)
CHLORIDE BLDV-SCNC: 104 MMOL/L — SIGNIFICANT CHANGE UP (ref 96–108)
CHLORIDE SERPL-SCNC: 98 MMOL/L — SIGNIFICANT CHANGE UP (ref 98–107)
CO2 SERPL-SCNC: 23 MMOL/L — SIGNIFICANT CHANGE UP (ref 22–31)
CREAT SERPL-MCNC: 1.1 MG/DL — SIGNIFICANT CHANGE UP (ref 0.5–1.3)
EOSINOPHIL # BLD AUTO: 0.01 K/UL — SIGNIFICANT CHANGE UP (ref 0–0.5)
EOSINOPHIL NFR BLD AUTO: 0.1 % — SIGNIFICANT CHANGE UP (ref 0–6)
FLU A RESULT: NOT DETECTED — SIGNIFICANT CHANGE UP
FLU A RESULT: NOT DETECTED — SIGNIFICANT CHANGE UP
FLUAV AG NPH QL: NOT DETECTED — SIGNIFICANT CHANGE UP
FLUBV AG NPH QL: DETECTED — HIGH
GAS PNL BLDV: 136 MMOL/L — SIGNIFICANT CHANGE UP (ref 136–146)
GLUCOSE BLDV-MCNC: 134 MG/DL — HIGH (ref 70–99)
GLUCOSE SERPL-MCNC: 140 MG/DL — HIGH (ref 70–99)
HCO3 BLDV-SCNC: 25 MMOL/L — SIGNIFICANT CHANGE UP (ref 20–27)
HCT VFR BLD CALC: 39.1 % — SIGNIFICANT CHANGE UP (ref 39–50)
HCT VFR BLDV CALC: 38.3 % — LOW (ref 39–51)
HGB BLD-MCNC: 12.3 G/DL — LOW (ref 13–17)
HGB BLDV-MCNC: 12.4 G/DL — LOW (ref 13–17)
IMM GRANULOCYTES NFR BLD AUTO: 0.2 % — SIGNIFICANT CHANGE UP (ref 0–1.5)
LACTATE BLDV-MCNC: 2.6 MMOL/L — HIGH (ref 0.5–2)
LYMPHOCYTES # BLD AUTO: 1.29 K/UL — SIGNIFICANT CHANGE UP (ref 1–3.3)
LYMPHOCYTES # BLD AUTO: 14.8 % — SIGNIFICANT CHANGE UP (ref 13–44)
MCHC RBC-ENTMCNC: 26.7 PG — LOW (ref 27–34)
MCHC RBC-ENTMCNC: 31.5 % — LOW (ref 32–36)
MCV RBC AUTO: 85 FL — SIGNIFICANT CHANGE UP (ref 80–100)
MONOCYTES # BLD AUTO: 1.16 K/UL — HIGH (ref 0–0.9)
MONOCYTES NFR BLD AUTO: 13.3 % — SIGNIFICANT CHANGE UP (ref 2–14)
NEUTROPHILS # BLD AUTO: 6.23 K/UL — SIGNIFICANT CHANGE UP (ref 1.8–7.4)
NEUTROPHILS NFR BLD AUTO: 71.5 % — SIGNIFICANT CHANGE UP (ref 43–77)
NRBC # FLD: 0 K/UL — SIGNIFICANT CHANGE UP (ref 0–0)
PCO2 BLDV: 40 MMHG — LOW (ref 41–51)
PH BLDV: 7.41 PH — SIGNIFICANT CHANGE UP (ref 7.32–7.43)
PLATELET # BLD AUTO: 220 K/UL — SIGNIFICANT CHANGE UP (ref 150–400)
PMV BLD: 10.4 FL — SIGNIFICANT CHANGE UP (ref 7–13)
PO2 BLDV: 49 MMHG — HIGH (ref 35–40)
POTASSIUM BLDV-SCNC: 3 MMOL/L — LOW (ref 3.4–4.5)
POTASSIUM SERPL-MCNC: 3.4 MMOL/L — LOW (ref 3.5–5.3)
POTASSIUM SERPL-SCNC: 3.4 MMOL/L — LOW (ref 3.5–5.3)
PROT SERPL-MCNC: 7.5 G/DL — SIGNIFICANT CHANGE UP (ref 6–8.3)
RBC # BLD: 4.6 M/UL — SIGNIFICANT CHANGE UP (ref 4.2–5.8)
RBC # FLD: 14.6 % — HIGH (ref 10.3–14.5)
RSV RESULT: SIGNIFICANT CHANGE UP
RSV RNA RESP QL NAA+PROBE: SIGNIFICANT CHANGE UP
SAO2 % BLDV: 82.6 % — SIGNIFICANT CHANGE UP (ref 60–85)
SODIUM SERPL-SCNC: 136 MMOL/L — SIGNIFICANT CHANGE UP (ref 135–145)
WBC # BLD: 8.72 K/UL — SIGNIFICANT CHANGE UP (ref 3.8–10.5)
WBC # FLD AUTO: 8.72 K/UL — SIGNIFICANT CHANGE UP (ref 3.8–10.5)

## 2020-02-29 PROCEDURE — 71046 X-RAY EXAM CHEST 2 VIEWS: CPT | Mod: 26

## 2020-02-29 PROCEDURE — 99284 EMERGENCY DEPT VISIT MOD MDM: CPT | Mod: GC

## 2020-02-29 RX ORDER — AZITHROMYCIN 500 MG/1
500 TABLET, FILM COATED ORAL ONCE
Refills: 0 | Status: COMPLETED | OUTPATIENT
Start: 2020-02-29 | End: 2020-02-29

## 2020-02-29 RX ORDER — SODIUM CHLORIDE 9 MG/ML
1000 INJECTION INTRAMUSCULAR; INTRAVENOUS; SUBCUTANEOUS ONCE
Refills: 0 | Status: COMPLETED | OUTPATIENT
Start: 2020-02-29 | End: 2020-02-29

## 2020-02-29 RX ORDER — ACETAMINOPHEN 500 MG
975 TABLET ORAL ONCE
Refills: 0 | Status: COMPLETED | OUTPATIENT
Start: 2020-02-29 | End: 2020-02-29

## 2020-02-29 RX ORDER — CEFTRIAXONE 500 MG/1
1000 INJECTION, POWDER, FOR SOLUTION INTRAMUSCULAR; INTRAVENOUS ONCE
Refills: 0 | Status: COMPLETED | OUTPATIENT
Start: 2020-02-29 | End: 2020-02-29

## 2020-02-29 RX ADMIN — Medication 975 MILLIGRAM(S): at 19:34

## 2020-02-29 RX ADMIN — SODIUM CHLORIDE 1000 MILLILITER(S): 9 INJECTION INTRAMUSCULAR; INTRAVENOUS; SUBCUTANEOUS at 21:20

## 2020-02-29 RX ADMIN — Medication 75 MILLIGRAM(S): at 21:31

## 2020-02-29 RX ADMIN — Medication 975 MILLIGRAM(S): at 18:45

## 2020-02-29 RX ADMIN — SODIUM CHLORIDE 1000 MILLILITER(S): 9 INJECTION INTRAMUSCULAR; INTRAVENOUS; SUBCUTANEOUS at 18:43

## 2020-02-29 RX ADMIN — AZITHROMYCIN 255 MILLIGRAM(S): 500 TABLET, FILM COATED ORAL at 20:04

## 2020-02-29 RX ADMIN — CEFTRIAXONE 100 MILLIGRAM(S): 500 INJECTION, POWDER, FOR SOLUTION INTRAMUSCULAR; INTRAVENOUS at 19:34

## 2020-02-29 NOTE — ED ADULT NURSE NOTE - OBJECTIVE STATEMENT
Pt a&ox3 h/o lung cancer in remission and h/o prostate ca, c/o fevers, headache, bodyaches for the past 5 days, breathing even and unlabored, febrile orally, ivl placed, labs sent, medicated as ordered, nsr on monitor, md at bedside, will continue to monitor.

## 2020-02-29 NOTE — HISTORY OF PRESENT ILLNESS
[FreeTextEntry1] : Mr. WALLS  is a 64 year  old  male who  returns today in follow up with regard to a history of type 2 diabetes mellitus. The diabetes mellitus hs been present for ? number of years as he had been calling this pre dm but at this office  A1c was 6.7 in April of 2019 and 6.8 in august of 2019.  There is no known history of retinopathy, nephropathy. He  too denies any history of neuropathy. On no medication for the dm. HGM of late  has not been tested. .There has been no significant hypoglycemia.  denies any chest pain, sob, neurologic or ophthalmologic complaints. He  too denies any new podiatric concerns. He  is up to date with his ophthalmologic visit. Notes some itching and some numbness in hands-bialt. UTD with  with optho-no retinopathy.\par Additional medical history includes that of Prostate Ca dx 8/14-low grade Too with cad hx 9 stents 2 more placed last February, hyperlipidemia, Minor mi, htn and COPD and hx of adenocarcinoma lung 2013 s/p rt upper lobectomy.\par \par \par

## 2020-02-29 NOTE — ED PROVIDER NOTE - PROGRESS NOTE DETAILS
Kusum Padron MD PGY-2 pt signed out to me pending xray, flu swab, rpt vbg and abx administration. will reeval for dispo Kusum Padron MD PGY-2 pt found to be flu positive. given underlying lung comorbidities will give tamiflu and d/c on tamiflu. patient states he feels improved. will instruct to follow up with pcp

## 2020-02-29 NOTE — ED PROVIDER NOTE - ATTENDING CONTRIBUTION TO CARE
I performed a face-to-face evaluation of the patient and performed a history and physical examination. I agree with the history and physical examination.    Lung cancer s/p R lobectomy, p/w F, myalgias, malaise. Failed OTC meds. No CP at rest. Lungs clear. Check for flu; swab, CXR. Labs.

## 2020-02-29 NOTE — ED PROVIDER NOTE - OBJECTIVE STATEMENT
63yo woman PMH CAD s/p stent on plavix and asa, HTN, HLD, arthritis, lung cancer s/p right upper lobectomy, prostate cancer with monitoring presents with fever, dry cough, runny nose and generalized muscle aches. He states that he has chest pain when coughing but no chest pain at rest. 63yo woman PMH CAD s/p stent on plavix and asa, HTN, HLD, arthritis, lung cancer s/p right upper lobectomy, prostate cancer with monitoring presents with fever, dry cough, runny nose and generalized muscle aches. He states that he has chest pain when coughing but no chest pain at rest and feels short of breath. He denies recent travel. Pt states his daughter and granddaughter have similar symptoms. He denies leg swelling or pain. No new rash. He has tried to take aye seltzer plus with minimal relief and took tylenol at 10am this morning.

## 2020-02-29 NOTE — ED PROVIDER NOTE - NSFOLLOWUPINSTRUCTIONS_ED_ALL_ED_FT
1. You were seen in the Emergency Room for Influenza B  2. You may take ACETAMINOPHEN as directed on packaging. Always follow medication instructions and read medication side effects. Stop using these medications if you have any concerns   3. You should also take Tamiflu 75 mg two times a day for 7 days.   4. Please follow up with your doctor in 24-48 hours.  5. Return to the emergency room or seek immediate assistance for any new or concerning symptoms (such as fevers, chills, chest pain, shortness of breath, worsening cough ), or if you get worse.   6. Copies of your tests were provided to you for follow-up.  You must address all your findings with your doctor.

## 2020-02-29 NOTE — ED ADULT TRIAGE NOTE - CHIEF COMPLAINT QUOTE
Flu like symptoms since Tuesday   PMH: HTN, high, lung cancer, prostate cancer (2014) lobectomy 2013 currently in remission Flu like symptoms since Tuesday with fever body aches, congestion   PMH: HTN, high, lung cancer, prostate cancer (2014) lobectomy 2013 currently in remission

## 2020-02-29 NOTE — ED PROVIDER NOTE - PATIENT PORTAL LINK FT
You can access the FollowMyHealth Patient Portal offered by [facility] by registering at the following website: [email or web address]. By joining [facility]’s FollowMyHealth portal, you will also be able to view your health information using other applications (apps) compatible with our system.

## 2020-02-29 NOTE — PHYSICAL EXAM
[Alert] : alert [Well Nourished] : well nourished [No Acute Distress] : no acute distress [Well Developed] : well developed [Normal Sclera/Conjunctiva] : normal sclera/conjunctiva [EOMI] : extra ocular movement intact [Normal Oropharynx] : the oropharynx was normal [No Proptosis] : no proptosis [No Thyroid Nodules] : there were no palpable thyroid nodules [Thyroid Not Enlarged] : the thyroid was not enlarged [No Respiratory Distress] : no respiratory distress [No Accessory Muscle Use] : no accessory muscle use [Normal Rate] : heart rate was normal  [Clear to Auscultation] : lungs were clear to auscultation bilaterally [Normal S1, S2] : normal S1 and S2 [Regular Rhythm] : with a regular rhythm [Pedal Pulses Normal] : the pedal pulses are present [No Edema] : there was no peripheral edema [Normal Bowel Sounds] : normal bowel sounds [Not Tender] : non-tender [Soft] : abdomen soft [Not Distended] : not distended [Post Cervical Nodes] : posterior cervical nodes [Axillary Nodes] : axillary nodes [Anterior Cervical Nodes] : anterior cervical nodes [Normal] : normal and non tender [No Spinal Tenderness] : no spinal tenderness [Spine Straight] : spine straight [No Stigmata of Cushings Syndrome] : no stigmata of cushings syndrome [No Rash] : no rash [Normal Gait] : normal gait [Normal Strength/Tone] : muscle strength and tone were normal [Normal Reflexes] : deep tendon reflexes were 2+ and symmetric [No Tremors] : no tremors [Oriented x3] : oriented to person, place, and time [Acanthosis Nigricans] : no acanthosis nigricans

## 2020-02-29 NOTE — ED PROVIDER NOTE - CLINICAL SUMMARY MEDICAL DECISION MAKING FREE TEXT BOX
65yo man PMH lung cancer s/p lobectomy, prostate cancer, HTN, HLD, CAD s/p stent on plavix and aspirin p/w fever, cough, generalized malaise with associated shortness of breath and no chest pain. Pt is febrile on exam and symptoms likely to infectious etiology such as viral/flu vs PNA, low suspicion for acs or PE. Will evaluate with ecg, cxr, cbc, cmp, vbg. Will give fluids and tylenol for fever and reassess.

## 2020-02-29 NOTE — ED ADULT NURSE NOTE - CHIEF COMPLAINT QUOTE
Flu like symptoms since Tuesday with fever body aches, congestion   PMH: HTN, high, lung cancer, prostate cancer (2014) lobectomy 2013 currently in remission

## 2020-02-29 NOTE — ED PROVIDER NOTE - PHYSICAL EXAMINATION
General: tired appearing elderly man in no acute distress  Head: normocephalic, atraumatic  Eyes: PERRL, no conjunctival injection  Mouth: moist mucous membranes, no oropharyngeal erythema or exudates  Neck: supple neck, no lymphadenopathy, full ROM  CV: normal rate and rhythm, no LE edema or tenderness to palpation, peripheral pulses 2+ bilaterally  Respiratory: clear to auscultation bilaterally  Abdomen: soft, nontender, nondistended  : no suprapubic tenderness, no CVAT  Neuro: alert and oriented x3, speech clear, strength 5/5 UE and LE bilaterally, sensation equal and intact bilaterally  Skin: no rash on chest  Extremities: full ROM, no tenderness to palpation of joints

## 2020-03-01 LAB
SPECIMEN SOURCE: SIGNIFICANT CHANGE UP
SPECIMEN SOURCE: SIGNIFICANT CHANGE UP

## 2020-03-01 NOTE — ED POST DISCHARGE NOTE - REASON FOR FOLLOW-UP
Other Patient called was supposed to have Tamiflu ERXD to Patient's preferred pharmacy. No Rx at pharmacy. ERXD Tamiflu 75mg BID X 5 days. Confirmed with Dr Sharpe that was supposed to ERX Tamiflu.

## 2020-03-05 LAB
BACTERIA BLD CULT: SIGNIFICANT CHANGE UP
BACTERIA BLD CULT: SIGNIFICANT CHANGE UP

## 2020-03-11 ENCOUNTER — FORM ENCOUNTER (OUTPATIENT)
Age: 65
End: 2020-03-11

## 2020-03-12 ENCOUNTER — APPOINTMENT (OUTPATIENT)
Dept: MRI IMAGING | Facility: IMAGING CENTER | Age: 65
End: 2020-03-12
Payer: MEDICARE

## 2020-03-12 ENCOUNTER — OUTPATIENT (OUTPATIENT)
Dept: OUTPATIENT SERVICES | Facility: HOSPITAL | Age: 65
LOS: 1 days | End: 2020-03-12
Payer: MEDICARE

## 2020-03-12 DIAGNOSIS — Z98.89 OTHER SPECIFIED POSTPROCEDURAL STATES: Chronic | ICD-10-CM

## 2020-03-12 DIAGNOSIS — M54.9 DORSALGIA, UNSPECIFIED: ICD-10-CM

## 2020-03-12 PROCEDURE — 72141 MRI NECK SPINE W/O DYE: CPT | Mod: 26

## 2020-03-12 PROCEDURE — 72148 MRI LUMBAR SPINE W/O DYE: CPT | Mod: 26

## 2020-03-12 PROCEDURE — 72148 MRI LUMBAR SPINE W/O DYE: CPT

## 2020-03-12 PROCEDURE — 72141 MRI NECK SPINE W/O DYE: CPT

## 2020-04-02 DIAGNOSIS — Z87.09 PERSONAL HISTORY OF OTHER DISEASES OF THE RESPIRATORY SYSTEM: ICD-10-CM

## 2020-04-12 LAB
ALBUMIN SERPL ELPH-MCNC: 4.5 G/DL
ALP BLD-CCNC: 83 U/L
ALT SERPL-CCNC: 28 U/L
ANION GAP SERPL CALC-SCNC: 17 MMOL/L
APPEARANCE: CLEAR
AST SERPL-CCNC: 19 U/L
BACTERIA: NEGATIVE
BASOPHILS # BLD AUTO: 0.04 K/UL
BASOPHILS NFR BLD AUTO: 0.5 %
BILIRUB SERPL-MCNC: 0.3 MG/DL
BILIRUBIN URINE: NEGATIVE
BLOOD URINE: NEGATIVE
BUN SERPL-MCNC: 13 MG/DL
CALCIUM SERPL-MCNC: 9.8 MG/DL
CHLORIDE SERPL-SCNC: 104 MMOL/L
CHOLEST SERPL-MCNC: 182 MG/DL
CHOLEST/HDLC SERPL: 2.7 RATIO
CK SERPL-CCNC: 183 U/L
CO2 SERPL-SCNC: 21 MMOL/L
COLOR: YELLOW
CREAT SERPL-MCNC: 1.24 MG/DL
EOSINOPHIL # BLD AUTO: 0.37 K/UL
EOSINOPHIL NFR BLD AUTO: 4.5 %
ESTIMATED AVERAGE GLUCOSE: 137 MG/DL
FERRITIN SERPL-MCNC: 178 NG/ML
FOLATE SERPL-MCNC: >20 NG/ML
GLUCOSE QUALITATIVE U: NEGATIVE
GLUCOSE SERPL-MCNC: 106 MG/DL
HAPTOGLOB SERPL-MCNC: 319 MG/DL
HBA1C MFR BLD HPLC: 6.4 %
HCT VFR BLD CALC: 39.6 %
HDLC SERPL-MCNC: 68 MG/DL
HGB BLD-MCNC: 12.2 G/DL
HYALINE CASTS: 0 /LPF
IMM GRANULOCYTES NFR BLD AUTO: 0.1 %
IRON SERPL-MCNC: 67 UG/DL
KETONES URINE: NEGATIVE
LDH SERPL-CCNC: 147 U/L
LDLC SERPL CALC-MCNC: 81 MG/DL
LDLC SERPL DIRECT ASSAY-MCNC: 89 MG/DL
LEUKOCYTE ESTERASE URINE: NEGATIVE
LYMPHOCYTES # BLD AUTO: 3.19 K/UL
LYMPHOCYTES NFR BLD AUTO: 38.4 %
M TB IFN-G BLD-IMP: NEGATIVE
MAN DIFF?: NORMAL
MCHC RBC-ENTMCNC: 26.7 PG
MCHC RBC-ENTMCNC: 30.8 GM/DL
MCV RBC AUTO: 86.7 FL
MEV IGG FLD QL IA: >300 AU/ML
MEV IGG+IGM SER-IMP: POSITIVE
MICROSCOPIC-UA: NORMAL
MONOCYTES # BLD AUTO: 0.85 K/UL
MONOCYTES NFR BLD AUTO: 10.2 %
MUV AB SER-ACNC: POSITIVE
MUV IGG SER QL IA: 289 AU/ML
NEUTROPHILS # BLD AUTO: 3.84 K/UL
NEUTROPHILS NFR BLD AUTO: 46.3 %
NITRITE URINE: NEGATIVE
PH URINE: 6.5
PLATELET # BLD AUTO: 269 K/UL
POTASSIUM SERPL-SCNC: 4.2 MMOL/L
PROT SERPL-MCNC: 7.1 G/DL
PROTEIN URINE: NORMAL
QUANTIFERON TB PLUS MITOGEN MINUS NIL: 8.47 IU/ML
QUANTIFERON TB PLUS NIL: 0.06 IU/ML
QUANTIFERON TB PLUS TB1 MINUS NIL: -0.02 IU/ML
QUANTIFERON TB PLUS TB2 MINUS NIL: -0.01 IU/ML
RBC # BLD: 4.57 M/UL
RBC # FLD: 14.9 %
RED BLOOD CELLS URINE: 2 /HPF
RUBV IGG FLD-ACNC: 21.8 INDEX
RUBV IGG SER-IMP: POSITIVE
SODIUM SERPL-SCNC: 143 MMOL/L
SPECIFIC GRAVITY URINE: 1.02
SQUAMOUS EPITHELIAL CELLS: 0 /HPF
TRANSFERRIN SERPL-MCNC: 232 MG/DL
TRIGL SERPL-MCNC: 164 MG/DL
TSH SERPL-ACNC: 1.5 UIU/ML
UROBILINOGEN URINE: NORMAL
VIT B12 SERPL-MCNC: 1558 PG/ML
VZV AB TITR SER: POSITIVE
VZV IGG SER IF-ACNC: >4000 INDEX
WBC # FLD AUTO: 8.3 K/UL
WHITE BLOOD CELLS URINE: 1 /HPF

## 2020-05-03 ENCOUNTER — RX RENEWAL (OUTPATIENT)
Age: 65
End: 2020-05-03

## 2020-07-01 ENCOUNTER — APPOINTMENT (OUTPATIENT)
Dept: CT IMAGING | Facility: IMAGING CENTER | Age: 65
End: 2020-07-01
Payer: MEDICARE

## 2020-07-01 ENCOUNTER — OUTPATIENT (OUTPATIENT)
Dept: OUTPATIENT SERVICES | Facility: HOSPITAL | Age: 65
LOS: 1 days | End: 2020-07-01
Payer: MEDICARE

## 2020-07-01 ENCOUNTER — RESULT REVIEW (OUTPATIENT)
Age: 65
End: 2020-07-01

## 2020-07-01 DIAGNOSIS — Z98.89 OTHER SPECIFIED POSTPROCEDURAL STATES: Chronic | ICD-10-CM

## 2020-07-01 DIAGNOSIS — C34.90 MALIGNANT NEOPLASM OF UNSPECIFIED PART OF UNSPECIFIED BRONCHUS OR LUNG: ICD-10-CM

## 2020-07-01 PROCEDURE — 74176 CT ABD & PELVIS W/O CONTRAST: CPT | Mod: 26

## 2020-07-01 PROCEDURE — 74176 CT ABD & PELVIS W/O CONTRAST: CPT

## 2020-07-01 PROCEDURE — 71250 CT THORAX DX C-: CPT

## 2020-07-01 PROCEDURE — 71250 CT THORAX DX C-: CPT | Mod: 26

## 2020-08-04 ENCOUNTER — APPOINTMENT (OUTPATIENT)
Dept: THORACIC SURGERY | Facility: CLINIC | Age: 65
End: 2020-08-04
Payer: MEDICARE

## 2020-08-04 VITALS
DIASTOLIC BLOOD PRESSURE: 76 MMHG | OXYGEN SATURATION: 97 % | RESPIRATION RATE: 16 BRPM | HEART RATE: 62 BPM | TEMPERATURE: 98.4 F | SYSTOLIC BLOOD PRESSURE: 128 MMHG

## 2020-08-04 PROCEDURE — 99214 OFFICE O/P EST MOD 30 MIN: CPT

## 2020-08-04 RX ORDER — FLUTICASONE FUROATE AND VILANTEROL TRIFENATATE 100; 25 UG/1; UG/1
100-25 POWDER RESPIRATORY (INHALATION)
Refills: 0 | Status: DISCONTINUED | COMMUNITY
End: 2020-08-04

## 2020-08-04 RX ORDER — RANOLAZINE 500 MG/1
500 TABLET, EXTENDED RELEASE ORAL
Qty: 180 | Refills: 1 | Status: DISCONTINUED | COMMUNITY
Start: 2019-04-23 | End: 2020-08-04

## 2020-08-04 RX ORDER — AMOXICILLIN AND CLAVULANATE POTASSIUM 875; 125 MG/1; MG/1
875-125 TABLET, COATED ORAL
Qty: 14 | Refills: 0 | Status: DISCONTINUED | COMMUNITY
Start: 2020-04-02 | End: 2020-08-04

## 2020-08-04 NOTE — REVIEW OF SYSTEMS
[As Noted in HPI] : as noted in HPI [Wheezing] : wheezing [Cough] : cough [SOB on Exertion] : shortness of breath during exertion [Negative] : Heme/Lymph

## 2020-08-04 NOTE — PHYSICAL EXAM
[Sclera] : the sclera and conjunctiva were normal [Neck Appearance] : the appearance of the neck was normal [] : the neck was supple [Neck Cervical Mass (___cm)] : no neck mass was observed [Respiration, Rhythm And Depth] : normal respiratory rhythm and effort [Auscultation Breath Sounds / Voice Sounds] : lungs were clear to auscultation bilaterally [Heart Rate And Rhythm] : heart rate was normal and rhythm regular [Heart Sounds] : normal S1 and S2 [Examination Of The Chest] : the chest was normal in appearance [Abdomen Soft] : soft [Abdomen Tenderness] : non-tender [Cervical Lymph Nodes Enlarged Posterior Bilaterally] : posterior cervical [Cervical Lymph Nodes Enlarged Anterior Bilaterally] : anterior cervical [Supraclavicular Lymph Nodes Enlarged Bilaterally] : supraclavicular [No CVA Tenderness] : no ~M costovertebral angle tenderness [Abnormal Walk] : normal gait [Skin Color & Pigmentation] : normal skin color and pigmentation [No Focal Deficits] : no focal deficits [Oriented To Time, Place, And Person] : oriented to person, place, and time [Impaired Insight] : insight and judgment were intact [Affect] : the affect was normal

## 2020-08-04 NOTE — HISTORY OF PRESENT ILLNESS
[FreeTextEntry1] : 64 year old male who returns for one year follow up follow up S/P right VATS, RUL wedge resection x1 on 7/15/13 for a T1aNx minimally invasive adenocarcinoma. Because of the final pathology results a redo right VATS with completion RULobectomy and MLND was done performed on 8/16/13. There was no evidence of disease at that time. \par \par CT chest scan 7/1/2020:\par -No pulmonary nodules are noted. \par \par Chest CT on 7/16/19:\par - RUL resection with stable postsurgical changes\par - Lungs otherwise clear\par - No enlarged mediastinal , hilar or axillary lymph nodes\par \par CT Scan of the Chest 7/25/2018: \par - Stable with no evidence of disease.\par \par He returns today for follow up and reports that he feels well.  He admits to shortness of breath with exertion, a dry cough and occasional wheezing.  He denies any fatigue, fever, chills, weight loss, chest pain, hemoptysis, or recent illness.

## 2020-08-04 NOTE — ASSESSMENT
[FreeTextEntry1] : 64 year old male who returns for one year follow up follow up S/P right VATS, RUL wedge resection x1 on 7/15/13 for a T1aNx minimally invasive adenocarcinoma. Because of the final pathology results a redo right VATS with completion RULobectomy and MLND was done performed on 8/16/13. There was no evidence of disease at that time. \par \par CT chest scan 7/1/2020:\par -No pulmonary nodules are noted. \par \par I have reviewed the patient's medical records and diagnostic images during the time of this office visit, and I have made the following recommendation:\par 1.  Follow up in 1 year with noncontrast CT Chest.\par \par I personally performed the services described in the documentation, reviewed the documentation recorded by the scribe in my presence and it accurately and completely records my words and actions.\par \par I, Rosemary Goel NP, am scribing for and the presence of Dr. Osborne, the following sections HISTORY OF PRESENT ILLNESS, PAST MEDICAL/FAMILY/SOCIAL HISTORY; REVIEW OF SYSTEMS; VITAL SIGNS; PHYSICAL EXAM; DISPOSITION.\par

## 2020-08-04 NOTE — CONSULT LETTER
[Dear  ___] : Dear  [unfilled], [Courtesy Letter:] : I had the pleasure of seeing your patient, [unfilled], in my office today. [Please see my note below.] : Please see my note below. [Sincerely,] : Sincerely, [FreeTextEntry2] : Dr. Morales (Pulm/Ref)\par Dr. Bob Mcnamara (PCP/Card) \par  [FreeTextEntry3] : \par \par \par Rambo Osborne MD, FACS \par Chief, Division of Thoracic Surgery \par Director, Minimally Invasive Thoracic Surgery \par Department of Cardiovascular and Thoracic Surgery \par [facility] \par , Cardiovascular and Thoracic Surgery

## 2020-08-14 ENCOUNTER — APPOINTMENT (OUTPATIENT)
Dept: PULMONOLOGY | Facility: CLINIC | Age: 65
End: 2020-08-14
Payer: MEDICARE

## 2020-08-14 VITALS
DIASTOLIC BLOOD PRESSURE: 85 MMHG | OXYGEN SATURATION: 96 % | HEART RATE: 76 BPM | SYSTOLIC BLOOD PRESSURE: 151 MMHG | RESPIRATION RATE: 16 BRPM | TEMPERATURE: 97 F

## 2020-08-14 PROCEDURE — 99213 OFFICE O/P EST LOW 20 MIN: CPT

## 2020-08-14 RX ORDER — ALBUTEROL SULFATE 90 UG/1
108 (90 BASE) AEROSOL, METERED RESPIRATORY (INHALATION)
Qty: 1 | Refills: 0 | Status: DISCONTINUED | COMMUNITY
Start: 2019-10-18 | End: 2020-08-14

## 2020-08-14 NOTE — PHYSICAL EXAM
[No Acute Distress] : no acute distress [Normal Oropharynx] : normal oropharynx [No Neck Mass] : no neck mass [No JVD] : no jvd [Normal S1, S2] : normal s1, s2 [No Murmurs] : no murmurs [Clear to Auscultation Bilaterally] : clear to auscultation bilaterally [Normal to Percussion] : normal to percussion [Benign] : benign [No HSM] : no hsm [No Clubbing] : no clubbing [No Cyanosis] : no cyanosis [No Edema] : no edema [TextBox_2] : Overweight

## 2020-08-14 NOTE — PROCEDURE
[FreeTextEntry1] : 02/07/2020\par Pulmonary function testing\par These data demonstrate a mild-moderate obstructive ventilatory deficit. There is no significant bronchodilator response. There is elevation in the RV/TLC ratio indicative of possible air trapping. There is a mild diffusion impairment. Corrects to normal with lung volume correction \par PFT attached relatively stable function.\par \par \par cpap data downloaded and discussed with patient\par

## 2020-08-14 NOTE — ASSESSMENT
[FreeTextEntry1] : Patient compliant to CPAP therapy and having positive clinical response to treatment. Continue present settings.\par singular daily \par PRN beta agonist\par Wt. loss/exercise\par We will recheck lung function on return to office.

## 2020-08-14 NOTE — DISCUSSION/SUMMARY
[FreeTextEntry1] : Lung ca stable CT seeing Zeltsman next year Followed with cxr\par TAYA on CPAP now on dreamwear. Reviewed did not wear for 1 week but doing well\par COPD clinically stable. \par

## 2020-08-14 NOTE — HISTORY OF PRESENT ILLNESS
[Former] : former [>= 30 pack years] : >= 30 pack years [TextBox_4] : Has been off breo since last visit , has some wheeze with stairs. Only used Ventolin a total of 3 times since February. On singulair\par Minimal cough.\par Had recent ct and saw surgeon , stable r/t in 1 year\par \par due to see Dr Mcnamara for f/u\par \par Using cpap nightly [TextBox_11] : 1 [TextBox_13] : 38 [YearQuit] : 2013

## 2020-09-13 ENCOUNTER — RX RENEWAL (OUTPATIENT)
Age: 65
End: 2020-09-13

## 2020-10-04 ENCOUNTER — RX RENEWAL (OUTPATIENT)
Age: 65
End: 2020-10-04

## 2020-10-30 ENCOUNTER — APPOINTMENT (OUTPATIENT)
Dept: PULMONOLOGY | Facility: CLINIC | Age: 65
End: 2020-10-30
Payer: MEDICARE

## 2020-10-30 VITALS
DIASTOLIC BLOOD PRESSURE: 82 MMHG | RESPIRATION RATE: 15 BRPM | OXYGEN SATURATION: 97 % | HEART RATE: 72 BPM | SYSTOLIC BLOOD PRESSURE: 147 MMHG | TEMPERATURE: 98 F

## 2020-10-30 PROCEDURE — 99213 OFFICE O/P EST LOW 20 MIN: CPT | Mod: 25

## 2020-10-30 PROCEDURE — G0009: CPT

## 2020-10-30 PROCEDURE — 90686 IIV4 VACC NO PRSV 0.5 ML IM: CPT

## 2020-10-30 PROCEDURE — G0008: CPT

## 2020-10-30 PROCEDURE — 90732 PPSV23 VACC 2 YRS+ SUBQ/IM: CPT

## 2020-11-15 ENCOUNTER — RX RENEWAL (OUTPATIENT)
Age: 65
End: 2020-11-15

## 2020-12-21 PROBLEM — Z87.09 HISTORY OF ACUTE BRONCHITIS: Status: RESOLVED | Noted: 2019-02-15 | Resolved: 2020-12-21

## 2020-12-23 PROBLEM — Z87.09 HISTORY OF ACUTE SINUSITIS: Status: RESOLVED | Noted: 2020-04-02 | Resolved: 2020-12-23

## 2021-01-13 ENCOUNTER — APPOINTMENT (OUTPATIENT)
Dept: CT IMAGING | Facility: IMAGING CENTER | Age: 66
End: 2021-01-13
Payer: MEDICARE

## 2021-01-13 ENCOUNTER — NON-APPOINTMENT (OUTPATIENT)
Age: 66
End: 2021-01-13

## 2021-01-13 ENCOUNTER — APPOINTMENT (OUTPATIENT)
Dept: CARDIOLOGY | Facility: CLINIC | Age: 66
End: 2021-01-13
Payer: MEDICARE

## 2021-01-13 ENCOUNTER — OUTPATIENT (OUTPATIENT)
Dept: OUTPATIENT SERVICES | Facility: HOSPITAL | Age: 66
LOS: 1 days | End: 2021-01-13
Payer: MEDICARE

## 2021-01-13 ENCOUNTER — RESULT REVIEW (OUTPATIENT)
Age: 66
End: 2021-01-13

## 2021-01-13 VITALS
DIASTOLIC BLOOD PRESSURE: 76 MMHG | HEART RATE: 67 BPM | WEIGHT: 209 LBS | OXYGEN SATURATION: 98 % | HEIGHT: 68 IN | TEMPERATURE: 98.3 F | SYSTOLIC BLOOD PRESSURE: 118 MMHG | BODY MASS INDEX: 31.67 KG/M2

## 2021-01-13 DIAGNOSIS — Z98.89 OTHER SPECIFIED POSTPROCEDURAL STATES: Chronic | ICD-10-CM

## 2021-01-13 DIAGNOSIS — R10.9 UNSPECIFIED ABDOMINAL PAIN: ICD-10-CM

## 2021-01-13 PROCEDURE — 71250 CT THORAX DX C-: CPT

## 2021-01-13 PROCEDURE — 99214 OFFICE O/P EST MOD 30 MIN: CPT

## 2021-01-13 PROCEDURE — 71250 CT THORAX DX C-: CPT | Mod: 26

## 2021-01-13 PROCEDURE — 93000 ELECTROCARDIOGRAM COMPLETE: CPT

## 2021-01-13 NOTE — PHYSICAL EXAM
[General Appearance - Well Developed] : well developed [Normal Appearance] : normal appearance [Well Groomed] : well groomed [General Appearance - Well Nourished] : well nourished [No Deformities] : no deformities [General Appearance - In No Acute Distress] : no acute distress [Normal Conjunctiva] : the conjunctiva exhibited no abnormalities [Eyelids - No Xanthelasma] : the eyelids demonstrated no xanthelasmas [Normal Oral Mucosa] : normal oral mucosa [No Oral Pallor] : no oral pallor [No Oral Cyanosis] : no oral cyanosis [Normal Jugular Venous A Waves Present] : normal jugular venous A waves present [Normal Jugular Venous V Waves Present] : normal jugular venous V waves present [No Jugular Venous Nichols A Waves] : no jugular venous nichols A waves [Respiration, Rhythm And Depth] : normal respiratory rhythm and effort [Exaggerated Use Of Accessory Muscles For Inspiration] : no accessory muscle use [Auscultation Breath Sounds / Voice Sounds] : lungs were clear to auscultation bilaterally [Heart Rate And Rhythm] : heart rate and rhythm were normal [Murmurs] : no murmurs present [Heart Sounds] : normal S1 and S2 [Abdomen Tenderness] : non-tender [Abdomen Soft] : soft [Abdomen Mass (___ Cm)] : no abdominal mass palpated [Abnormal Walk] : normal gait [Gait - Sufficient For Exercise Testing] : the gait was sufficient for exercise testing [Nail Clubbing] : no clubbing of the fingernails [Cyanosis, Localized] : no localized cyanosis [Petechial Hemorrhages (___cm)] : no petechial hemorrhages [Skin Color & Pigmentation] : normal skin color and pigmentation [] : no rash [No Venous Stasis] : no venous stasis [Skin Lesions] : no skin lesions [No Skin Ulcers] : no skin ulcer [No Xanthoma] : no  xanthoma was observed [Oriented To Time, Place, And Person] : oriented to person, place, and time [Affect] : the affect was normal [Mood] : the mood was normal [No Anxiety] : not feeling anxious [FreeTextEntry1] : reproducible pain upper back and side area

## 2021-01-13 NOTE — HISTORY OF PRESENT ILLNESS
[FreeTextEntry1] : This is a 64yo male with PMH of adenocarcinoma of the lung and prostate, RA, DM2, CAD who presents today for evaluation of left flank pain. patient states this pain started in June of 2020 and was seen by Dr. Srivastava for this, he was ordered for CT of the abdomen/pelvis which he completed and was told was normal. Patient states this pain becomes worse if he is laying on this side. Patient states that the pain is constant and becomes worse when laying on his left side. patient describes this pain as a stretching sensation, he states the pain is about 8/10. He has no other associated s/s.

## 2021-01-17 LAB
ALBUMIN SERPL ELPH-MCNC: 4.8 G/DL
ALP BLD-CCNC: 76 U/L
ALT SERPL-CCNC: 24 U/L
ANION GAP SERPL CALC-SCNC: 14 MMOL/L
APPEARANCE: CLEAR
AST SERPL-CCNC: 20 U/L
BACTERIA: NEGATIVE
BASOPHILS # BLD AUTO: 0.03 K/UL
BASOPHILS NFR BLD AUTO: 0.4 %
BILIRUB SERPL-MCNC: 0.4 MG/DL
BILIRUBIN URINE: NEGATIVE
BLOOD URINE: NEGATIVE
BUN SERPL-MCNC: 18 MG/DL
CALCIUM SERPL-MCNC: 9.9 MG/DL
CHLORIDE SERPL-SCNC: 104 MMOL/L
CHOLEST SERPL-MCNC: 172 MG/DL
CK SERPL-CCNC: 235 U/L
CO2 SERPL-SCNC: 24 MMOL/L
COLOR: NORMAL
CREAT SERPL-MCNC: 1.15 MG/DL
CREAT SPEC-SCNC: 128 MG/DL
EOSINOPHIL # BLD AUTO: 0.07 K/UL
EOSINOPHIL NFR BLD AUTO: 0.8 %
ESTIMATED AVERAGE GLUCOSE: 148 MG/DL
GLUCOSE QUALITATIVE U: NEGATIVE
GLUCOSE SERPL-MCNC: 122 MG/DL
HBA1C MFR BLD HPLC: 6.8 %
HCT VFR BLD CALC: 44.2 %
HDLC SERPL-MCNC: 71 MG/DL
HGB BLD-MCNC: 13 G/DL
HYALINE CASTS: 0 /LPF
IMM GRANULOCYTES NFR BLD AUTO: 0.2 %
KETONES URINE: NEGATIVE
LDLC SERPL CALC-MCNC: 81 MG/DL
LDLC SERPL DIRECT ASSAY-MCNC: 82 MG/DL
LEUKOCYTE ESTERASE URINE: NEGATIVE
LYMPHOCYTES # BLD AUTO: 3.62 K/UL
LYMPHOCYTES NFR BLD AUTO: 43.5 %
MAN DIFF?: NORMAL
MCHC RBC-ENTMCNC: 25.8 PG
MCHC RBC-ENTMCNC: 29.4 GM/DL
MCV RBC AUTO: 87.7 FL
MICROALBUMIN 24H UR DL<=1MG/L-MCNC: <1.2 MG/DL
MICROALBUMIN/CREAT 24H UR-RTO: NORMAL MG/G
MICROSCOPIC-UA: NORMAL
MONOCYTES # BLD AUTO: 0.89 K/UL
MONOCYTES NFR BLD AUTO: 10.7 %
NEUTROPHILS # BLD AUTO: 3.69 K/UL
NEUTROPHILS NFR BLD AUTO: 44.4 %
NITRITE URINE: NEGATIVE
NONHDLC SERPL-MCNC: 100 MG/DL
PH URINE: 6
PLATELET # BLD AUTO: 318 K/UL
POTASSIUM SERPL-SCNC: 3.9 MMOL/L
PROT SERPL-MCNC: 8 G/DL
PROTEIN URINE: NEGATIVE
RBC # BLD: 5.04 M/UL
RBC # FLD: 15.2 %
RED BLOOD CELLS URINE: 1 /HPF
SODIUM SERPL-SCNC: 142 MMOL/L
SPECIFIC GRAVITY URINE: 1.02
SQUAMOUS EPITHELIAL CELLS: 0 /HPF
T4 FREE SERPL-MCNC: 1.2 NG/DL
TRIGL SERPL-MCNC: 96 MG/DL
TSH SERPL-ACNC: 1.46 UIU/ML
UROBILINOGEN URINE: NORMAL
WBC # FLD AUTO: 8.32 K/UL
WHITE BLOOD CELLS URINE: 1 /HPF

## 2021-01-23 DIAGNOSIS — Z20.822 CONTACT WITH AND (SUSPECTED) EXPOSURE TO COVID-19: ICD-10-CM

## 2021-01-25 ENCOUNTER — APPOINTMENT (OUTPATIENT)
Dept: DISASTER EMERGENCY | Facility: CLINIC | Age: 66
End: 2021-01-25

## 2021-01-27 LAB — SARS-COV-2 N GENE NPH QL NAA+PROBE: NOT DETECTED

## 2021-01-29 ENCOUNTER — APPOINTMENT (OUTPATIENT)
Dept: PULMONOLOGY | Facility: CLINIC | Age: 66
End: 2021-01-29
Payer: MEDICARE

## 2021-01-29 VITALS
SYSTOLIC BLOOD PRESSURE: 112 MMHG | RESPIRATION RATE: 16 BRPM | DIASTOLIC BLOOD PRESSURE: 63 MMHG | WEIGHT: 209 LBS | HEIGHT: 66 IN | TEMPERATURE: 98.1 F | HEART RATE: 78 BPM | BODY MASS INDEX: 33.59 KG/M2 | OXYGEN SATURATION: 97 %

## 2021-01-29 PROCEDURE — 99214 OFFICE O/P EST MOD 30 MIN: CPT | Mod: 25

## 2021-01-29 PROCEDURE — 94010 BREATHING CAPACITY TEST: CPT

## 2021-01-29 PROCEDURE — ZZZZZ: CPT

## 2021-01-29 PROCEDURE — 94727 GAS DIL/WSHOT DETER LNG VOL: CPT

## 2021-01-29 PROCEDURE — 94729 DIFFUSING CAPACITY: CPT

## 2021-01-30 NOTE — HISTORY OF PRESENT ILLNESS
[Former] : former [>= 30 pack years] : >= 30 pack years [TextBox_4] : On Singulair, only takes pro air rarely \par no  cough. mild sob with stairs, no exercise\par Had recent ct 1/13/2021 for left side chest pain, no change now getting mri of back. ,\par \par due to see Dr Mcnamara for f/u\par \par Using Cpap nightly, got replacement\par \par On Montelukast and PRN Beta agonist.  [TextBox_13] : 38 [TextBox_11] : 1 [YearQuit] : 2013

## 2021-01-30 NOTE — ASSESSMENT
[FreeTextEntry1] : Patient compliant to CPAP therapy and having positive clinical response to treatment. Continue present settings.\par singular daily \par PRN beta agonist\par Wt. loss/exercise\par F/U CT 1 year. \par

## 2021-01-30 NOTE — DISCUSSION/SUMMARY
[FreeTextEntry1] : Lung ca stable seeing Zeltsman seeing august \par TAYA on CPAP now air fit 20 nasal mask.\par COPD clinically stable. \par Chest discomfort of unclear etiology.  Likely musculoskeletal in origin.\par \par \par \par

## 2021-01-30 NOTE — PROCEDURE
[FreeTextEntry1] : 01/29/2021\par Pulmonary function testing\par These data demonstrate a moderate obstructive ventilatory deficit. There is elevation in the RV/TLC ratio indicative of possible air trapping. There is a mild diffusion impairment. Corrects to normal with lung volume correction \par PFT attached relatively stable function.\par \par \par Cpap data downloaded and discussed with patient\par

## 2021-02-16 ENCOUNTER — OUTPATIENT (OUTPATIENT)
Dept: OUTPATIENT SERVICES | Facility: HOSPITAL | Age: 66
LOS: 1 days | End: 2021-02-16
Payer: MEDICARE

## 2021-02-16 ENCOUNTER — RESULT REVIEW (OUTPATIENT)
Age: 66
End: 2021-02-16

## 2021-02-16 ENCOUNTER — APPOINTMENT (OUTPATIENT)
Dept: MRI IMAGING | Facility: IMAGING CENTER | Age: 66
End: 2021-02-16
Payer: MEDICARE

## 2021-02-16 DIAGNOSIS — R10.9 UNSPECIFIED ABDOMINAL PAIN: ICD-10-CM

## 2021-02-16 DIAGNOSIS — Z98.89 OTHER SPECIFIED POSTPROCEDURAL STATES: Chronic | ICD-10-CM

## 2021-02-16 PROCEDURE — 72146 MRI CHEST SPINE W/O DYE: CPT

## 2021-02-16 PROCEDURE — 72148 MRI LUMBAR SPINE W/O DYE: CPT | Mod: 26,MF

## 2021-02-16 PROCEDURE — 72148 MRI LUMBAR SPINE W/O DYE: CPT

## 2021-02-16 PROCEDURE — 72146 MRI CHEST SPINE W/O DYE: CPT | Mod: 26,ME

## 2021-02-16 PROCEDURE — G1004: CPT

## 2021-02-23 ENCOUNTER — NON-APPOINTMENT (OUTPATIENT)
Age: 66
End: 2021-02-23

## 2021-02-23 NOTE — PROGRESS NOTE ADULT - ATTENDING COMMENTS
Patient was covid tested on 02/18/2021 by urgent Care     Patient is not established with [facility] [place].    Spoke to Juan Manuel. Writer was informed juan manuel is the school nurse for [facility] and is calling regarding the patients covid lab results from 02/18/2021.    Writer informed Juan Manuel that results can not be given as there is no LAZARUS to discuss information and patient is not established in family practice.    Writer was informed that results needed to be given due to them not being in the database and is a current public health emergency.    Writer informed juan manuel, that above will be discussed with our leader and will return her phone call.     Writer was informed from management (Joan) that covid result from 02/18 can not be disclosed with school nurse due to HIPPA and privacy disclosure.     School Nurse can reach out to Public Health or Parents of the Minor for covid lab results.     Manager (Joan) reached out to Juan Manuel Christensen ([facility] School Nurse) and notified her of above.             José Carey, [facility]    [phone]

## 2021-02-28 ENCOUNTER — RX RENEWAL (OUTPATIENT)
Age: 66
End: 2021-02-28

## 2021-03-21 ENCOUNTER — RX RENEWAL (OUTPATIENT)
Age: 66
End: 2021-03-21
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
## 2021-04-02 NOTE — ED ADULT NURSE REASSESSMENT NOTE - ANCILLARY STATUS
lab results pending
went in to draw 2nd enzyme when pt stated "im starting to have pain again". states chest pain going across chest just above nipple line. pt also c.o. increased pain with palpation of area and movement. states he feels "alittle" sob and pain goes into lt arm. skin warm and dry. repeat EKG done, labs sent. Provider at bedside to evaluate patient/lab results pending
Stable

## 2021-07-28 ENCOUNTER — APPOINTMENT (OUTPATIENT)
Dept: RADIOLOGY | Facility: IMAGING CENTER | Age: 66
End: 2021-07-28
Payer: MEDICARE

## 2021-07-28 ENCOUNTER — APPOINTMENT (OUTPATIENT)
Dept: CT IMAGING | Facility: IMAGING CENTER | Age: 66
End: 2021-07-28
Payer: MEDICARE

## 2021-07-28 ENCOUNTER — OUTPATIENT (OUTPATIENT)
Dept: OUTPATIENT SERVICES | Facility: HOSPITAL | Age: 66
LOS: 1 days | End: 2021-07-28
Payer: MEDICARE

## 2021-07-28 DIAGNOSIS — Z98.89 OTHER SPECIFIED POSTPROCEDURAL STATES: Chronic | ICD-10-CM

## 2021-07-28 DIAGNOSIS — C34.90 MALIGNANT NEOPLASM OF UNSPECIFIED PART OF UNSPECIFIED BRONCHUS OR LUNG: ICD-10-CM

## 2021-07-28 PROCEDURE — 71250 CT THORAX DX C-: CPT | Mod: 26,MH

## 2021-07-28 PROCEDURE — 71250 CT THORAX DX C-: CPT

## 2021-08-03 ENCOUNTER — APPOINTMENT (OUTPATIENT)
Dept: THORACIC SURGERY | Facility: CLINIC | Age: 66
End: 2021-08-03
Payer: MEDICARE

## 2021-08-03 VITALS
WEIGHT: 210 LBS | DIASTOLIC BLOOD PRESSURE: 76 MMHG | TEMPERATURE: 99.1 F | BODY MASS INDEX: 33.75 KG/M2 | HEART RATE: 97 BPM | OXYGEN SATURATION: 97 % | RESPIRATION RATE: 18 BRPM | SYSTOLIC BLOOD PRESSURE: 141 MMHG | HEIGHT: 66 IN

## 2021-08-03 PROCEDURE — 99214 OFFICE O/P EST MOD 30 MIN: CPT

## 2021-08-03 NOTE — HISTORY OF PRESENT ILLNESS
[FreeTextEntry1] : 65 year old male S/P right VATS, RUL wedge resection x1 on 7/15/13 for a T1aNx minimally invasive adenocarcinoma. Because of the final pathology results a redo right VATS with completion RULobectomy and MLND was done performed on 8/16/13. There was no evidence of disease at that time. \par \par CT Scan of the Chest 7/25/2018: \par - Stable with no evidence of disease.\par \par Chest CT on 7/16/19:\par - RUL resection with stable postsurgical changes\par - Lungs otherwise clear\par - No enlarged mediastinal , hilar or axillary lymph nodes\par \par CT chest scan 7/1/2020:\par -No pulmonary nodules are noted. \par \par CT Chest on 7/28/21:\par - Stable exam\par \par Patient presents to office for follow up. Patient c/o SOB on exertion, c/o dry cough occasionally, denies cough, chest pain, fever, chills.

## 2021-08-03 NOTE — ASSESSMENT
[FreeTextEntry1] : 65 year old male S/P right VATS, RUL wedge resection x1 on 7/15/13 for a T1aNx minimally invasive adenocarcinoma. Because of the final pathology results a redo right VATS with completion RULobectomy and MLND was done performed on 8/16/13. There was no evidence of disease at that time. \par \par I have reviewed the patient's medical records and diagnostic images at time of this office consultation and have made the following recommendation:\par 1. CT chest reviewed and explained to patient, I recommended patient to return to office in 12 months with CT Chest without contrast.\par \par I personally performed the services described in the documentation, reviewed the documentation recorded by the scribe in my presence and it accurately and completely records my words and actions.\par \par I, Antonia Pierson, DENNY, am scribing for and the presence of VERNA López, the following sections HISTORY OF PRESENT ILLNESS, PAST MEDICAL/FAMILY/SOCIAL HISTORY; REVIEW OF SYSTEMS; VITAL SIGNS; PHYSICAL EXAM; DISPOSITION. \par

## 2021-08-23 ENCOUNTER — APPOINTMENT (OUTPATIENT)
Dept: PULMONOLOGY | Facility: CLINIC | Age: 66
End: 2021-08-23

## 2021-08-31 ENCOUNTER — NON-APPOINTMENT (OUTPATIENT)
Age: 66
End: 2021-08-31

## 2021-09-01 ENCOUNTER — NON-APPOINTMENT (OUTPATIENT)
Age: 66
End: 2021-09-01

## 2021-09-01 ENCOUNTER — APPOINTMENT (OUTPATIENT)
Dept: CARDIOLOGY | Facility: CLINIC | Age: 66
End: 2021-09-01
Payer: MEDICARE

## 2021-09-01 VITALS
BODY MASS INDEX: 34.06 KG/M2 | WEIGHT: 211 LBS | SYSTOLIC BLOOD PRESSURE: 140 MMHG | DIASTOLIC BLOOD PRESSURE: 80 MMHG | HEART RATE: 88 BPM | OXYGEN SATURATION: 97 % | TEMPERATURE: 98 F

## 2021-09-01 VITALS — DIASTOLIC BLOOD PRESSURE: 68 MMHG | SYSTOLIC BLOOD PRESSURE: 138 MMHG

## 2021-09-01 DIAGNOSIS — R07.89 OTHER CHEST PAIN: ICD-10-CM

## 2021-09-01 DIAGNOSIS — R79.89 OTHER SPECIFIED ABNORMAL FINDINGS OF BLOOD CHEMISTRY: ICD-10-CM

## 2021-09-01 PROCEDURE — 99214 OFFICE O/P EST MOD 30 MIN: CPT

## 2021-09-01 PROCEDURE — 93000 ELECTROCARDIOGRAM COMPLETE: CPT

## 2021-09-01 NOTE — HISTORY OF PRESENT ILLNESS
[FreeTextEntry1] :  This is a 65 year old gentlemen with a PMH of adenocarcinoma of the lung and prostate, Obesity,  RA, DM2, CAD presents today for follow up. Patient states that he is overall doing OK, however, he had a pain to the right side of his chest that was painful to the touch. Patient states that the pain was a "soreness" that lasted for a couple of days. Patient states that her took Tylenol and it resolved. Patient denies dyspnea, palpitations, nausea, vomiting, dizziness and lightheadedness.\par

## 2021-09-15 ENCOUNTER — NON-APPOINTMENT (OUTPATIENT)
Age: 66
End: 2021-09-15

## 2021-09-15 ENCOUNTER — APPOINTMENT (OUTPATIENT)
Dept: CARDIOLOGY | Facility: CLINIC | Age: 66
End: 2021-09-15

## 2021-09-17 ENCOUNTER — NON-APPOINTMENT (OUTPATIENT)
Age: 66
End: 2021-09-17

## 2021-09-20 ENCOUNTER — APPOINTMENT (OUTPATIENT)
Dept: ORTHOPEDIC SURGERY | Facility: CLINIC | Age: 66
End: 2021-09-20
Payer: MEDICARE

## 2021-09-20 VITALS
DIASTOLIC BLOOD PRESSURE: 67 MMHG | HEIGHT: 66 IN | WEIGHT: 211 LBS | HEART RATE: 71 BPM | SYSTOLIC BLOOD PRESSURE: 142 MMHG | BODY MASS INDEX: 33.91 KG/M2

## 2021-09-20 DIAGNOSIS — M51.34 OTHER INTERVERTEBRAL DISC DEGENERATION, THORACIC REGION: ICD-10-CM

## 2021-09-20 PROCEDURE — 99204 OFFICE O/P NEW MOD 45 MIN: CPT

## 2021-09-20 NOTE — PHYSICAL EXAM
[Normal] : Gait: normal [Cuevas's Sign] : negative Cuevas's sign [Pronator Drift] : negative pronator drift [SLR] : negative straight leg raise [de-identified] : 5 out of 5 motor strength, sensation is intact and symmetrical full range of motion flexion extension and rotation, no palpatory tenderness full range of motion of hips knees shoulders and elbows (all four extremities), no atrophy, negative straight leg raise, no pathological reflexes, no swelling, normal ambulation, no apparent distress skin is intact, no palpable lymph nodes, no upper or lower extremity instability, alert and oriented x3 and normal mood. Normal finger-to nose test.  [de-identified] : MR SPINE LUMBAR/ MR SPINE THORACIC    02/16/2021   (PACS) \par \par FINDINGS:\par \par OSSEOUS STRUCTURES\par  Fractures: None.\par  Alignment: Maintained.\par  Marrow Signal: Endplate degenerative changes are present. A few small lumbar vertebral hemangioma/fatty foci are noted. No concerning osseous lesions are seen. A few scattered Schmorl's nodes are also noted.\par \par SPINAL CORD\par  Signal: Normal.\par  Conus Medullaris: Terminates at L1.\par \par DISC LEVELS\par C6-C7: On sagittal sequences there is moderate loss of disc height with posterior disc osteophyte complex and suspected mild central canal stenosis.\par \par C7-T1: Moderate loss of disc height is present with posterior disc osteophyte complex causing mild central canal stenosis. Mild to moderate bilateral neural foraminal stenosis is present. There is mild right and mild to moderate left facet arthrosis.\par \par T1-T2: Posterior disc osteophyte complex is present with mild central canal stenosis. Mild bilateral neural foraminal stenosis is present. There is mild bilateral facet arthrosis.\par \par T2-T3: Posterior disc osteophyte complex is present without significant central canal stenosis. Mild-to-moderate right neural foraminal stenosis is present. There is mild right facet arthrosis.\par \par T3-T4: Posterior disc bulging is present with mild central canal stenosis. Moderate right neural foraminal stenosis is present.\par \par T4-T5: Moderate severe loss of disc height is present with disc bulging complex but no significant stenosis.\par \par T5-T6: Moderate severe loss of disc height is present with posterior disc bulging but no central canal stenosis. Moderate right and mild left neural foraminal stenosis is present. There is mild bilateral facet arthrosis.\par \par T6-T7: Moderate to severe loss of disc height is present with disc bulging but no central canal stenosis. Moderate right and mild-to-moderate left neural foraminal stenosis is present. There is mild bilateral facet arthrosis.\par \par T7-T8: Moderate severe loss of disc height is present with disc bulging and ligamentum flavum hypertrophy contributing to mild central canal stenosis. Mild bilateral neural foraminal stenosis is present. There is mild bilateral facet arthrosis.\par \par T8-T9: Moderate severe loss of disc height is present with disc bulging and ligamentum flavum hypertrophy contributing to mild-to-moderate central canal stenosis. Moderate bilateral neural foraminal stenosis is present. There is mild bilateral facet arthrosis.\par \par T9-T10: Moderate severe loss of disc height is present with disc bulging causing mild central canal stenosis. Mild-to-moderate left neural foraminal stenosis is present. There is mild bilateral facet arthrosis.\par \par T10-T11: Severe loss of disc height is present with disc bulging but no central canal stenosis. Moderate bilateral neural foraminal stenosis is present. There is mild bilateral facet arthrosis.\par \par T11-T12: Mild to moderate loss of disc height is present with disc bulging and ligamentum flavum hypertrophy contributing to mild-to-moderate central canal stenosis. Mild-to-moderate right and moderate left neural foraminal stenosis is present. There is mild bilateral facet arthrosis.\par \par T12-L1: Disc bulging is present without central canal stenosis. Mild lateral neural foraminal stenosis is present. There is mild bilateral facet arthrosis.\par \par L1-L2: Disc bulging is present without central canal stenosis. Mild bilateral neural foraminal stenosis is present.\par \par L2-L3: Mild loss of disc height is present with disc bulging but no central canal stenosis. Mild-to-moderate lateral neural foraminal stenosis is present.\par \par L3-L4: Moderate loss of disc height is present with disc bulging but no central canal stenosis. Mild/moderate bilateral neural foraminal stenosis is present.\par \par L4-L5: Moderate loss of disc height is present posteriorly with disc bulging but no central canal stenosis. Moderate right and mild to moderate left neural foraminal stenosis is present. There is mild-to-moderate bilateral facet arthrosis.\par \par L5-S1: Mild to moderate loss of disc height is present with disc bulging and slight superimposed right paracentral disc extrusion extending along the posterior margin of L3. There is no central canal stenosis. Moderate bilateral neural foraminal stenosis is present. There is moderate right and moderate severe left facet arthrosis.\par \par VISUALIZED SACROILIAC JOINTS\par Mild bilateral facet arthrosis is noted.\par \par SOFT TISSUES\par Unremarkable.\par \par IMPRESSION:\par 1. No osseous metastases are appreciated.\par 2. Diffuse multilevel degenerative disc disease of the thoracic and lumbar spine.\par 3. Mild to moderate central canal stenosis is present at T8-T9 and T11-T12.\par 4. Mild additional scattered thoracic central canal stenosis is noted.\par 5. There is no significant central canal stenosis of the lumbar spine.\par 6. Variable neural foraminal stenosis is present throughout.\par \par \par \par

## 2021-09-20 NOTE — ADDENDUM
[FreeTextEntry1] : Medical record entries made by the scribe today, were at my direction and personally dictated to them by me, Dr. Nasir Vidal. I have reviewed the chart and agree that the record accurately reflects my personal performance of the history, physical exam, assessment, and plan.  09/20/2021

## 2021-09-20 NOTE — HISTORY OF PRESENT ILLNESS
[Stable] : stable [de-identified] : 66 year old male  presenting for initial evaluation of chronic lower back pain, states that it has flared up since last week.\par Pain radiates down B/L LE posteriorly to the knees.\par Denies numbness/tingling. \par Prolonged sitting and laying down aggravates the pain. \par Takes ultracet prescribed by Dr. Mcnamara which he states helps.\par States to have tried PT and one epidural injections in the past with mild relief. \par No recent PT or chiropractic care.\par S/P LESI x 10 years ago but no relief. \par Today, states to feel pain in pelvic region radiating down to RLE.\par No fever chills sweats nausea vomiting no bowel or bladder dysfunction, no recent weight loss or gain no night pain. This history is in addition to the intake form that I personally reviewed. \par \par

## 2021-09-20 NOTE — DISCUSSION/SUMMARY
[Surgical risks reviewed] : Surgical risks reviewed [de-identified] : Low back pain. \par pelvic pain radiating to RLE. Tthoracic and lumbar degenerative disc disease.\par Discussed all options. \par Discussed surgical options. \par MDX x 2 \par Lumbar HEP. \par F/u one month.\par All questions were answered, all alternatives discussed and the patient is in complete agreement with that plan. Follow-up appointment as instructed. Any issues and the patient will call or come in sooner. All options discussed including rest, medicine, home exercise, acupuncture, Chiropractic care, Physical Therapy, Pain management, and last resort surgery. \par Thank you for the consultation. \par

## 2021-09-29 ENCOUNTER — APPOINTMENT (OUTPATIENT)
Dept: CARDIOLOGY | Facility: CLINIC | Age: 66
End: 2021-09-29
Payer: MEDICARE

## 2021-09-29 ENCOUNTER — TRANSCRIPTION ENCOUNTER (OUTPATIENT)
Age: 66
End: 2021-09-29

## 2021-09-29 PROCEDURE — A9500: CPT

## 2021-09-29 PROCEDURE — 93015 CV STRESS TEST SUPVJ I&R: CPT

## 2021-09-29 PROCEDURE — 78452 HT MUSCLE IMAGE SPECT MULT: CPT

## 2021-09-29 PROCEDURE — 93306 TTE W/DOPPLER COMPLETE: CPT

## 2021-09-29 RX ORDER — REGADENOSON 0.08 MG/ML
0.4 INJECTION, SOLUTION INTRAVENOUS
Qty: 1 | Refills: 0 | Status: COMPLETED | OUTPATIENT
Start: 2021-09-29

## 2021-09-29 RX ADMIN — REGADENOSON 5 MG/5ML: 0.08 INJECTION, SOLUTION INTRAVENOUS at 00:00

## 2021-09-30 ENCOUNTER — NON-APPOINTMENT (OUTPATIENT)
Age: 66
End: 2021-09-30

## 2021-10-15 ENCOUNTER — APPOINTMENT (OUTPATIENT)
Dept: PULMONOLOGY | Facility: CLINIC | Age: 66
End: 2021-10-15
Payer: MEDICARE

## 2021-10-15 VITALS
SYSTOLIC BLOOD PRESSURE: 184 MMHG | OXYGEN SATURATION: 96 % | HEART RATE: 71 BPM | TEMPERATURE: 98.1 F | DIASTOLIC BLOOD PRESSURE: 83 MMHG

## 2021-10-15 DIAGNOSIS — Z23 ENCOUNTER FOR IMMUNIZATION: ICD-10-CM

## 2021-10-15 PROCEDURE — G0008: CPT

## 2021-10-15 PROCEDURE — G0009: CPT

## 2021-10-15 PROCEDURE — 90670 PCV13 VACCINE IM: CPT

## 2021-10-15 PROCEDURE — 99214 OFFICE O/P EST MOD 30 MIN: CPT | Mod: 25

## 2021-10-15 PROCEDURE — 90662 IIV NO PRSV INCREASED AG IM: CPT

## 2021-10-15 NOTE — DISCUSSION/SUMMARY
[FreeTextEntry1] : Status post resection of lung carcinoma.  Clinically stable.\par TAYA on CPAP now air fit 20 nasal mask.  Interested in inspire.\par COPD clinically stable. \par \par \par \par \par

## 2021-10-15 NOTE — PROCEDURE
[FreeTextEntry1] : \par \par \par Cpap data downloaded and discussed with patient.  Needs better compliance.\par \par Flu and Prevnar administered \par

## 2021-10-15 NOTE — HISTORY OF PRESENT ILLNESS
[Former] : former [>= 30 pack years] : >= 30 pack years [TextBox_4] : On Singulair, only takes pro air rarely \par no  cough. mild sob with stairs, no exercise\par \par Using Cpap most nights, got replacement mask. Had leak\par \par On Montelukast and PRN Beta agonist. \par Some recent difficulty with allergies.\par Going to see allergist. [TextBox_11] : 1 [TextBox_13] : 38 [YearQuit] : 2013

## 2021-10-15 NOTE — ASSESSMENT
[FreeTextEntry1] : Urge compliance to CPAP.  Continue present settings.\par Evaluation for Inspire. Reviewed process. \par singular daily \par PRN beta agonist\par Wt. loss/exercise\par F/U CT 1 year. \par Repeat lung function on return to office in 3 to 4 months.\par

## 2021-10-21 ENCOUNTER — NON-APPOINTMENT (OUTPATIENT)
Age: 66
End: 2021-10-21

## 2021-10-21 PROBLEM — R79.89 ABNORMAL CBC: Status: ACTIVE | Noted: 2021-10-21

## 2021-10-21 LAB
25(OH)D3 SERPL-MCNC: 74.2 NG/ML
ALBUMIN SERPL ELPH-MCNC: 4.4 G/DL
ALP BLD-CCNC: 78 U/L
ALT SERPL-CCNC: 22 U/L
ANION GAP SERPL CALC-SCNC: 13 MMOL/L
AST SERPL-CCNC: 20 U/L
BASOPHILS # BLD AUTO: 0.05 K/UL
BASOPHILS NFR BLD AUTO: 0.6 %
BILIRUB DIRECT SERPL-MCNC: 0.1 MG/DL
BILIRUB INDIRECT SERPL-MCNC: 0.2 MG/DL
BILIRUB SERPL-MCNC: 0.3 MG/DL
BUN SERPL-MCNC: 14 MG/DL
CALCIUM SERPL-MCNC: 9.5 MG/DL
CHLORIDE SERPL-SCNC: 105 MMOL/L
CHOLEST SERPL-MCNC: 167 MG/DL
CK SERPL-CCNC: 233 U/L
CO2 SERPL-SCNC: 24 MMOL/L
CREAT SERPL-MCNC: 1.08 MG/DL
EOSINOPHIL # BLD AUTO: 0.54 K/UL
EOSINOPHIL NFR BLD AUTO: 7 %
ESTIMATED AVERAGE GLUCOSE: 157 MG/DL
FERRITIN SERPL-MCNC: 173 NG/ML
FOLATE SERPL-MCNC: >20 NG/ML
GLUCOSE SERPL-MCNC: 119 MG/DL
HBA1C MFR BLD HPLC: 7.1 %
HCT VFR BLD CALC: 38.7 %
HDLC SERPL-MCNC: 53 MG/DL
HGB BLD-MCNC: 11.9 G/DL
IMM GRANULOCYTES NFR BLD AUTO: 0.1 %
IRON SATN MFR SERPL: 21 %
IRON SERPL-MCNC: 66 UG/DL
LDLC SERPL CALC-MCNC: 78 MG/DL
LYMPHOCYTES # BLD AUTO: 2.83 K/UL
LYMPHOCYTES NFR BLD AUTO: 36.5 %
MAN DIFF?: NORMAL
MCHC RBC-ENTMCNC: 26 PG
MCHC RBC-ENTMCNC: 30.7 GM/DL
MCV RBC AUTO: 84.7 FL
MONOCYTES # BLD AUTO: 0.75 K/UL
MONOCYTES NFR BLD AUTO: 9.7 %
NEUTROPHILS # BLD AUTO: 3.57 K/UL
NEUTROPHILS NFR BLD AUTO: 46.1 %
NONHDLC SERPL-MCNC: 114 MG/DL
PLATELET # BLD AUTO: 273 K/UL
POTASSIUM SERPL-SCNC: 3.9 MMOL/L
PROT SERPL-MCNC: 7.5 G/DL
RBC # BLD: 4.57 M/UL
RBC # FLD: 14.1 %
SODIUM SERPL-SCNC: 142 MMOL/L
T4 FREE SERPL-MCNC: 1.2 NG/DL
TIBC SERPL-MCNC: 308 UG/DL
TRIGL SERPL-MCNC: 178 MG/DL
TSH SERPL-ACNC: 0.86 UIU/ML
UIBC SERPL-MCNC: 242 UG/DL
VIT B12 SERPL-MCNC: 1062 PG/ML
WBC # FLD AUTO: 7.75 K/UL

## 2021-10-21 RX ORDER — CLOPIDOGREL BISULFATE 75 MG/1
75 TABLET, FILM COATED ORAL
Qty: 90 | Refills: 1 | Status: DISCONTINUED | COMMUNITY
Start: 2020-11-15 | End: 2021-10-21

## 2021-10-25 ENCOUNTER — APPOINTMENT (OUTPATIENT)
Dept: ORTHOPEDIC SURGERY | Facility: CLINIC | Age: 66
End: 2021-10-25
Payer: MEDICARE

## 2021-10-25 VITALS
DIASTOLIC BLOOD PRESSURE: 70 MMHG | BODY MASS INDEX: 33.75 KG/M2 | HEIGHT: 66 IN | HEART RATE: 66 BPM | SYSTOLIC BLOOD PRESSURE: 119 MMHG | WEIGHT: 210 LBS

## 2021-10-25 DIAGNOSIS — M54.16 RADICULOPATHY, LUMBAR REGION: ICD-10-CM

## 2021-10-25 PROCEDURE — 99214 OFFICE O/P EST MOD 30 MIN: CPT

## 2021-11-26 ENCOUNTER — APPOINTMENT (OUTPATIENT)
Dept: ENDOCRINOLOGY | Facility: CLINIC | Age: 66
End: 2021-11-26

## 2021-11-26 NOTE — HISTORY OF PRESENT ILLNESS
[FreeTextEntry1] : Mr. WALLS is a 66 year old male who returns today in follow up with regard to a history of type 2 diabetes mellitus. The diabetes mellitus hs been present for ? number of years as he had been calling this pre dm but at this office A1c was 6.7 in April of 2019 and 6.8 in august of 2019. There is no known history of retinopathy, nephropathy. He too denies any history of neuropathy. On no medication for the dm. HGM of late has not been tested. .There has been no significant hypoglycemia. denies any chest pain, sob, neurologic or ophthalmologic complaints. He too denies any new podiatric concerns. He is up to date with his ophthalmologic visit. Notes some itching and some numbness in hands-bialt. UTD with with optho-no retinopathy.\par Additional medical history includes that of Prostate Ca dx 8/14-low grade Too with cad hx 9 stents 2 more placed last February, hyperlipidemia, Minor mi, htn and COPD and hx of adenocarcinoma lung 2013 s/p rt upper lobectomy.\par

## 2021-12-15 ENCOUNTER — RX RENEWAL (OUTPATIENT)
Age: 66
End: 2021-12-15

## 2021-12-19 ENCOUNTER — RX RENEWAL (OUTPATIENT)
Age: 66
End: 2021-12-19

## 2022-01-17 ENCOUNTER — RX RENEWAL (OUTPATIENT)
Age: 67
End: 2022-01-17

## 2022-01-18 ENCOUNTER — RX RENEWAL (OUTPATIENT)
Age: 67
End: 2022-01-18

## 2022-01-27 ENCOUNTER — NON-APPOINTMENT (OUTPATIENT)
Age: 67
End: 2022-01-27

## 2022-01-27 ENCOUNTER — APPOINTMENT (OUTPATIENT)
Dept: PULMONOLOGY | Facility: CLINIC | Age: 67
End: 2022-01-27
Payer: MEDICARE

## 2022-01-27 VITALS
RESPIRATION RATE: 16 BRPM | DIASTOLIC BLOOD PRESSURE: 84 MMHG | HEART RATE: 73 BPM | SYSTOLIC BLOOD PRESSURE: 143 MMHG | OXYGEN SATURATION: 96 % | TEMPERATURE: 98.3 F

## 2022-01-27 DIAGNOSIS — R06.2 WHEEZING: ICD-10-CM

## 2022-01-27 PROCEDURE — 71046 X-RAY EXAM CHEST 2 VIEWS: CPT

## 2022-01-27 PROCEDURE — 99214 OFFICE O/P EST MOD 30 MIN: CPT | Mod: 25

## 2022-01-27 RX ORDER — METHYLPREDNISOLONE 4 MG/1
4 TABLET ORAL
Qty: 1 | Refills: 1 | Status: COMPLETED | COMMUNITY
Start: 2021-09-20 | End: 2022-01-27

## 2022-01-27 RX ORDER — METHYLPREDNISOLONE 4 MG/1
4 TABLET ORAL
Qty: 1 | Refills: 0 | Status: COMPLETED | COMMUNITY
Start: 2021-10-25 | End: 2022-01-27

## 2022-01-27 NOTE — REVIEW OF SYSTEMS
[Negative] : Endocrine [Cough] : cough [Wheezing] : wheezing [TextBox_14] : runny nose  [TextBox_30] : See HPI

## 2022-01-27 NOTE — PROCEDURE
[FreeTextEntry1] : \par  Xray Chest 2 Views PA/Lat             Final\par \par No Documents Attached\par \par \par \par \par   \par Chest x-ray PA and lateral views performed in my office today showed clear lungs, no evidence of infiltrates or pleural effusions. \par \par \par  Ordered by: STEFAN GONZALEZ       Collected/Examined: 27Jan2022 10:33AM       \par Verification Required       Stage: Final       \par  Performed at: In Office       Performed by: STEFAN GONZALEZ       Resulted: 27Jan2022 10:33AM       Last Updated: 27Jan2022 10:34AM

## 2022-01-27 NOTE — PHYSICAL EXAM
[No Acute Distress] : no acute distress [Normal Oropharynx] : normal oropharynx [Normal Appearance] : normal appearance [No Neck Mass] : no neck mass [Normal Rate/Rhythm] : normal rate/rhythm [Normal S1, S2] : normal s1, s2 [No Murmurs] : no murmurs [No Resp Distress] : no resp distress [Wheeze] : wheeze [No Abnormalities] : no abnormalities [Benign] : benign [Normal Gait] : normal gait [No Clubbing] : no clubbing [No Cyanosis] : no cyanosis [No Edema] : no edema [FROM] : FROM [Normal Color/ Pigmentation] : normal color/ pigmentation [No Focal Deficits] : no focal deficits [Oriented x3] : oriented x3 [Normal Affect] : normal affect [Clear to Auscultation Bilaterally] : clear to auscultation bilaterally

## 2022-01-27 NOTE — HISTORY OF PRESENT ILLNESS
[TextBox_4] : Here for acute visit \par \par Nonproductive cough, runny nose, chest tightness, and SOB x 2 weeks; had COVID PCR test last Friday, which was negative \par \par Denies fever or sore throat

## 2022-01-27 NOTE — REASON FOR VISIT
[Acute] : an acute visit [Cough] : cough [Shortness of Breath] : shortness of breath [Wheezing] : wheezing [Asthma] : asthma [TextBox_44] : Chest tightness

## 2022-01-27 NOTE — ASSESSMENT
[FreeTextEntry1] : Advised on well hydration and rest.  \par Start Z-Nima and Medrol Dosepak.\par Albuterol HFA as needed for shortness of breath/wheezing\par Pulmonary follow-up with Dr. Morales.

## 2022-02-06 ENCOUNTER — RX RENEWAL (OUTPATIENT)
Age: 67
End: 2022-02-06

## 2022-02-07 NOTE — ED CDU PROVIDER NOTE - CROS ED EYES ALL NEG
negative... Topical Clindamycin Counseling: Patient counseled that this medication may cause skin irritation or allergic reactions.  In the event of skin irritation, the patient was advised to reduce the amount of the drug applied or use it less frequently.   The patient verbalized understanding of the proper use and possible adverse effects of clindamycin.  All of the patient's questions and concerns were addressed.

## 2022-03-24 LAB — SARS-COV-2 N GENE NPH QL NAA+PROBE: NOT DETECTED

## 2022-03-28 ENCOUNTER — APPOINTMENT (OUTPATIENT)
Dept: PULMONOLOGY | Facility: CLINIC | Age: 67
End: 2022-03-28
Payer: MEDICARE

## 2022-03-28 VITALS
OXYGEN SATURATION: 95 % | WEIGHT: 210 LBS | TEMPERATURE: 98.3 F | DIASTOLIC BLOOD PRESSURE: 60 MMHG | HEART RATE: 67 BPM | SYSTOLIC BLOOD PRESSURE: 100 MMHG | BODY MASS INDEX: 33.9 KG/M2

## 2022-03-28 LAB — POCT - HEMOGLOBIN (HGB), QUANTITATIVE, TRANSCUTANEOUS: 13.9

## 2022-03-28 PROCEDURE — 94060 EVALUATION OF WHEEZING: CPT

## 2022-03-28 PROCEDURE — ZZZZZ: CPT

## 2022-03-28 PROCEDURE — 94727 GAS DIL/WSHOT DETER LNG VOL: CPT

## 2022-03-28 PROCEDURE — 94729 DIFFUSING CAPACITY: CPT

## 2022-03-28 PROCEDURE — 99214 OFFICE O/P EST MOD 30 MIN: CPT | Mod: 25

## 2022-03-28 PROCEDURE — 88738 HGB QUANT TRANSCUTANEOUS: CPT

## 2022-03-28 RX ORDER — METHYLPREDNISOLONE 4 MG/1
4 TABLET ORAL
Qty: 1 | Refills: 0 | Status: DISCONTINUED | COMMUNITY
Start: 2022-01-27 | End: 2022-03-28

## 2022-03-28 RX ORDER — AZELASTINE HYDROCHLORIDE 137 UG/1
137 SPRAY, METERED NASAL
Qty: 30 | Refills: 0 | Status: ACTIVE | COMMUNITY
Start: 2021-10-15

## 2022-03-28 RX ORDER — AZITHROMYCIN 250 MG/1
250 TABLET, FILM COATED ORAL
Qty: 1 | Refills: 0 | Status: DISCONTINUED | COMMUNITY
Start: 2022-01-27 | End: 2022-03-28

## 2022-03-28 RX ORDER — FINASTERIDE 5 MG/1
5 TABLET, FILM COATED ORAL
Qty: 90 | Refills: 0 | Status: ACTIVE | COMMUNITY
Start: 2021-10-27

## 2022-03-28 NOTE — DISCUSSION/SUMMARY
[FreeTextEntry1] : Status post resection of lung carcinoma.  Clinically stable.\par TAYA on CPAP \par COPD clinically and functionally stable. \par Overweightt\par \par \par \par \par

## 2022-03-28 NOTE — PHYSICAL EXAM
[No Acute Distress] : no acute distress [Normal Oropharynx] : normal oropharynx [No JVD] : no jvd [No Neck Mass] : no neck mass [Normal S1, S2] : normal s1, s2 [No Murmurs] : no murmurs [Clear to Auscultation Bilaterally] : clear to auscultation bilaterally [Normal to Percussion] : normal to percussion [Benign] : benign [No HSM] : no hsm [No Clubbing] : no clubbing [No Edema] : no edema [No Cyanosis] : no cyanosis [TextBox_2] : Overweight

## 2022-03-28 NOTE — ASSESSMENT
[FreeTextEntry1] : Patient compliant to CPAP therapy and having positive clinical response to treatment. Continue present settings.\par wants to wait on Inspire\par singular daily \par Restart Symbicort 160\par PRN beta agonist\par Wt. loss/exercise\par F/U CT 1 year. July 2022\par \par

## 2022-03-28 NOTE — PROCEDURE
[FreeTextEntry1] : 03/28/2022\par Pulmonary function testing\par These data demonstrate a moderate obstructive ventilatory deficit. There is a significant bronchodilator response There is elevation in the RV/TLC ratio indicative of possible air trapping. There is a mild-mod diffusion impairment Corrects to normal with lung volume correction \par PFT attached relatively stable function.\par \par \par Cpap data downloaded and discussed with patient.\par \par

## 2022-03-28 NOTE — HISTORY OF PRESENT ILLNESS
[Former] : former [TextBox_4] : On Singulair, only takes pro air rarely \par no  cough. mild sob with stairs, no exercise\par \par Felt better after after steroid and antibiotics in Jan 2022\par \par increase use of Cpap nightly [TextBox_11] : 1 [TextBox_13] : 38 [YearQuit] : 2013

## 2022-04-13 NOTE — ED ADULT NURSE NOTE - CCCP TRG CHIEF CMPLNT
cough/headache Bexarotene Pregnancy And Lactation Text: This medication is Pregnancy Category X and should not be given to women who are pregnant or may become pregnant. This medication should not be used if you are breast feeding.

## 2022-04-20 ENCOUNTER — RX RENEWAL (OUTPATIENT)
Age: 67
End: 2022-04-20

## 2022-05-02 ENCOUNTER — TRANSCRIPTION ENCOUNTER (OUTPATIENT)
Age: 67
End: 2022-05-02

## 2022-06-12 ENCOUNTER — RX RENEWAL (OUTPATIENT)
Age: 67
End: 2022-06-12

## 2022-06-13 ENCOUNTER — OUTPATIENT (OUTPATIENT)
Dept: OUTPATIENT SERVICES | Facility: HOSPITAL | Age: 67
LOS: 1 days | End: 2022-06-13
Payer: MEDICARE

## 2022-06-13 ENCOUNTER — APPOINTMENT (OUTPATIENT)
Dept: MRI IMAGING | Facility: IMAGING CENTER | Age: 67
End: 2022-06-13
Payer: MEDICARE

## 2022-06-13 DIAGNOSIS — Z98.89 OTHER SPECIFIED POSTPROCEDURAL STATES: Chronic | ICD-10-CM

## 2022-06-13 DIAGNOSIS — Z00.8 ENCOUNTER FOR OTHER GENERAL EXAMINATION: ICD-10-CM

## 2022-06-13 PROCEDURE — 72197 MRI PELVIS W/O & W/DYE: CPT | Mod: 26,MH

## 2022-06-13 PROCEDURE — 76498P: CUSTOM | Mod: 26,MH

## 2022-06-13 PROCEDURE — A9585: CPT

## 2022-06-13 PROCEDURE — 72197 MRI PELVIS W/O & W/DYE: CPT | Mod: MH

## 2022-06-13 PROCEDURE — 76498 UNLISTED MR PROCEDURE: CPT

## 2022-07-26 ENCOUNTER — OUTPATIENT (OUTPATIENT)
Dept: OUTPATIENT SERVICES | Facility: HOSPITAL | Age: 67
LOS: 1 days | End: 2022-07-26
Payer: MEDICARE

## 2022-07-26 ENCOUNTER — APPOINTMENT (OUTPATIENT)
Dept: CT IMAGING | Facility: IMAGING CENTER | Age: 67
End: 2022-07-26

## 2022-07-26 DIAGNOSIS — Z98.89 OTHER SPECIFIED POSTPROCEDURAL STATES: Chronic | ICD-10-CM

## 2022-07-26 DIAGNOSIS — C34.90 MALIGNANT NEOPLASM OF UNSPECIFIED PART OF UNSPECIFIED BRONCHUS OR LUNG: ICD-10-CM

## 2022-07-26 PROCEDURE — 71250 CT THORAX DX C-: CPT

## 2022-07-26 PROCEDURE — 71250 CT THORAX DX C-: CPT | Mod: 26,MH

## 2022-07-28 ENCOUNTER — APPOINTMENT (OUTPATIENT)
Dept: CARDIOLOGY | Facility: CLINIC | Age: 67
End: 2022-07-28

## 2022-07-28 ENCOUNTER — NON-APPOINTMENT (OUTPATIENT)
Age: 67
End: 2022-07-28

## 2022-07-28 VITALS
DIASTOLIC BLOOD PRESSURE: 68 MMHG | OXYGEN SATURATION: 96 % | WEIGHT: 211 LBS | BODY MASS INDEX: 33.91 KG/M2 | HEIGHT: 66 IN | SYSTOLIC BLOOD PRESSURE: 120 MMHG | HEART RATE: 67 BPM | TEMPERATURE: 97.7 F

## 2022-07-28 DIAGNOSIS — Z71.85 ENCOUNTER FOR IMMUNIZATION SAFETY COUNSELING: ICD-10-CM

## 2022-07-28 DIAGNOSIS — Z12.11 ENCOUNTER FOR SCREENING FOR MALIGNANT NEOPLASM OF COLON: ICD-10-CM

## 2022-07-28 DIAGNOSIS — Z00.00 ENCOUNTER FOR GENERAL ADULT MEDICAL EXAMINATION W/OUT ABNORMAL FINDINGS: ICD-10-CM

## 2022-07-28 PROCEDURE — 93000 ELECTROCARDIOGRAM COMPLETE: CPT

## 2022-07-28 PROCEDURE — 99214 OFFICE O/P EST MOD 30 MIN: CPT

## 2022-07-28 NOTE — HISTORY OF PRESENT ILLNESS
[FreeTextEntry1] : This is a 66 year old gentlemen with a PMH of adenocarcinoma of the lung and prostate, Obesity, DM2, CAD presents today for his annual exam \par \par Pt denies any CP, SOB, heart palpitations, dizziness, abdominal pain N/V/D fever or chills\par

## 2022-08-02 ENCOUNTER — APPOINTMENT (OUTPATIENT)
Dept: THORACIC SURGERY | Facility: CLINIC | Age: 67
End: 2022-08-02

## 2022-08-02 VITALS
HEART RATE: 60 BPM | HEIGHT: 66 IN | BODY MASS INDEX: 33.75 KG/M2 | DIASTOLIC BLOOD PRESSURE: 78 MMHG | RESPIRATION RATE: 16 BRPM | WEIGHT: 210 LBS | OXYGEN SATURATION: 99 % | SYSTOLIC BLOOD PRESSURE: 132 MMHG

## 2022-08-02 PROCEDURE — 99214 OFFICE O/P EST MOD 30 MIN: CPT

## 2022-08-02 RX ORDER — ALPRAZOLAM 0.5 MG/1
0.5 TABLET ORAL DAILY
Qty: 30 | Refills: 0 | Status: DISCONTINUED | COMMUNITY
Start: 2019-08-19 | End: 2022-08-02

## 2022-08-02 RX ORDER — ALBUTEROL SULFATE 90 UG/1
108 (90 BASE) AEROSOL, METERED RESPIRATORY (INHALATION)
Qty: 1 | Refills: 5 | Status: DISCONTINUED | COMMUNITY
Start: 2019-08-16 | End: 2022-08-02

## 2022-08-02 RX ORDER — ALBUTEROL SULFATE 90 UG/1
108 (90 BASE) INHALANT RESPIRATORY (INHALATION)
Qty: 1 | Refills: 1 | Status: DISCONTINUED | COMMUNITY
Start: 2022-01-27 | End: 2022-08-02

## 2022-08-02 RX ORDER — IRON POLYSACCHARIDE COMPLEX 150 MG
150 CAPSULE ORAL
Qty: 30 | Refills: 0 | Status: DISCONTINUED | COMMUNITY
Start: 2019-03-26 | End: 2022-08-02

## 2022-08-02 RX ORDER — FEXOFENADINE HYDROCHLORIDE 180 MG/1
180 TABLET ORAL
Qty: 30 | Refills: 0 | Status: DISCONTINUED | COMMUNITY
Start: 2020-04-02 | End: 2022-08-02

## 2022-08-02 NOTE — ASSESSMENT
[FreeTextEntry1] : 66 year old male S/P right VATS, RUL wedge resection x1 on 7/15/13 for a T1aNx minimally invasive adenocarcinoma. Because of the final pathology results a redo right VATS with completion RULobectomy and MLND was done performed on 8/16/13. There was no evidence of disease at that time. \par \par CT chest on 07/26/2022:\par - Status post right upper lobectomy. New sub-3 mm left lung nodule on image 45 series 2. There is centrilobular emphysema.\par \par I have reviewed the patient's medical records and diagnostic images at time of this office consultation and have made the following recommendation:\par 1. CT chest reviewed and explained to patient, new 3 mm left lung nodule,  I recommended patient to return to office in 4 -6 months with CT Chest without contrast. \par 2. F/u with Cardiologist for left chest pain. \par \par I personally performed the services described in the documentation, reviewed the documentation recorded by the scribe in my presence and it accurately and completely records my words and actions.\par \par ARLEEN, Antonia Pierson NP, am scribing for and the presence of VERNA López, the following sections HISTORY OF PRESENT ILLNESS, PAST MEDICAL/FAMILY/SOCIAL HISTORY; REVIEW OF SYSTEMS; VITAL SIGNS; PHYSICAL EXAM; DISPOSITION.

## 2022-08-02 NOTE — HISTORY OF PRESENT ILLNESS
[FreeTextEntry1] : 66 year old male S/P right VATS, RUL wedge resection x1 on 7/15/13 for a T1aNx minimally invasive adenocarcinoma. Because of the final pathology results a redo right VATS with completion RULobectomy and MLND was done performed on 8/16/13. There was no evidence of disease at that time. \par \par CT Scan of the Chest 7/25/2018: \par - Stable with no evidence of disease.\par \par Chest CT on 7/16/19:\par - RUL resection with stable postsurgical changes\par - Lungs otherwise clear\par - No enlarged mediastinal , hilar or axillary lymph nodes\par \par CT chest scan 7/1/2020:\par -No pulmonary nodules are noted. \par \par CT Chest on 7/28/21:\par - Stable exam\par \par CT chest on 07/26/2022:\par - Status post right upper lobectomy. New sub-3 mm left lung nodule on image 45 series 2. There is centrilobular emphysema.\par \par Patient presents to office for follow up. Patient c/o SOB on exertion, c/o intermittent chest pain on and off since 04/2022, was seen by his cardiologist but did not mention this new findings. denies cough, fever, chills.

## 2022-08-04 ENCOUNTER — TRANSCRIPTION ENCOUNTER (OUTPATIENT)
Age: 67
End: 2022-08-04

## 2022-08-09 ENCOUNTER — TRANSCRIPTION ENCOUNTER (OUTPATIENT)
Age: 67
End: 2022-08-09

## 2022-08-09 ENCOUNTER — RX RENEWAL (OUTPATIENT)
Age: 67
End: 2022-08-09

## 2022-09-06 ENCOUNTER — TRANSCRIPTION ENCOUNTER (OUTPATIENT)
Age: 67
End: 2022-09-06

## 2022-09-12 ENCOUNTER — APPOINTMENT (OUTPATIENT)
Age: 67
End: 2022-09-12

## 2022-09-12 ENCOUNTER — APPOINTMENT (OUTPATIENT)
Dept: PULMONOLOGY | Facility: CLINIC | Age: 67
End: 2022-09-12

## 2022-09-12 ENCOUNTER — NON-APPOINTMENT (OUTPATIENT)
Age: 67
End: 2022-09-12

## 2022-09-12 VITALS
HEART RATE: 69 BPM | SYSTOLIC BLOOD PRESSURE: 128 MMHG | DIASTOLIC BLOOD PRESSURE: 82 MMHG | TEMPERATURE: 97.3 F | OXYGEN SATURATION: 95 %

## 2022-09-12 PROCEDURE — 94010 BREATHING CAPACITY TEST: CPT

## 2022-09-12 PROCEDURE — G0008: CPT

## 2022-09-12 PROCEDURE — 94729 DIFFUSING CAPACITY: CPT

## 2022-09-12 PROCEDURE — 94727 GAS DIL/WSHOT DETER LNG VOL: CPT

## 2022-09-12 PROCEDURE — ZZZZZ: CPT

## 2022-09-12 PROCEDURE — 90662 IIV NO PRSV INCREASED AG IM: CPT

## 2022-09-12 PROCEDURE — 99214 OFFICE O/P EST MOD 30 MIN: CPT | Mod: 25

## 2022-09-13 NOTE — ASSESSMENT
[FreeTextEntry1] : Urged compliance to CPAP.\par singular daily \par Continue Symbicort 160\par PRN beta agonist\par Wt. loss/exercise\par F/U CT 1 year. July 2022\par Sleep hygiene discussed at length.\par Options for sleep medications as well discussed.  Have chosen trial of trazodone.  We will start at 25 mg and increase to 50 as needed.\par Follow-up in 1 month.\par \par 35 minutes spent in evaluation management and discussion\par \par

## 2022-09-13 NOTE — HISTORY OF PRESENT ILLNESS
[Former] : former [TextBox_4] : On Singulair, only takes pro air rarely \par no  cough. mild sob with stairs, no exercise\par \par Using interm. CPAP\par Not sleeping well\par Mother passed away. \par \par Difficulty both falling asleep and staying asleep\par Is falling asleep during the day.  [TextBox_11] : 1 [TextBox_13] : 38 [YearQuit] : 2013

## 2022-09-13 NOTE — PROCEDURE
[FreeTextEntry1] : 09/12/2022\par Pulmonary function testing\par These data demonstrate a moderate obstructive ventilatory deficit. There is elevation in the RV/TLC ratio indicative of possible air trapping. There is a moderate diffusion impairment. Corrects to normal with lung volume correction \par There is very mild decrement in function compared to March 2022.\par \par Cpap data downloaded and discussed with patient.\par \par

## 2022-09-13 NOTE — DISCUSSION/SUMMARY
[FreeTextEntry1] : Status post resection of lung carcinoma.  Clinically stable.\par TAYA on CPAP compliance needs to be improved.\par COPD clinically and functionally stable. \par Overweight\par Insomnia likely related to use stress and home issues.  Poor sleep hygiene.\par \par \par \par \par

## 2022-10-14 ENCOUNTER — TRANSCRIPTION ENCOUNTER (OUTPATIENT)
Age: 67
End: 2022-10-14

## 2022-10-16 ENCOUNTER — TRANSCRIPTION ENCOUNTER (OUTPATIENT)
Age: 67
End: 2022-10-16

## 2022-10-17 ENCOUNTER — TRANSCRIPTION ENCOUNTER (OUTPATIENT)
Age: 67
End: 2022-10-17

## 2022-10-21 ENCOUNTER — APPOINTMENT (OUTPATIENT)
Dept: PULMONOLOGY | Facility: CLINIC | Age: 67
End: 2022-10-21

## 2022-10-25 ENCOUNTER — APPOINTMENT (OUTPATIENT)
Dept: PULMONOLOGY | Facility: CLINIC | Age: 67
End: 2022-10-25

## 2022-10-25 ENCOUNTER — NON-APPOINTMENT (OUTPATIENT)
Age: 67
End: 2022-10-25

## 2022-10-25 VITALS — SYSTOLIC BLOOD PRESSURE: 144 MMHG | OXYGEN SATURATION: 95 % | DIASTOLIC BLOOD PRESSURE: 66 MMHG | HEART RATE: 56 BPM

## 2022-10-25 DIAGNOSIS — J06.9 ACUTE UPPER RESPIRATORY INFECTION, UNSPECIFIED: ICD-10-CM

## 2022-10-25 DIAGNOSIS — R09.82 POSTNASAL DRIP: ICD-10-CM

## 2022-10-25 DIAGNOSIS — R05.9 COUGH, UNSPECIFIED: ICD-10-CM

## 2022-10-25 PROCEDURE — 99214 OFFICE O/P EST MOD 30 MIN: CPT | Mod: 25

## 2022-10-25 PROCEDURE — 71046 X-RAY EXAM CHEST 2 VIEWS: CPT

## 2022-10-25 RX ORDER — FLUTICASONE PROPIONATE 50 UG/1
50 SPRAY, METERED NASAL
Qty: 1 | Refills: 5 | Status: ACTIVE | COMMUNITY
Start: 2022-10-25 | End: 1900-01-01

## 2022-10-26 NOTE — DISCUSSION/SUMMARY
[FreeTextEntry1] : Cough and nasal congestion likely secondary to URI and postnasal drip, rule out viral syndrome.

## 2022-10-26 NOTE — PHYSICAL EXAM
[No Acute Distress] : no acute distress [Normal Oropharynx] : normal oropharynx [No Neck Mass] : no neck mass [Normal Appearance] : normal appearance [Normal Rate/Rhythm] : normal rate/rhythm [Normal S1, S2] : normal s1, s2 [No Murmurs] : no murmurs [No Resp Distress] : no resp distress [Clear to Auscultation Bilaterally] : clear to auscultation bilaterally [No Abnormalities] : no abnormalities [Benign] : benign [Normal Gait] : normal gait [No Clubbing] : no clubbing [No Edema] : no edema [No Cyanosis] : no cyanosis [FROM] : FROM [Normal Color/ Pigmentation] : normal color/ pigmentation [No Focal Deficits] : no focal deficits [Oriented x3] : oriented x3 [Normal Affect] : normal affect

## 2022-10-26 NOTE — ASSESSMENT
[FreeTextEntry1] : Checked nasopharyngeal RVP.  \par Advised him on well hydration and rest.  \par Start Z-Nima and prednisone taper.\par Start Flonase nasal spray.

## 2022-10-26 NOTE — REVIEW OF SYSTEMS
[Cough] : cough [Wheezing] : wheezing [Negative] : Endocrine [TextBox_14] : runny nose  [TextBox_30] : See HPI

## 2022-10-26 NOTE — HISTORY OF PRESENT ILLNESS
[TextBox_4] : Started feeling sick 8 days ago, with cough/nasal congestion/wheezes/body ache, no chills

## 2022-10-27 ENCOUNTER — APPOINTMENT (OUTPATIENT)
Dept: ENDOCRINOLOGY | Facility: CLINIC | Age: 67
End: 2022-10-27

## 2022-10-27 VITALS
OXYGEN SATURATION: 97 % | SYSTOLIC BLOOD PRESSURE: 122 MMHG | DIASTOLIC BLOOD PRESSURE: 68 MMHG | TEMPERATURE: 98.6 F | BODY MASS INDEX: 34.54 KG/M2 | HEART RATE: 72 BPM | RESPIRATION RATE: 15 BRPM | WEIGHT: 214 LBS

## 2022-10-27 LAB
GLUCOSE BLDC GLUCOMTR-MCNC: 160
HBA1C MFR BLD HPLC: 6.7

## 2022-10-27 PROCEDURE — 83036 HEMOGLOBIN GLYCOSYLATED A1C: CPT | Mod: QW

## 2022-10-27 PROCEDURE — 82962 GLUCOSE BLOOD TEST: CPT

## 2022-10-27 PROCEDURE — 99214 OFFICE O/P EST MOD 30 MIN: CPT

## 2022-10-28 LAB
CREAT SPEC-SCNC: 230 MG/DL
MICROALBUMIN 24H UR DL<=1MG/L-MCNC: <1.2 MG/DL
MICROALBUMIN/CREAT 24H UR-RTO: NORMAL MG/G
RAPID RVP RESULT: NOT DETECTED
SARS-COV-2 RNA PNL RESP NAA+PROBE: NOT DETECTED

## 2022-10-30 NOTE — HISTORY OF PRESENT ILLNESS
[FreeTextEntry1] : Mr. WALLS  is a 67 year old  male who  returns today in follow up with regard to a history of type 2 diabetes mellitus. The diabetes mellitus hs been present for at lest 3-4 years if not longer. \par \par There is no known history of retinopathy, nephropathy.With regard to neuropathy, he does note occasional tingling in this feet. On no medication for the dm. \par HGM of late  has not been tested. \par \par .There has been no significant hypoglycemia.  denies any chest pain, sob, neurologic or ophthalmologic complaints. He  too denies any new podiatric concerns. He  is up to date with his ophthalmologic visit.UTD with  with optho-no retinopathy.\par \par POCT A1c returned today at 6.7% ; previously\par POCT glucose returned today at 160  mg/dL \par A1c 7/28/2022 returned at 7.0 %.\par \par Additional medical history includes that of Prostate Ca dx 8/14-low grade. Too with cad hx 9 stents 2 more placed last February, hyperlipidemia, Minor mi, htn and COPD and hx of adenocarcinoma lung 2013 s/p rt upper lobectomy., vitamin d deficiency\par \par Meds:\par \par

## 2022-11-20 ENCOUNTER — TRANSCRIPTION ENCOUNTER (OUTPATIENT)
Age: 67
End: 2022-11-20

## 2022-11-29 ENCOUNTER — APPOINTMENT (OUTPATIENT)
Dept: CT IMAGING | Facility: IMAGING CENTER | Age: 67
End: 2022-11-29

## 2022-11-29 ENCOUNTER — OUTPATIENT (OUTPATIENT)
Dept: OUTPATIENT SERVICES | Facility: HOSPITAL | Age: 67
LOS: 1 days | End: 2022-11-29
Payer: MEDICARE

## 2022-11-29 DIAGNOSIS — Z98.89 OTHER SPECIFIED POSTPROCEDURAL STATES: Chronic | ICD-10-CM

## 2022-11-29 DIAGNOSIS — C34.90 MALIGNANT NEOPLASM OF UNSPECIFIED PART OF UNSPECIFIED BRONCHUS OR LUNG: ICD-10-CM

## 2022-11-29 PROCEDURE — 71250 CT THORAX DX C-: CPT | Mod: 26,MH

## 2022-11-29 PROCEDURE — 71250 CT THORAX DX C-: CPT

## 2022-12-13 ENCOUNTER — APPOINTMENT (OUTPATIENT)
Dept: THORACIC SURGERY | Facility: CLINIC | Age: 67
End: 2022-12-13

## 2022-12-13 VITALS
OXYGEN SATURATION: 96 % | WEIGHT: 208 LBS | BODY MASS INDEX: 33.43 KG/M2 | HEIGHT: 66 IN | HEART RATE: 89 BPM | DIASTOLIC BLOOD PRESSURE: 86 MMHG | SYSTOLIC BLOOD PRESSURE: 131 MMHG | RESPIRATION RATE: 16 BRPM

## 2022-12-13 PROCEDURE — 99214 OFFICE O/P EST MOD 30 MIN: CPT

## 2022-12-16 ENCOUNTER — TRANSCRIPTION ENCOUNTER (OUTPATIENT)
Age: 67
End: 2022-12-16

## 2022-12-16 NOTE — HISTORY OF PRESENT ILLNESS
[FreeTextEntry1] : 67 year old male S/P right VATS, RUL wedge resection x1 on 7/15/13 for a T1aNx minimally invasive adenocarcinoma. Because of the final pathology results a redo right VATS with completion RULobectomy and MLND was done performed on 8/16/13. There was no evidence of disease at that time. \par \par CT Scan of the Chest 7/25/2018: \par - Stable with no evidence of disease.\par \par Chest CT on 7/16/19:\par - RUL resection with stable postsurgical changes\par - Lungs otherwise clear\par - No enlarged mediastinal , hilar or axillary lymph nodes\par \par CT chest scan 7/1/2020:\par -No pulmonary nodules are noted. \par \par CT Chest on 7/28/21:\par - Stable exam\par \par CT chest on 07/26/2022:\par - Status post right upper lobectomy. New sub-3 mm left lung nodule on image 45 series 2. There is centrilobular emphysema.\par \par CT Chest 11/29/22:\par - Since 7/26/2022, the 3 mm left upper lobe nodule has resolved. A calcified right lower lobe nodule an 2 mm left upper lobe nodule are unchanged.\par \par Patient presents to office for 4 month follow up. Overall, he reports to be feeling well. Denies any chest pain, shortness of breath, cough, or hemoptysis.

## 2022-12-16 NOTE — DATA REVIEWED
" LOS: 3 days   Patient Care Team:  Treasure Dickinson APRN as PCP - General (Nurse Practitioner)    Chief Complaint: post op fu    Subjective:  Symptoms:  She reports shortness of breath (mild).  No chest pain.    Diet:  No nausea.    Activity level: Impaired due to pain.    Pain:  Pain is requiring pain medication and partially controlled.          Vital Signs  Temp:  [97.8 °F (36.6 °C)-99.2 °F (37.3 °C)] 99.2 °F (37.3 °C)  Heart Rate:  [] 89  Resp:  [15-25] 24  BP: (108-135)/(47-62) 110/50  Body mass index is 33.41 kg/m².    Intake/Output Summary (Last 24 hours) at 12/16/2021 1106  Last data filed at 12/16/2021 0953  Gross per 24 hour   Intake 1489 ml   Output 1665 ml   Net -176 ml     I/O this shift:  In: 480 [P.O.:480]  Out: -     Chest tube drainage last 8 hours:  130 (no air leak)        12/15/21  0553 12/16/21  0439 12/16/21  0608   Weight: 92.9 kg (204 lb 12.9 oz) 92.6 kg (204 lb 2.3 oz) 91.1 kg (200 lb 12.8 oz)         Objective:  General Appearance:  Comfortable.    Vital signs: (most recent): Blood pressure 110/50, pulse 89, temperature 99.2 °F (37.3 °C), temperature source Oral, resp. rate 24, height 165.1 cm (65\"), weight 91.1 kg (200 lb 12.8 oz), SpO2 94 %, not currently breastfeeding.    Output: Producing urine.    Lungs:  Normal effort and normal respiratory rate.  Breath sounds clear to auscultation.  (O2 at 2 liters)  Heart: Normal rate.  Regular rhythm.  S1 normal and S2 normal.  (NSR, rate 90's)  Abdomen: Abdomen is soft and non-distended.    Extremities: There is dependent edema (trace pretibial).    Neurological: Patient is alert and oriented to person, place and time.    Skin:  Warm and dry.  (Sternal and leg dressings intact without drainage)            Results Review:        WBC WBC   Date Value Ref Range Status   12/16/2021 11.00 (H) 3.40 - 10.80 10*3/mm3 Final   12/15/2021 10.80 3.40 - 10.80 10*3/mm3 Final   12/14/2021 11.20 (H) 3.40 - 10.80 10*3/mm3 Final   12/14/2021 12.20 (H) 3.40 " - 10.80 10*3/mm3 Final   12/13/2021 9.90 3.40 - 10.80 10*3/mm3 Final      HGB Hemoglobin   Date Value Ref Range Status   12/16/2021 8.7 (L) 12.0 - 15.9 g/dL Final   12/15/2021 9.8 (L) 12.0 - 15.9 g/dL Final   12/15/2021 9.2 (L) 12.0 - 17.0 g/dL Final   12/14/2021 10.1 (L) 12.0 - 17.0 g/dL Final   12/14/2021 10.7 (L) 12.0 - 17.0 g/dL Final   12/14/2021 10.2 (L) 12.0 - 17.0 g/dL Final   12/14/2021 9.5 (L) 12.0 - 15.9 g/dL Final   12/14/2021 8.5 (L) 12.0 - 17.0 g/dL Final   12/14/2021 8.5 (L) 12.0 - 17.0 g/dL Final   12/14/2021 8.8 (L) 12.0 - 17.0 g/dL Final   12/14/2021 8.2 (L) 12.0 - 17.0 g/dL Final   12/14/2021 11.2 (L) 12.0 - 17.0 g/dL Final   12/14/2021 12.4 12.0 - 15.9 g/dL Final   12/13/2021 12.0 12.0 - 15.9 g/dL Final      HCT Hematocrit   Date Value Ref Range Status   12/16/2021 25.9 (L) 34.0 - 46.6 % Final   12/15/2021 28.6 (L) 34.0 - 46.6 % Final   12/15/2021 27 (L) 38 - 51 % Final   12/14/2021 30 (L) 38 - 51 % Final   12/14/2021 32 (L) 38 - 51 % Final   12/14/2021 30 (L) 38 - 51 % Final   12/14/2021 28.4 (L) 34.0 - 46.6 % Final   12/14/2021 25 (L) 38 - 51 % Final   12/14/2021 25 (L) 38 - 51 % Final   12/14/2021 26 (L) 38 - 51 % Final   12/14/2021 24 (L) 38 - 51 % Final   12/14/2021 33 (L) 38 - 51 % Final   12/14/2021 37.3 34.0 - 46.6 % Final   12/13/2021 35.6 34.0 - 46.6 % Final      Platelets Platelets   Date Value Ref Range Status   12/16/2021 228 140 - 450 10*3/mm3 Final   12/15/2021 260 140 - 450 10*3/mm3 Final   12/14/2021 223 140 - 450 10*3/mm3 Final   12/14/2021 353 140 - 450 10*3/mm3 Final   12/13/2021 341 140 - 450 10*3/mm3 Final        PT/INR:    Protime   Date Value Ref Range Status   12/15/2021 11.3 9.6 - 11.7 Seconds Final   12/14/2021 12.1 (H) 9.6 - 11.7 Seconds Final   12/14/2021 11.1 9.6 - 11.7 Seconds Final   12/13/2021 10.8 9.6 - 11.7 Seconds Final   /  INR   Date Value Ref Range Status   12/15/2021 1.02 0.93 - 1.10 Final   12/14/2021 1.10 0.93 - 1.10 Final   12/14/2021 1.00 0.93 - 1.10  [FreeTextEntry1] : I independently reviewed the CT Chest 11/29/22 Final   12/13/2021 0.97 0.93 - 1.10 Final       Sodium Sodium   Date Value Ref Range Status   12/16/2021 140 136 - 145 mmol/L Final   12/15/2021 145 136 - 145 mmol/L Final   12/14/2021 142 136 - 145 mmol/L Final   12/14/2021 142 136 - 145 mmol/L Final   12/14/2021 139 136 - 145 mmol/L Final      Potassium Potassium   Date Value Ref Range Status   12/16/2021 3.8 3.5 - 5.2 mmol/L Final   12/15/2021 4.3 3.5 - 5.2 mmol/L Final   12/14/2021 4.1 3.5 - 5.2 mmol/L Final   12/14/2021 4.2 3.5 - 5.2 mmol/L Final   12/14/2021 4.2 3.5 - 5.2 mmol/L Final      Chloride Chloride   Date Value Ref Range Status   12/16/2021 104 98 - 107 mmol/L Final   12/15/2021 111 (H) 98 - 107 mmol/L Final   12/14/2021 107 98 - 107 mmol/L Final   12/14/2021 108 (H) 98 - 107 mmol/L Final   12/14/2021 102 98 - 107 mmol/L Final      Bicarbonate CO2   Date Value Ref Range Status   12/16/2021 22.0 22.0 - 29.0 mmol/L Final   12/15/2021 20.0 (L) 22.0 - 29.0 mmol/L Final   12/14/2021 22.0 22.0 - 29.0 mmol/L Final   12/14/2021 22.0 22.0 - 29.0 mmol/L Final   12/14/2021 24.0 22.0 - 29.0 mmol/L Final      BUN BUN   Date Value Ref Range Status   12/16/2021 21 (H) 6 - 20 mg/dL Final   12/15/2021 19 6 - 20 mg/dL Final   12/14/2021 18 6 - 20 mg/dL Final   12/14/2021 17 6 - 20 mg/dL Final   12/14/2021 20 6 - 20 mg/dL Final      Creatinine Creatinine   Date Value Ref Range Status   12/16/2021 0.79 0.57 - 1.00 mg/dL Final   12/15/2021 0.75 0.57 - 1.00 mg/dL Final   12/14/2021 0.73 0.57 - 1.00 mg/dL Final   12/14/2021 0.70 0.57 - 1.00 mg/dL Final   12/14/2021 0.58 0.57 - 1.00 mg/dL Final      Calcium Calcium   Date Value Ref Range Status   12/16/2021 9.4 8.6 - 10.5 mg/dL Final   12/15/2021 9.5 8.6 - 10.5 mg/dL Final   12/14/2021 9.7 8.6 - 10.5 mg/dL Final   12/14/2021 9.7 8.6 - 10.5 mg/dL Final   12/14/2021 9.0 8.6 - 10.5 mg/dL Final      Magnesium Magnesium   Date Value Ref Range Status   12/14/2021 2.2 1.6 - 2.6 mg/dL Final   12/14/2021 2.4 1.6 - 2.6 mg/dL Final       Troponin No results found for: TROPONIN   BNP No results found for: BNP         aspirin, 81 mg, Oral, Daily  atorvastatin, 40 mg, Oral, Nightly  ceFAZolin, 2 g, Intravenous, Q8H  chlorhexidine, 15 mL, Mouth/Throat, Q12H  enoxaparin, 40 mg, Subcutaneous, Daily  mupirocin, , Each Nare, BID  pantoprazole, 40 mg, Oral, QAM  senna-docusate sodium, 2 tablet, Oral, BID  sodium chloride, 10 mL, Intravenous, Q12H      insulin, 0-50 Units/hr, Last Rate: Stopped (12/16/21 0410)  norepinephrine, 0.02-0.3 mcg/kg/min, Last Rate: Stopped (12/15/21 1131)  sodium chloride, 30 mL/hr, Last Rate: 30 mL/hr (12/15/21 5968)        PRN Meds:.•  acetaminophen **OR** acetaminophen **OR** acetaminophen  •  ALPRAZolam  •  bisacodyl  •  bisacodyl  •  cyclobenzaprine  •  HYDROcodone-acetaminophen  •  HYDROcodone-acetaminophen  •  insulin  •  magnesium hydroxide  •  magnesium sulfate **OR** magnesium sulfate **OR** magnesium sulfate  •  Morphine **FOLLOWED BY** [START ON 12/21/2021] naloxone  •  Morphine **AND** naloxone  •  norepinephrine  •  ondansetron  •  oxyCODONE  •  polyethylene glycol  •  potassium chloride **OR** potassium chloride  •  sodium chloride  •  sodium chloride  •  sodium chloride    Patient Active Problem List   Diagnosis Code   • CAD, multiple vessel I25.10       Assessment & Plan      · Severe 3 vessel CAD s/p CABG x4 with LIMA----POD#2 ([facility])  · NSTEMI----will start Plavix prior to d/c  · Normal LV systolic function---EF 50-55%  · HTN---hypotensive post op, off pressors  · HLD  · Non-insulin dependent DM   · Asthma  · Obesity---BMI 32.95  · Social situation---- begins chemo for throat cancer today, 14 year old son    Hypoglycemic with 0.4 units/hr insulin, change to sliding scale.  Start lopressor 12.5 bid, lasix 20 mg daily, replace K+.  D/c chest tubes, cordis, yanes.  De-escalate.  Mild nausea with activity this am, will check orthostatics and re-check Hb if continues.     BJ Oakley  12/16/21  11:06  EST

## 2022-12-16 NOTE — ASSESSMENT
[FreeTextEntry1] : 67 year old male S/P right VATS, RUL wedge resection x1 on 7/15/13 for a T1aNx minimally invasive adenocarcinoma. Because of the final pathology results a redo right VATS with completion RULobectomy and MLND was done performed on 8/16/13. There was no evidence of disease at that time. \par \par \par I have reviewed the patient's medical records and diagnostic images at time of this office consultation and have made the following recommendation:\par 1. CT reviewed with patient, stable findings. I recommend return to office in 6 months with CT chest without contrast.\par 2. Continue follow up with pulm and PCP.\par \par \par I, BRIAN LópezIM, personally performed the evaluation and management (E/M) services for this established patient. That E/M includes conducting the examination, assessing all new/exacerbated conditions, and establishing a new plan of care. Today, my ACP, Aric Mckeon/ Susie Liu NP were here to observe my evaluation and management services for this new problem/exacerbated condition to be followed going forward.\par \par  \par

## 2023-01-05 ENCOUNTER — APPOINTMENT (OUTPATIENT)
Dept: ENDOCRINOLOGY | Facility: CLINIC | Age: 68
End: 2023-01-05
Payer: MEDICARE

## 2023-01-05 VITALS
SYSTOLIC BLOOD PRESSURE: 140 MMHG | DIASTOLIC BLOOD PRESSURE: 80 MMHG | BODY MASS INDEX: 33.91 KG/M2 | TEMPERATURE: 98 F | WEIGHT: 211 LBS | HEIGHT: 66 IN | HEART RATE: 78 BPM | OXYGEN SATURATION: 97 %

## 2023-01-05 LAB
GLUCOSE BLDC GLUCOMTR-MCNC: 147
HBA1C MFR BLD HPLC: 7

## 2023-01-05 PROCEDURE — 82962 GLUCOSE BLOOD TEST: CPT

## 2023-01-05 PROCEDURE — 83036 HEMOGLOBIN GLYCOSYLATED A1C: CPT | Mod: QW

## 2023-01-05 PROCEDURE — 36415 COLL VENOUS BLD VENIPUNCTURE: CPT

## 2023-01-05 PROCEDURE — 99214 OFFICE O/P EST MOD 30 MIN: CPT | Mod: 25

## 2023-01-06 LAB
25(OH)D3 SERPL-MCNC: 70.7 NG/ML
ALBUMIN SERPL ELPH-MCNC: 4 G/DL
ALP BLD-CCNC: 71 U/L
ALT SERPL-CCNC: 17 U/L
ANION GAP SERPL CALC-SCNC: 14 MMOL/L
AST SERPL-CCNC: 23 U/L
BASOPHILS # BLD AUTO: 0.03 K/UL
BASOPHILS NFR BLD AUTO: 0.4 %
BILIRUB SERPL-MCNC: 0.4 MG/DL
BUN SERPL-MCNC: 13 MG/DL
CALCIUM SERPL-MCNC: 9.6 MG/DL
CHLORIDE SERPL-SCNC: 103 MMOL/L
CHOLEST SERPL-MCNC: 161 MG/DL
CO2 SERPL-SCNC: 22 MMOL/L
CREAT SERPL-MCNC: 1.12 MG/DL
CREAT SPEC-SCNC: 191 MG/DL
EGFR: 72 ML/MIN/1.73M2
EOSINOPHIL # BLD AUTO: 0.19 K/UL
EOSINOPHIL NFR BLD AUTO: 2.6 %
ESTIMATED AVERAGE GLUCOSE: 160 MG/DL
FERRITIN SERPL-MCNC: 227 NG/ML
FOLATE SERPL-MCNC: >20 NG/ML
FRUCTOSAMINE SERPL-MCNC: 239 UMOL/L
GLUCOSE SERPL-MCNC: 184 MG/DL
GLYCOMARK.: 20.5 UG/ML
HBA1C MFR BLD HPLC: 7.2 %
HCT VFR BLD CALC: 40.6 %
HDLC SERPL-MCNC: 53 MG/DL
HGB BLD-MCNC: 12.1 G/DL
IMM GRANULOCYTES NFR BLD AUTO: 0.1 %
IRON SERPL-MCNC: 65 UG/DL
LDLC SERPL DIRECT ASSAY-MCNC: 71 MG/DL
LYMPHOCYTES # BLD AUTO: 2.49 K/UL
LYMPHOCYTES NFR BLD AUTO: 34.1 %
MAN DIFF?: NORMAL
MCHC RBC-ENTMCNC: 26.2 PG
MCHC RBC-ENTMCNC: 29.8 GM/DL
MCV RBC AUTO: 88.1 FL
MICROALBUMIN 24H UR DL<=1MG/L-MCNC: <1.2 MG/DL
MICROALBUMIN/CREAT 24H UR-RTO: NORMAL MG/G
MONOCYTES # BLD AUTO: 0.68 K/UL
MONOCYTES NFR BLD AUTO: 9.3 %
NEUTROPHILS # BLD AUTO: 3.91 K/UL
NEUTROPHILS NFR BLD AUTO: 53.5 %
PLATELET # BLD AUTO: 272 K/UL
POTASSIUM SERPL-SCNC: 4 MMOL/L
PROT SERPL-MCNC: 6.8 G/DL
RBC # BLD: 4.61 M/UL
RBC # FLD: 14.6 %
SODIUM SERPL-SCNC: 139 MMOL/L
T3FREE SERPL-MCNC: 2.81 PG/ML
T4 FREE SERPL-MCNC: 1.1 NG/DL
TRIGL SERPL-MCNC: 127 MG/DL
TSH SERPL-ACNC: 1.52 UIU/ML
VIT B12 SERPL-MCNC: 668 PG/ML
WBC # FLD AUTO: 7.31 K/UL

## 2023-01-08 NOTE — HISTORY OF PRESENT ILLNESS
[FreeTextEntry1] : Mr. WALLS  is a 67 year  old  male who  returns today in follow up with regard to a history of type 2 diabetes mellitus. The diabetes mellitus hs been present for at at least four years.\par \par There is no known history of retinopathy, nephropathy. He  too denies any history of neuropathy. He was started on Ozempic at last visit on 10/27/22, but he has been off for the past month because he was not able to obtain refill. Tolerated the medication well.\par \par While on Ozempic he notes that his weight went down to 202 lbs, where it was previously 214 lbs on 10/27/22. Current weight: 211 lbs. \par \par Patient has not been carrying out home glucose monitoring of late. \par \par There has been no significant hypoglycemia.  denies any chest pain, sob, neurologic or ophthalmologic complaints. He  too denies any new podiatric concerns. He  is up to date with his ophthalmologic visit. Notes some itching and some numbness in hands bilaterally. UTD with  with optho-no retinopathy.\par \par POCT A1c returned today at  7.0%; previously 6.7% on 10/27/22 \par POCT glucose returned today at 147 mg/dL \par \par Additional medical history includes that of Prostate Ca dx 8/14-low grade Too with cad hx 9 stents 2 more placed last February, hyperlipidemia, Minor mi, htn and COPD and hx of adenocarcinoma lung 2013 s/p rt upper lobectomy., vitamin d deficiency\par \par Meds: Losartan 100 mg, Atorvastatin 40 mg, Metoprolol 50 mg, vitamin D 2,000 IU daily

## 2023-01-11 ENCOUNTER — TRANSCRIPTION ENCOUNTER (OUTPATIENT)
Age: 68
End: 2023-01-11

## 2023-01-15 ENCOUNTER — TRANSCRIPTION ENCOUNTER (OUTPATIENT)
Age: 68
End: 2023-01-15

## 2023-01-22 ENCOUNTER — TRANSCRIPTION ENCOUNTER (OUTPATIENT)
Age: 68
End: 2023-01-22

## 2023-01-30 ENCOUNTER — NON-APPOINTMENT (OUTPATIENT)
Age: 68
End: 2023-01-30

## 2023-01-30 ENCOUNTER — APPOINTMENT (OUTPATIENT)
Dept: CARDIOLOGY | Facility: CLINIC | Age: 68
End: 2023-01-30
Payer: MEDICARE

## 2023-01-30 VITALS
OXYGEN SATURATION: 97 % | HEART RATE: 99 BPM | HEIGHT: 68 IN | SYSTOLIC BLOOD PRESSURE: 136 MMHG | TEMPERATURE: 98.1 F | BODY MASS INDEX: 30.31 KG/M2 | WEIGHT: 200 LBS | DIASTOLIC BLOOD PRESSURE: 72 MMHG

## 2023-01-30 DIAGNOSIS — E11.49 TYPE 2 DIABETES MELLITUS WITH OTHER DIABETIC NEUROLOGICAL COMPLICATION: ICD-10-CM

## 2023-01-30 PROCEDURE — 99214 OFFICE O/P EST MOD 30 MIN: CPT

## 2023-01-30 PROCEDURE — 93000 ELECTROCARDIOGRAM COMPLETE: CPT

## 2023-01-30 RX ORDER — LANCETS 33 GAUGE
EACH MISCELLANEOUS
Qty: 1 | Refills: 3 | Status: ACTIVE | COMMUNITY
Start: 2020-02-10 | End: 1900-01-01

## 2023-01-30 RX ORDER — BLOOD SUGAR DIAGNOSTIC
STRIP MISCELLANEOUS DAILY
Qty: 1 | Refills: 3 | Status: ACTIVE | COMMUNITY
Start: 2020-02-10 | End: 1900-01-01

## 2023-01-30 NOTE — HISTORY OF PRESENT ILLNESS
[FreeTextEntry1] : This is a 67 year old gentlemen with a PMH of adenocarcinoma of the lung and prostate, Obesity, DM2, CAD presents today for routine follow up. pt report sfeeling well denies any complaints \par \par Pt denies any CP, SOB, heart palpitations, dizziness, abdominal pain N/V/D fever or chills

## 2023-03-13 ENCOUNTER — RX RENEWAL (OUTPATIENT)
Age: 68
End: 2023-03-13

## 2023-05-11 ENCOUNTER — APPOINTMENT (OUTPATIENT)
Dept: ENDOCRINOLOGY | Facility: CLINIC | Age: 68
End: 2023-05-11
Payer: MEDICARE

## 2023-05-11 VITALS
WEIGHT: 193 LBS | HEIGHT: 68 IN | SYSTOLIC BLOOD PRESSURE: 125 MMHG | TEMPERATURE: 98 F | OXYGEN SATURATION: 96 % | DIASTOLIC BLOOD PRESSURE: 80 MMHG | BODY MASS INDEX: 29.25 KG/M2 | HEART RATE: 68 BPM

## 2023-05-11 LAB
GLUCOSE BLDC GLUCOMTR-MCNC: 105
HBA1C MFR BLD HPLC: 6

## 2023-05-11 PROCEDURE — 36415 COLL VENOUS BLD VENIPUNCTURE: CPT

## 2023-05-11 PROCEDURE — 99214 OFFICE O/P EST MOD 30 MIN: CPT | Mod: 25

## 2023-05-11 PROCEDURE — 83036 HEMOGLOBIN GLYCOSYLATED A1C: CPT | Mod: QW

## 2023-05-11 PROCEDURE — 82962 GLUCOSE BLOOD TEST: CPT

## 2023-05-12 LAB
CREAT SPEC-SCNC: 255 MG/DL
MICROALBUMIN 24H UR DL<=1MG/L-MCNC: <1.2 MG/DL
MICROALBUMIN/CREAT 24H UR-RTO: NORMAL MG/G

## 2023-05-17 NOTE — HISTORY OF PRESENT ILLNESS
[FreeTextEntry1] : Mr. WALLS  is a 67 year old male who  returns today in follow up with regard to a history of type 2 diabetes mellitus. The diabetes mellitus has been present for over four years.\par \par There is no known history of retinopathy, nephropathy. He  too denies any history of neuropathy. \par Started on Ozempic October 2022.  Continues on 0.5 mg sc weekly. Tolerating well. \par He has lost  21 lbs since October 2022. Current weight:  193 lbs. \par \par HGM has shown to be ranging from . He is testing minimally. \par \par There has been no significant hypoglycemia. \par \par He  denies any chest pain, sob, neurologic or ophthalmologic complaints. He  too denies any new podiatric concerns. He  is up to date with his ophthalmologic visit. Notes some itching and some numbness in hands bilaterally. UTD with  with optho-no retinopathy. Following with Dr. Ferris. \par \par POCT A1c 6.0%, previously 7.2% on 1/5/23. \par POCT Bg 105\par \par He is physically active with walking. \par Additional medical history includes that of Prostate Ca dx 8/14-low grade Too with cad hx 9 stents 2 more placed last February, hyperlipidemia, Minor mi, htn and COPD and hx of adenocarcinoma lung 2013 s/p rt upper lobectomy., vitamin d deficiency\par \par Meds: \par

## 2023-05-17 NOTE — ADDENDUM
[FreeTextEntry1] : POCT HbA1c and glucose carried out in office today given diabetes diagnosis. \par  \par

## 2023-05-25 ENCOUNTER — RESULT CHARGE (OUTPATIENT)
Age: 68
End: 2023-05-25

## 2023-05-26 ENCOUNTER — APPOINTMENT (OUTPATIENT)
Dept: PULMONOLOGY | Facility: CLINIC | Age: 68
End: 2023-05-26
Payer: MEDICARE

## 2023-05-26 ENCOUNTER — APPOINTMENT (OUTPATIENT)
Dept: PULMONOLOGY | Facility: CLINIC | Age: 68
End: 2023-05-26

## 2023-05-26 ENCOUNTER — RESULT CHARGE (OUTPATIENT)
Age: 68
End: 2023-05-26

## 2023-05-26 VITALS
HEART RATE: 77 BPM | BODY MASS INDEX: 31.02 KG/M2 | WEIGHT: 193 LBS | OXYGEN SATURATION: 97 % | DIASTOLIC BLOOD PRESSURE: 77 MMHG | SYSTOLIC BLOOD PRESSURE: 118 MMHG | HEIGHT: 66 IN

## 2023-05-26 LAB — POCT - HEMOGLOBIN (HGB), QUANTITATIVE, TRANSCUTANEOUS: 13.1

## 2023-05-26 PROCEDURE — 94010 BREATHING CAPACITY TEST: CPT

## 2023-05-26 PROCEDURE — 94727 GAS DIL/WSHOT DETER LNG VOL: CPT

## 2023-05-26 PROCEDURE — 99214 OFFICE O/P EST MOD 30 MIN: CPT | Mod: 25

## 2023-05-26 PROCEDURE — 94729 DIFFUSING CAPACITY: CPT

## 2023-05-26 RX ORDER — AZITHROMYCIN 250 MG/1
250 TABLET, FILM COATED ORAL
Qty: 1 | Refills: 0 | Status: DISCONTINUED | COMMUNITY
Start: 2022-10-25 | End: 2023-05-26

## 2023-05-26 RX ORDER — PREDNISONE 10 MG/1
10 TABLET ORAL
Qty: 12 | Refills: 0 | Status: DISCONTINUED | COMMUNITY
Start: 2022-10-25 | End: 2023-05-26

## 2023-05-26 NOTE — HISTORY OF PRESENT ILLNESS
[TextBox_4] : On Singulair prn, only takes pro air rarely \par no  cough. mild sob with stairs,no change, started to exercise\par losing weight 20 lbs on Ozempic\par \par Using . CPAP old 2017\par Not sleeping well\par did take trazodone last visit for 30 days , did help would like refill\par  \par

## 2023-05-26 NOTE — DISCUSSION/SUMMARY
[FreeTextEntry1] : Status post resection of lung carcinoma.  Clinically stable.\par TAYA on CPAP compliance \par COPD clinically and functionally stable.  Mild improvement in function likely related to weight loss.\par Overweight\par Insomnia likely related to use stress and home issues.  Poor sleep hygiene. refilled trazadone\par \par \par \par \par

## 2023-05-26 NOTE — PROCEDURE
[FreeTextEntry1] : 05/26/2023\par Pulmonary function testing\par These data demonstrate a mild obstructive ventilatory deficit. There is elevation in the RV/TLC ratio indicative of possible air trapping. There is a mild diffusion impairment. \par Mild increase in flow rates compared to September 2022.  Mild increase in diffusion.\par \par Cpap data downloaded and discussed with patient.\par 2 night HHS ordered\par \par

## 2023-05-26 NOTE — ASSESSMENT
[FreeTextEntry1] : Patient compliant to CPAP therapy and having positive clinical response to treatment. Continue present settings.\par singular daily \par Continue Symbicort 160\par PRN beta agonist\par Continue program of wt. loss/exercise\par F/U CT 1 year. July 2023\par Continue trazodone\par 2 night HHs\par \par 35 minutes spent in evaluation management and discussion\par \par

## 2023-05-27 PROCEDURE — 95800 SLP STDY UNATTENDED: CPT

## 2023-06-06 ENCOUNTER — APPOINTMENT (OUTPATIENT)
Dept: CT IMAGING | Facility: IMAGING CENTER | Age: 68
End: 2023-06-06
Payer: MEDICARE

## 2023-06-06 ENCOUNTER — OUTPATIENT (OUTPATIENT)
Dept: OUTPATIENT SERVICES | Facility: HOSPITAL | Age: 68
LOS: 1 days | End: 2023-06-06
Payer: MEDICARE

## 2023-06-06 DIAGNOSIS — Z98.89 OTHER SPECIFIED POSTPROCEDURAL STATES: Chronic | ICD-10-CM

## 2023-06-06 DIAGNOSIS — C34.90 MALIGNANT NEOPLASM OF UNSPECIFIED PART OF UNSPECIFIED BRONCHUS OR LUNG: ICD-10-CM

## 2023-06-06 PROCEDURE — 71250 CT THORAX DX C-: CPT | Mod: 26,MH

## 2023-06-06 PROCEDURE — 71250 CT THORAX DX C-: CPT

## 2023-06-13 ENCOUNTER — APPOINTMENT (OUTPATIENT)
Dept: THORACIC SURGERY | Facility: CLINIC | Age: 68
End: 2023-06-13
Payer: MEDICARE

## 2023-06-13 ENCOUNTER — APPOINTMENT (OUTPATIENT)
Dept: ENDOCRINOLOGY | Facility: CLINIC | Age: 68
End: 2023-06-13
Payer: MEDICARE

## 2023-06-13 VITALS
SYSTOLIC BLOOD PRESSURE: 122 MMHG | BODY MASS INDEX: 30.59 KG/M2 | WEIGHT: 190.38 LBS | TEMPERATURE: 98.2 F | DIASTOLIC BLOOD PRESSURE: 78 MMHG | HEIGHT: 66 IN | HEART RATE: 78 BPM | OXYGEN SATURATION: 98 %

## 2023-06-13 LAB
GLUCOSE BLDC GLUCOMTR-MCNC: 101
HBA1C MFR BLD HPLC: 5.8

## 2023-06-13 PROCEDURE — 83036 HEMOGLOBIN GLYCOSYLATED A1C: CPT | Mod: QW

## 2023-06-13 PROCEDURE — 36415 COLL VENOUS BLD VENIPUNCTURE: CPT

## 2023-06-13 PROCEDURE — 82962 GLUCOSE BLOOD TEST: CPT

## 2023-06-13 PROCEDURE — 99214 OFFICE O/P EST MOD 30 MIN: CPT | Mod: 25

## 2023-06-14 ENCOUNTER — TRANSCRIPTION ENCOUNTER (OUTPATIENT)
Age: 68
End: 2023-06-14

## 2023-06-14 LAB
25(OH)D3 SERPL-MCNC: 72.8 NG/ML
ALBUMIN SERPL ELPH-MCNC: 4.3 G/DL
ALP BLD-CCNC: 81 U/L
ALT SERPL-CCNC: 45 U/L
ANION GAP SERPL CALC-SCNC: 16 MMOL/L
AST SERPL-CCNC: 37 U/L
BILIRUB SERPL-MCNC: 0.4 MG/DL
BUN SERPL-MCNC: 11 MG/DL
CALCIUM SERPL-MCNC: 9.7 MG/DL
CHLORIDE SERPL-SCNC: 102 MMOL/L
CHOLEST SERPL-MCNC: 164 MG/DL
CO2 SERPL-SCNC: 21 MMOL/L
CREAT SERPL-MCNC: 1.06 MG/DL
CREAT SPEC-SCNC: 263 MG/DL
EGFR: 77 ML/MIN/1.73M2
ESTIMATED AVERAGE GLUCOSE: 137 MG/DL
FRUCTOSAMINE SERPL-MCNC: 233 UMOL/L
GLUCOSE SERPL-MCNC: 82 MG/DL
GLYCOMARK.: 24 UG/ML
HBA1C MFR BLD HPLC: 6.4 %
HDLC SERPL-MCNC: 62 MG/DL
LDLC SERPL DIRECT ASSAY-MCNC: 79 MG/DL
MAGNESIUM SERPL-MCNC: 2.3 MG/DL
MICROALBUMIN 24H UR DL<=1MG/L-MCNC: 1.2 MG/DL
MICROALBUMIN/CREAT 24H UR-RTO: NORMAL MG/G
POTASSIUM SERPL-SCNC: 4 MMOL/L
PROT SERPL-MCNC: 7.6 G/DL
SODIUM SERPL-SCNC: 139 MMOL/L
T3FREE SERPL-MCNC: 2.92 PG/ML
T4 FREE SERPL-MCNC: 1.3 NG/DL
TRIGL SERPL-MCNC: 122 MG/DL
TSH SERPL-ACNC: 1.02 UIU/ML

## 2023-06-16 ENCOUNTER — APPOINTMENT (OUTPATIENT)
Dept: PULMONOLOGY | Facility: CLINIC | Age: 68
End: 2023-06-16
Payer: MEDICARE

## 2023-06-16 VITALS
WEIGHT: 192 LBS | DIASTOLIC BLOOD PRESSURE: 80 MMHG | BODY MASS INDEX: 30.86 KG/M2 | HEART RATE: 80 BPM | OXYGEN SATURATION: 96 % | SYSTOLIC BLOOD PRESSURE: 126 MMHG | HEIGHT: 66 IN

## 2023-06-16 PROCEDURE — 99213 OFFICE O/P EST LOW 20 MIN: CPT

## 2023-06-16 RX ORDER — BUDESONIDE AND FORMOTEROL FUMARATE DIHYDRATE 160; 4.5 UG/1; UG/1
160-4.5 AEROSOL RESPIRATORY (INHALATION) TWICE DAILY
Qty: 1 | Refills: 5 | Status: ACTIVE | COMMUNITY
Start: 2022-03-28 | End: 1900-01-01

## 2023-06-18 NOTE — HISTORY OF PRESENT ILLNESS
[TextBox_4] : Had 2 night HHs\par here for results\par had  ct Dr Johnson 1/2023\par has upcoming appt\par feels trazodone helps with sleep\par On Singulair prn, only takes pro air rarely,Symbicort PRN\par no  cough. mild sob with stairs,no change, started to exercise\par losing weight 20 lbs on Ozempic\par \par \par Using . CPAP old 2017\par \par  \par

## 2023-06-18 NOTE — ASSESSMENT
[FreeTextEntry1] : start auto cpap Resmed with air fit 20 F 20 med full face\par Continue Symbicort 160 2 puffs daily, encouraged to use\par PRN beta agonist\par Continue program of wt. loss/exercise\par f/u Dr Osborne\par Continue trazodone\par \par \par \par \par 25 minutes spent in evaluation management and discussion\par \par

## 2023-06-18 NOTE — PROCEDURE
[FreeTextEntry1] : 05/26/2023\par Pulmonary function testing\par These data demonstrate a mild obstructive ventilatory deficit. There is elevation in the RV/TLC ratio indicative of possible air trapping. There is a mild diffusion impairment. \par Mild increase in flow rates compared to September 2022.  Mild increase in diffusion.\par \par Cpap data downloaded and discussed with patient.\par 2 night HHS results discussed\par \par

## 2023-06-18 NOTE — DISCUSSION/SUMMARY
[FreeTextEntry1] : Status post resection of lung carcinoma.  Clinically stable.\par TAYA severe AHI RDI 57 on CPAP compliance \par COPD clinically and functionally stable.  Mild improvement in function likely related to weight loss.\par Overweight\par Insomnia cont trazodone prn\par \par \par \par \par

## 2023-06-20 NOTE — HISTORY OF PRESENT ILLNESS
[FreeTextEntry1] : Mr. WALLS is a 67 year old male who returns today in follow up with regard to a history of type 2 diabetes mellitus. The diabetes mellitus has been present for over four years.\par \par There is no known history of retinopathy, nephropathy. He too denies any history of neuropathy. \par Started on Ozempic October 2022. Continues on 0.5 mg sc weekly. Tolerating well. \par He has lost 21 lbs since October 2022. Current weight: \par \par HGM has shown to be ranging    \par \par There has been no significant hypoglycemia. \par \par Optho neg-Dr. Lyric Ferris\par \par Hx of Prostate Erik dx 2014-dormant -no treatmetn is being monitired.  Too with hx of bph.\par \par He denies any chest pain, sob, neurologic or ophthalmologic complaints. He too denies any new podiatric concerns. He is up to date with his ophthalmologic visit. Notes some itching and some numbness in hands bilaterally. UTD with with optho-no retinopathy. Following with Dr. Ferris. \par \par \par POCT A1C returned today  at 5.8%\par POCT glucose returned today at 101\par \par \par He is physically active with walking. \par \par Additional medical history includes that of Prostate Ca dx 8/14-low grade Too with cad hx 9 stents 2 more placed last February, hyperlipidemia, Minor mi, htn and COPD and hx of adenocarcinoma lung 2013 s/p rt upper lobectomy., vitamin d deficiency\par \par

## 2023-06-27 ENCOUNTER — APPOINTMENT (OUTPATIENT)
Dept: THORACIC SURGERY | Facility: CLINIC | Age: 68
End: 2023-06-27
Payer: MEDICARE

## 2023-06-27 VITALS
RESPIRATION RATE: 18 BRPM | SYSTOLIC BLOOD PRESSURE: 125 MMHG | BODY MASS INDEX: 30.37 KG/M2 | DIASTOLIC BLOOD PRESSURE: 75 MMHG | OXYGEN SATURATION: 96 % | WEIGHT: 189 LBS | HEIGHT: 66 IN | HEART RATE: 104 BPM

## 2023-06-27 PROCEDURE — 99214 OFFICE O/P EST MOD 30 MIN: CPT

## 2023-06-27 RX ORDER — FLUTICASONE PROPIONATE 50 UG/1
50 SPRAY, METERED NASAL
Qty: 16 | Refills: 0 | Status: COMPLETED | COMMUNITY
Start: 2021-10-15 | End: 2023-06-27

## 2023-06-27 RX ORDER — TRAZODONE HYDROCHLORIDE 50 MG/1
50 TABLET ORAL
Qty: 30 | Refills: 1 | Status: COMPLETED | COMMUNITY
Start: 2022-09-12 | End: 2023-06-27

## 2023-06-27 NOTE — ED PROVIDER NOTE - NS ED ROS FT
[facility]  Developmental and Behavioral Pediatric Follow Up    Name: Paul Blancas                        YOB: 2008      Patient ID: [id number]           Age: 14 year old  Date of Service:  6/27/2023                    Clinician: Binh Santoyo MD    This child and family were seen for a Developmental and Behavioral Pediatric physician consult follow-up visit.      This visit was performed via telemedicine using two-way, real-time interactive telecommunications between the patient and the developmental pediatrician. The interactive telecommunication technology included audio and video. The patient was offered telemedicine as an option for care delivery and consented to this option.      Date: June 27, 2023  Time of patient contact: 2:00  Clinician Location: Provider from [facility], [address] [place] [address].  Patient Location: Home  Other participants present with patient: Mother.         Update from last visit:  Since the last visit, Paul has graduated 8th grade and will be going to [employer] for High School in the fall.  This is a special education program that will go through 23 years old.  He will get academics learning as well as vocational training.      Zhao still has behavioral problems of running away.  He now has been putting his hands into his pants.  He has been getting LIZ and his new therapist is working on his behavior and safety awareness.  He is trying to wear compression underwear and encourage him to put his hand in his pockets.  He has problems with flipping light switches.  Despite this he is very loving and affectionate.  He is doing well with his eating.  He is not rigid and enjoys going out with his mother.  He has significant pacing back and forth.  He used to like being outside but does not want to sit or be anywhere for long.    Zhao has been taking clonidine 0.25mg in the evening.  He is no longer getting  the morning dose.  He also takes gabapentin at night to help with sleep.  He takes melatonin in the night if he wakes up before 2:30am.  Dr. Staples recommended to give another 5ml of gabapentin if he wakes in the night.    Previous history from prior appointments:  6.27.22:  Since the last visit, Paul has finished the 7th grade at [employer].  He did well in school this year but he still elopes and has run away from school.  He has an individualized aide at school to help with his behavior.  Once he was able to get out of the school and down the block.  He has been with a new teacher this year which is a good fit for him.  He is in a self-contained classroom of 12 children and 1 teacher with 6 aides.  He has an IEP and receives speech, occupational, psychology, and social work therapies.     Paul is now using a Nova Chat device to help with his communication.  He uses it to ask to go outside and to choose food options.  He answers questions at school and might be learning how to spell some words.     Zhao is getting occupational and speech therapy weekly at [facility].  He gets LIZ therapy through [facility] for 9-12 hours weekly.     Zhao is being seen by Dr. Shane for help with sleep.  He is now taking gabapentin 15 ml which is 750 mg.  He takes clonidine 0.05mg in the morning and 0.25mg in the evening.  Since going up on these medications he is now able to sleep through the night much better.     Developmental Testing Data from this visit:  The [place] Autism Rating Scale, Third Edition, (GARS-3) is a norm-referenced instrument that assists in identifying and diagnosing autism in individuals aged 3 years to 22 years and is based on the diagnostic criteria in the Diagnostic and Statistical Manual of Mental Disorders-Fifth Edition (DSM-5).  The instrument consists of 58 clearly stated items describing the characteristic behaviors of persons with autism. The items are grouped into  six subscales: Restrictive/Repetitive Behaviors, Social Interaction, Social Communication, Emotional Responses, Cognitive Style, and Maladaptive Speech.  The GARS-3 uses a standardized score referred to as the autism index. Scores of 71 or higher on the autism index indicate that an individual is very likely to have autism. Scores of 55 to 70 indicate that an individual may have autism, and any score of 54 or less suggests that it is unlikely that the individual has autism. Paul’s Autism Index was 97.  These results support a diagnosis of autism spectrum disorder.     Childhood Autism Rating Scale, Second Edition (CARS-2)  Paul was observed using the CARS-2.  This assessment is a behavior rating scale intended to help diagnose autism spectrum disorder.  It has proven especially effective in discriminating between children with autism and those with severe cognitive deficits, and in distinguishing mild-to-moderate from severe autism.  The CARS-2 is a diagnostic assessment method that rates children on a scale from one to four for various criteria, ranging from normal to severe, and yields a composite score and percentile.      Raw Score:  42.5  T-Score:  55  Percentile:  69  Diagnostic Category:  Severe symptoms of autism spectrum        Previous history from prior appointments:  6.7.21:  Since the last visit, Paul is finishing the 6th grade at [employer].  He has been going to school in person since February.  He works with an aide in a separation classroom and his teacher is teaching from a different room over the computer.  This is better for his safety and he has difficulty with wearing a mask.       Zhao is now taking gabapentin 10ml since the last visit to help with his sleep.  He continues to wake in the night about 3 nights a week.  He claps aggressively when he wakes up and he also does this when he is going to bed.  It seems that he seeks the pressure and sound of clapping his hands and giving  him fidgets do not help.  On occasion he takes Benadryl to help with getting back to sleep.  He takes clonidine 0.25mg in the evening to help with sleep as well.  He no longer takes clonidine during the day.     Zhao continues to have problems with running and wandering.  He has not been out in the community much since the pandemic.  He has a hard time keeping his mask on when he is out of the house.     Zhao is communicating his wants by bringing things to his parents for help.  He is able to go into the bathroom when he needs to toilet.  He has a communication device but has not made much progress.  He had an evaluation through school but he was not thought to be a candidate for AAC at school that is dynamic.  It was recommended that he have a static device such as a Go Talk.  He has a limited number of pictures and makes choices among 21 pictures on his board.     We discussed safety issues such as an identification bracelet or shoe ID.     Zhao continues to get LZI with [facility].  He is going to be getting 15 hours weekly this summer.  He is working on writing and reading.  He has done a safety sign recognition module.  He is working on life skills and adaptive skills.  He continues to get speech and occupational therapies at [facility].     Zhao is doing well with his eating and he grazes throughout the day.  He does not yet reliably use utensils.     Zhao sometimes has sad periods where he cries and it is not sometimes clear why he gets upset.  He usually recovers on his own and lasts a few minutes at a time.  He is not aggressive.     Previous history from prior appointments:  12.2.20:  Since the last visit, Paul has been taking gabapentin 7.5ml at bedtime to help with sleep but he still is waking up in the night.  He takes clonidine 0.05mg in the morning and 0.25mg in the evening.  He wakes in the night about 1-2 times a week.  Sometimes he goes for weeks without waking in the  night.  His mother gives him Benadryl sometimes to go back to sleep in the night if he wakes early in there night.     Zhao is in the 6th grade at [employer].  He is in a remote learning program.  He has some live video classes and then he works on assignments offline as well.  He is learning very basic academic things like counting and matching numbers.  He is able to point to some sight words and can recognize some words or where letter should be in words.  He has an IEP and receives speech, occupational, and psychology services over video.  He takes a lot of movement breaks and jumps or runs around during the day.     Zhao is working on sorting behaviors with socks and is learning to dress himself better with decreased prompts.  He is working on wiping himself and washing himself.  He needs things to be given to him a correct way for him to do things successfully.     Zhao continues to get LIZ therapy at [facility] for about 10 hours weekly.  He continues to get speech and occupational therapy at [facility].     Previous history from prior appointments:  8.27.20:  Since the last visit, Paul has started to take clonidine 0.05mg in the morning along with his other medications.  He continues to have hyperactivity but a little less stimming behaviors.  He is having more crying episodes and they last around 5-10 minutes at a time.  He wants to be left alone and then he is okay.       Paul has continued to take clonidine 0.25mg in the evening and gabapentin as well to help with sleep.  He has been doing okay with his sleeping and eating.     Paul continues to work with [facility] on staying with his mother on walks and wearing his mask.  He is working on paying attention to screens for remote learning.  He is working on getting dressed by himself.  He sometimes can use a face shield as well.     Paul continues to fight hair cuts.  He likes to jump, climb, and rides on his scooter.  He  likes to bounce on a therapeutic ball.     Previous history from prior appointments:  7.16.20:  Since the last visit, Paul has continued to have sleep problems and was seen recently by Dr. Staples in neurology for his sleep.  He increased his dosage of gabapentin and has stopped Keppra.  He continues to take clonidine 0.25mg in the evening to help with sleep.     Zhao has finished fifth grade at [employer].  He had difficulty with participating in e-learning and did not want to do school work on the tablet or video meetings with teachers.       Zhao had stopped getting speech and occupational therapy during the pandemic lockdown.  This is being restarted now that the clinic is reopening.  He had continued to get LIZ therapy with [facility] for 6-9 hours weekly.  He is working on wearing a mask.  He is also working on being able to walk outside beside his mother without running away.  He has had some success being able to do this.  He ran away from his parents several yards away and then stopped to wait for them.     Zhao is still using PECS to communicate but is not using a device any longer.  He seemed to not be able to use any tablet and it was too complicated for him to change between screens.  He uses PECS only for requesting.  He has some gestures and is able to get many things by himself.  He is becoming more independent.  He is able to dress himself if he is given his clothing.  He is cleaning himself in the bathroom and is taking a shower with less assistance.  He still continues to be prompt dependent.     Zhao continues to have hyperactivity and repetitive behaviors.  He has repetitive clapping behaviors and likes the deep pressure and loud sounds.  He paces when he is indoors.     Zhao can have problems with eating, especially when he is at school.  He refuses to eat there and it can cause behavioral problems and anger.  He eats better at home and likes fruits and vegetables.     We  discussed considering adding a small amount of clonidine in the morning to help with activity level during the day.     Previous history from prior appointments:  7.1.19:  Since the last visit, Huma has finished fourth grade at [employer].  There were 12 children and 1 teacher .  He had an individualized aide in the classroom.  He has worked on following his routine and becoming more independent.  He has an IEP and receives speech, occupational, social work, and psychology services.  He has had problems with his hyperactivity and there is risk for elopement.     Huma has significant repetitive behaviors including pacing and hitting his hands on himself or objects.  He flaps things considerably.     Huma is getting 12-15 hours of LIZ through [facility].  He gets speech and occupational therapies through [facility].  He continues to use the Novachat at home but he is using more PECS at school.  He is becoming more successful when using his PECS.  His LIZ therapists are using more PECS as well.  He uses a whole PECS book.  He can put some pictures together in short phrases for \"I want...\"     Huma is taking Keppra for his abnormal EEG.  Dr. Staples has lowered his dosage.  He continues to take gabapentin for his sleep.  He also takes Clondine 0.2mg in the evening for sleep.  He does not take any of his medication during the day for calming.     Huma has a lot of hyperactive behaviors, mostly in the afternoon.  His repetitive behaviors are worse in the afternoon as well.     Huma is eating in small amounts sporadically.  He moves a lot when he eats and during the day.  He burns significant calories pacing, jumping, and stimming.     Previous history from prior appointments:  6.25.18:  HUMA is an 9 years 11 months old boy here for follow up for autism spectrum. He was last seen 8 months ago.     Since the last visit, Zhao will be starting the 4th grade. He will be starting at  [employer] in [place]. There will be 13 children and 1 teacher with 2 aides in the classroom. He will have his own individual aide in the classroom. He will be in a cluster program with children in grades 3, 4, and 5. He will continue to have an IEP and receives speech, occupational, psychology, and consult with hearing specialist.     Zhao is now toilet trained after an intensive program with LIZ. He is now taking himself to the bathroom and he only rarely has accidents.     Zhao continues to be treated for seizures with Keppra and Gabapentin. He is taking clonidine 0.2mg in the evening for sleep.     Zhao is getting 12-15 hours of LIZ through [facility]. He is getting speech therapy and occupational therapy at [facility]. He continues to use NovaChat but he has some problems with navigating to the appropriate page. If the choices are on the screen he does well at selecting a picture. He has a potty card that he can use as well.     Zhao continues to have significant repetitive stim behaviors and hyperactivity, but he does not have significant tantrums. He does have some behaviors of running away from others. He runs if his hand is not held. He taps things on his face and head. He sometimes spits when he plays with his saliva.     Zhao is eating well but with his significant hyperactivity he is still thin.      Historical HPI   10/9/2017     Paul \"Zhao\" is an 9 years 3 months old boy here for follow up for autism spectrum. He was last seen 3 months ago.     Since the last visit, Zhao consulted Dr. Pizano in sleep medicine for difficulties with sleep patterns. He was started on a gabapentin regimen of 100 mg per day. Won's family has also started implementing behavioral strategies to promote better sleep, such as developing a consistent routine and shifting focus on Zhao resting in his bed rather than sleeping. His mother has seen an improvement in his sleep. He now stays  asleep for 5 out of 7 nights during the week. He occasionally still wakes up at 3 or 4 am to play. She increased his dose to Gabapentin 150 mg per day. We discussed calling Dr. Pizano's nurse to follow up after making this medication change.     Zhao started the 3rd grade at [employer]. He is in a self-contained classroom with 8 children, 1 teacher, and 3 aides. He has an IEP and receives school based speech therapy and occupational therapy. A deaf educator and psychologist also push into his classes. Zhao's school will discuss his transfer at his next IEP meeting in November. His current school does not go past 3rd grade. His parents are considering [employer] and [employer].      Zhao continues outpatient speech therapy and occupational therapy at [facility]. Zhao uses a NovaChat at school, at home, and at therapy. He is able to make activity choices and food choices. He names his body parts. Zhao continues 12 hours per week of LIZ through [facility]. He is making slow progress meeting his goals. He continues to stim and is hyperactive.      Zhao continues taking clonidine 2 tablets before bed every night in addition to his Keppra and gabapentin regimen. He is followed by neurology.      ---------------     7/10/17     Paul \"Zhao\" is an 9 year old boy here for follow up for autism spectrum. He was last seen one month ago.     Zhao has been taking an increased dose of clonidine since the last visit. He currently takes 2 tablets (0.1 mg/tablet) before bed. He occasionally also takes melatonin 5mg before bed. Zhao continues to have sleeping concerns. While he is able to fall asleep faster, he continues to wake up in the middle of the night, and on most nights, he will remain awake for the rest of the day. His mother implements a consistent night time routine that includes dinner with clonidine administration in his milk around 8 pm, teeth brushing, bathroom time,  and then bed. His room environment is quiet and dark. Zhao received a sleep study 3 years ago that showed apnea. He followed up with ENT who recommended a nasal spray. Zhao was unable to tolerate using the spray.      Since taking the increased clonidine dose, Zhao is more calm and self-regulated. He continues LIZ therapy, speech/language therapy, and occupational therapy at [facility].      Zhao will attend 3rd grade at [employer] in the fall. He will likely be enrolled in a self-contained classroom and will continue receiving IEP supports and school based therapies.      Zhao continues seizure medication per neurology. We discussed a referral to sleep medicine for further evaluation of his sleeping concerns.      ------------------------------------------------------------------------------------------------------------------------------------  HUMA is a 8 years 11 months old boy here for follow up for autism spectrum and aural atresia. He was last seen a year ago.     Since the last visit, Zhao has finished the second grade at [employer]. There were 8 children with 1 teacher and 3 aides in the self-contained classroom. He has an IEP and receives speech, occupational, and psychological therapies. He is reported to be making expected progress at school. Zhao has one more year next year at [employer] and he may need alternative placement after this year due to his significant educational needs.     Zhao continues to take Keppra for control of seizures. His EEG done with neurology still is showing abnormalities so he has continued to need to be on medication.     Zhao continues to have behavioral difficulties. He gets hyperactive and is out of his seat often at school. He can be easily redirected and his behavior is much better if he sleeps well at night. He is more hyperactive in the afternoons.     Zhao is currently taking clonidine 0.1mg in the evening for sleep. He has been on this  same dosage for years, but recently he is not sleeping as well at night and waking in the night. He often stays awake for hours in the night.     Zhao is currently receiving speech therapy as an outpatient and LIZ therapy at [facility] for 15-18 hours weekly at home. He is making some improvements with toileting and his mother uses timed voiding for him. He does not initiate needing to go to the bathroom on his own and will have accidents at times.     Zhao is eating well but is still a picky eater. He is not gaining weight easily but he seems to burn a lot of calories with his increased activity level.     ----------------------     06.20.2016     Paul is a 7 years 11 months old boy here for follow up for autism spectrum. He was last seen a year ago.     Since the last visit, Paul has been doing well. He now has a NovaChat and is using it to ask for food. He is using it at school and home. He continues to use some words to ask for things as well. His words come and go over time. He is able to follow most commands but he has problems with multiple step commands.     Paul has finished first grade at [employer]. There were 10 children and 1 teacher with 3 aides in the classroom. He has an IEP and receives speech and occupational therapies. There are not significant behavior problems but he will run away from teachers at times. He is not aggressive to others. He spends a large part of his day in repetitive behaviors tapping objects to his mouth and face. He is learning his letters and numbers.     Paul can be hyperactive in the afternoon after therapies are complete. He paces back and forth and gets over stimulated. His mother gives him deep pressure and joint compressions but these do not work right away. We discussed considering a use of medication to help with his behavior, but the issue does not seem significant enough and mostly confined to the late afternoons. A medication does not seem  appropriate at this time.     Paul is receiving speech therapy as an outpatient at [facility]. He is getting LIZ from [facility] for about 20 hours weekly. He is working on playing with toys appropriately.     Paul is now using the bathroom and stays dry most of the day as well as having bowel movements.      Paul is taking Keppra for seizures and has been taking this for several years. He will have an EEG this summer to determine if he needs to continue the medication. He is taking clonidine 0.1mg at bedtime for sleep. He still has some episodes that he wakes in the night and then has problems for the next several nights. This happens about every 3 months.     ----------------------------------------------------------------------------------------------------------------------------------------  06.19.2015     The following information is from his previous visit:     Paul is a 6 years 11 months old boy here for follow up for autism spectrum. He was last seen a year ago.     Since the last visit, Paul continues to have difficulty with his development and sleep. He is saying more words than before and is asking for items saying \"water please\" or \"chips please.\" He will sometimes have more complex phrases but this happens rarely. He knows his colors, shapes, and some letters.     Paul has finished  at [employer]. He will be in the summer school program. There were 6 children and 3 teachers in the self-contained classroom. He has an IEP and receives speech and occupational therapies.     Paul receives speech and occupational therapies as an outpatient. He receives LIZ services through [facility] for 12-15 hours a week depending on his schedule. He is doing well with working at the table and is making improvements with repeating words but will not spontaneously use many words on his own. His mother is involved in the sessions. He is making some progress  with toilet training for urination. He is putting his clothes on with supervision. He brushes his own teeth if his mother sings a song. He does not need as much hand over hand assistance as before.     Paul continues to have sensory difficulties. He prefers to take off his socks and shoes. He folds his ear to hear sounds differently. He continues to mouth objects and explore objects by touch.     Palu is learning how to play with toys including catch with a ball. He is learning how to ride a bike. He uses going outside as a motivator as well as some foods.     There are no aggressive behaviors. He has times that he cries without a known reason and it can last about 15-20 minutes. He runs away at times and has run into the street before. We spoke about safety recommendations.     Paul continues to be taking Keppra twice daily. He no longer has laughing fits like he had before which may be related to the medication.     Paul goes to bed around 9pm. He does not fall asleep for an hour or more. He sleeps in a bed with his sister. He will wake up sometimes at 4-5am and other times at 7am. His mother tried Benadryl which sometimes helps but sometimes makes him more hyper. He has tried Melatonin in the past, but he would wake up earlier than expected.     Paul has problems with restricted diet. He will eat fruits and some meats including chicken, hamburgers, and hot dogs. He likes rice and noodles. He eats the food given to him at school. He gets a Pediasure shake supplement. We discussed strategies to increase his calorie intake.     ----------------------------------------------------------------------------------------------------------------------------------------  06.13.2014     Paul underwent evaluation today for autism using the Autism Diagnostic Observation Scale (ADOS-2), module 1 which is appropriate for his level of communication and speech. He is not verbal. The ADOS contains tasks that  evaluate his communication, social skills, and play. The following are observations from the evaluation:     Paul entered the room easily in his stroller and was presented with toys. He approached the examiner with eye contact, but without purposeful vocalization. He did not play with any toys functionally and mostly mouth the objects including strings of yarn. He seemed preoccupied with playing with his tongue.     When calling his name, Paul responded with brief eye contact on the 2nd press but without any vocalization or change in affect. Paul did not respond to a social smile until the anticipation of tickling was associated with the interaction. During the tickling Palu anticipated the game with smiling and some brief eye contact, but did not sustain reciprocal interactions. During the balloon task he did not show any joint attention or enjoyment when the balloon was deflated.     Paul did not respond to five presses to look at the toy rabbit with a vocal prompt and following the evaluators eyes. He was able to follow the evaluators point to the rabbit with a vocal cue. He did at show enjoyment of the rabbit and briefly played with it. During bubble play, Paul clearly enjoyed playing in the bubbles and he attempted to eat the bubbles. He did not request more bubbles and kept his attention on the bubbles and blower.     During the birthday party, Paul did not anticipate or participate in the birthday song and play sequence. Instead he ran around the room jumping repetitively. When doing the symbolic play task, Paul was unable to imitate any functional actions with objects. He instead mouthed and bit the toys.     For the snack, Paul was shown each food and given a sample of each. When requesting more, he did not make eye contact and did not point to indicate his wants. Instead he grabbed the cookies out of the container when it was opened and within reach without any eye contact or  vocalization. Throughout the evaluation, but no significant social overtures were observed. He did come to the evaluator into his mother for deep squeezes by backing into them. He was more active than expected and there were significant repetitive sensory seeking behaviors.     The results of scoring the ADOS are consistent with a diagnosis of autism spectrum. Feedback was given at the completion of this visit.     ----------------------------------------------------------------------------------------------------------------------------------------   01.15.2014     The following is information from his previous visit:     Paul is a 5 years old boy here for follow up of developmental delay. He was last seen a year ago.     Since the last visit, Zhao had significant regression of skills starting in May or 2013. He had regression of language and loss of words in the past 4 months. He has decreased joint attention and eye contact. His maternal grandmother  in  and she lived upstairs in the building from Paul.      Paul goes to [facility] [employer] [facility] 4 days a week. He has an IEP, but this has recently been revised. In December the school IEP team decided to discontinue speech and occupational therapy services because of regression and lack of joint attention. The family appealed this decision and this morning had another meeting. The school is offering speech consultation 15 minutes per week and he will get occupational therapy. There are 9 children and 1 teacher with 2 aides in the classroom.     Paul goes to [facility] once weekly for behavioral therapy. He gets private speech and occupational therapy weekly.     Paul was recently evaluated by Dr. Zhen Almaraz who diagnosed autism. He has an appointment with a pediatric neurologist at the end of the month and an order from ENT for a head CT scan.     We spoke about the need for Paul to be reinstated in therapies at school for  speech and occuaptional therapy. His developmental regression should increase services, not decrease them at this age. We will proceed with a pediatric neurology workup which may include an EEG and MRI to get more information about possible reasons for developmental regression. If there is not a clear medical reason for his regression, we will undergo an ADOS evaluation for autism spectrum.        ---------------------------------------------------------------------------------------------------------------------------------------   12.17.2012  The following is information from his previous visit:     Paul is a 4 years 5 months old boy here for follow up of developmental delay. He was last seen 3 months ago.     Since the last visit, Paul has started occupational therapy at [facility]. They are working on a brushing program and joint compressions.     His parents went to an [facility] meeting and it seems to the parents that he has qualified for an autism classroom. He had an evaluation and will be starting in a self-contained classroom for children with an educational classification of autism. The question of a diagnosis of autism will be revisted with the parents in the future, but the classroom will best fit his needs currently. He will use a picture exchange communication system to help with functional communication. He will get speech and occupational therapy in the classroom.     Paul has made some progress with communication in his speech group at [employer]. He continues to have difficulties with appropriate communication skillls and social overtures. He had some eye contact during this visit, but no recognizable vocalizations. He mostly babbled to himself.     Paul continues to have difficulty with settling for sleep. His parents struggle to help him calm and quiet for sleep. We discussed the use of Melatonin as a aid for sleep  onset.     ---------------------------------------------------------------------------------------------------------------------------------------  09.17.2012     The following is information from his initial evaluation:      Paul is a 4 years 2 months old boy here for concerns about development and language. This is his first evaluation by this clinic.     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 He was getting speech therapy and developmental therapy through [facility]. He has recently aged out of [facility].     Paul still has significant developmental delay. He communicates in some scripted phrases but mostly babbles. He laughs often and it is not clear what makes him laugh. He often has an open mouth posture. Therapists have mentioned he has low muscle tone in his mouth.     Paul gets frustrated often with not being able to communicate. He gets upset often and cries frequently when trying to communicate. He can make a choice if it is presented verbally with options. He has some simple words. He can communicate in phrases such as \"Mommy I want juice\" or \"Mommy I'm hungry.\"     Paul climbs on people often and has a high activity level. He likes animal toys and carries the animals around. He will push baby dolls in a toy stroller. He carries toy cars around and stack Legos.     Paul sees Dr. Mustafa in ENT. He has had his hearing tested and his left ear is normal, but there is no hearing on the right side.     Paul knows his colors and shapes. He will answer straightforward questions, especially if he knows the person who is asking.     Paul attends [facility]  twice weekly. Paul had an evaluation with the school district and qualified for speech therapy, but no other therapies. He continued to be in a group speech therapy class for children with hearing loss at [facility].     Paul has some problem behaviors. He wants to climb on people. He has  some sensory seeking behaviors and rubs his mouth on people. He takes his mothers hands and rubs her fingernails across his mouth. He prefers to take off his shirts and he has clothing preferences. He is easily ticklish and avoids some light touch. He loves to play in the bathtub. He is okay with brushing his teeth. He seems to have a normal reaction to pain. He has sniffles often.     Paul was born full term by vaginal delivery with a birth weight of 7 pounds 9 ounces. His mother has diabetes and was on insulin during pregnancy. He had a 2 vessel umbilical cord and an abdominal ultrasound done after delivery was normal.     Paul has been relatively healthy with no hospitalizations and no surgeries. There is no hearing in his right ear and normal hearing in the left ear. The family will explore cosmetic correction of his right ear atresia in the future. He takes no medications and has no known allergies.     Paul began walking at 10 months old. He began talking in single words before 3 years old. He did not say mama until after 3 years old. He still has variable communication and most of the sounds he makes are babbling and jargoning. His hearing is not thought to have been a factor in his speech delay since he hears from the left ear normally.     aPul lives with his mother, father, and sister who is 6 years old. His maternal grandparents live upstairs in the building. In the family, his paternal grandmother has depression. There are no other developmental or behavioral problems in the family.     Paul has problems with sleeping at night. He will not settle for sleep and it takes a long time for him to go to sleep. Bedtime is 9pm and he does not fall asleep until 11 or 12. He naps sometimes but it will often interfere with his sleep at night.     Paul eating using a fork and spoon, but prefers to eat with his hands. He is somewhat picky, but he eats most food offered to him.        Impression:   Paul is a 14 year old old whose development and behavior are consistent with:    1. Autism spectrum disorder    2. Intellectual disability    3. Aural atresia      Paul benefits from the following home, community and school-based interventions.  Close follow up is recommended.    Recommendations:     Discussed continuing with IEP supports and therapies.    Discussed continuing with strategies in the book Making Sense of Autism Spectrum Disorders by Binh Slaughter MD.     Discussed continuing with LIZ therapy as an outpatient in a family-centered program that allows for a multidisciplinary approach.  Discussed continuing with speech and occupational therapies as an outpatient.     Discussed continuing Gabapentin treatment per Dr. Shane for help with sleep and calming.    Discussed continuing with clonidine medication 0.25mg in the evening and restart a small dose of 0.05mg in the morning.  If needed this could be increased to a total of 0.4mg total daily dose if needed.  We may also consider guanfacine if needed for the morning dose if the clonidine is too sedating.    Follow up with Megha, our Advanced Nurse Practitioner, in 1 month.        Total time spent on patient care, including face-to-face and non-face-to-face time on date of encounter: 40 min      If you have questions, please call [facility] [address] [address]  Dept Phone: [phone]       Sincerely,    Binh Santoyo MD    Cc: family, primary care physician   General: +fever  Head: +headache  Eyes: no vision change  ENT: +nasal discharge/congestion, no sore throat, +bilateral ear fullness but no pain  CV: +chest pain with coughing  Resp: +SOB, +dry cough  GI: no N/V/D, no abdominal pain  : no dysuria  MSK: +generalized muscle aches, no neck pain  Skin: no rash  Neuro: no focal weakness, no change in sensation

## 2023-06-27 NOTE — ASSESSMENT
[FreeTextEntry1] : 67 year old male S/P right VATS, RUL wedge resection x1 on 7/15/13 for a T1aNx minimally invasive adenocarcinoma. Because of the final pathology results a redo right VATS with completion RULobectomy and MLND was done performed on 8/16/13. There was no evidence of disease at that time. \par \par I have reviewed the patient's medical records and diagnostic images at time of this office consultation and have made the following recommendation:\par 1. CT scan showed no evidence of recurrence, recommended patient to return to office in 1yr w/ CT Chest w/o contrast.\par \par  I have independently reviewed the medical records and imaging at the time of this office consultation, and discussed the following interpretations with the patient:\par \par \par I, Dr. BUCIO [facility], personally performed the evaluation and management (E/M) services for this established patient who presents today with (a) new problem(s)/exacerbation of (an) existing condition(s). That E/M includes conducting the examination, assessing all new/exacerbated conditions, and establishing a new plan of care. Today, my ACP, Fede Dye NP was here to observe my evaluation and management services for this new problem/exacerbated condition to be followed going forward.\par \par

## 2023-06-27 NOTE — DATA REVIEWED
[FreeTextEntry1] : Independently reviewed the following:\par - PFTs on 5/26/23\par - CT chest on 6/6/23

## 2023-06-27 NOTE — HISTORY OF PRESENT ILLNESS
[FreeTextEntry1] : 67 year old male S/P right VATS, RUL wedge resection x1 on 7/15/13 for a T1aNx minimally invasive adenocarcinoma. Because of the final pathology results a redo right VATS with completion RULobectomy and MLND was done performed on 8/16/13. There was no evidence of disease at that time. \par \par CT Scan of the Chest 7/25/2018: \par - Stable with no evidence of disease.\par \par Chest CT on 7/16/19:\par - RUL resection with stable postsurgical changes\par - Lungs otherwise clear\par - No enlarged mediastinal , hilar or axillary lymph nodes\par \par CT chest scan 7/1/2020:\par -No pulmonary nodules are noted. \par \par CT Chest on 7/28/21:\par - Stable exam\par \par CT chest on 07/26/2022:\par - Status post right upper lobectomy. New sub-3 mm left lung nodule on image 45 series 2. There is centrilobular emphysema.\par \par CT Chest 11/29/22:\par - Since 7/26/2022, the 3 mm DAX nodule has resolved.\par - Calcified RLL nodule an 2 mm DAX nodule are unchanged\par \par PFTs on 5/26/23: FVC: 2.83 (83%); FEV1: 1.87 (70%); DLCO: 16.39 (65%)\par \par CT chest on 6/6/23: \par - Status post RULobectomy with stable postop changes\par - No evidence of new or metastatic disease.\par \par Patient presents to office for 6 month follow up. Denies SOB, CP, cough.

## 2023-07-14 ENCOUNTER — NON-APPOINTMENT (OUTPATIENT)
Age: 68
End: 2023-07-14

## 2023-07-14 ENCOUNTER — APPOINTMENT (OUTPATIENT)
Dept: CARDIOLOGY | Facility: CLINIC | Age: 68
End: 2023-07-14
Payer: MEDICARE

## 2023-07-14 VITALS
HEART RATE: 94 BPM | HEIGHT: 66 IN | BODY MASS INDEX: 30.22 KG/M2 | SYSTOLIC BLOOD PRESSURE: 112 MMHG | WEIGHT: 188 LBS | OXYGEN SATURATION: 96 % | TEMPERATURE: 98 F | DIASTOLIC BLOOD PRESSURE: 74 MMHG

## 2023-07-14 PROCEDURE — 99214 OFFICE O/P EST MOD 30 MIN: CPT

## 2023-07-14 PROCEDURE — 93000 ELECTROCARDIOGRAM COMPLETE: CPT

## 2023-07-14 NOTE — HISTORY OF PRESENT ILLNESS
[FreeTextEntry1] : This is a 67 year y/o male with a PMHx of Adenocarcinoma of the lung and prostate, Obesity,  coming in today for follow up. Pt states he has back and neck pain that has been worsening recently. Pt denies any CP,   heart palpitations, dizziness, abdominal pain, N/V/D, fever or chills. \par pt with ocassional dyspnea

## 2023-07-30 ENCOUNTER — RX RENEWAL (OUTPATIENT)
Age: 68
End: 2023-07-30

## 2023-07-30 ENCOUNTER — TRANSCRIPTION ENCOUNTER (OUTPATIENT)
Age: 68
End: 2023-07-30

## 2023-08-02 ENCOUNTER — APPOINTMENT (OUTPATIENT)
Dept: CARDIOLOGY | Facility: CLINIC | Age: 68
End: 2023-08-02
Payer: MEDICARE

## 2023-08-02 PROCEDURE — A9500: CPT

## 2023-08-02 PROCEDURE — 78452 HT MUSCLE IMAGE SPECT MULT: CPT

## 2023-08-02 PROCEDURE — 93306 TTE W/DOPPLER COMPLETE: CPT

## 2023-08-02 PROCEDURE — 93015 CV STRESS TEST SUPVJ I&R: CPT

## 2023-08-03 ENCOUNTER — NON-APPOINTMENT (OUTPATIENT)
Age: 68
End: 2023-08-03

## 2023-09-05 ENCOUNTER — RESULT REVIEW (OUTPATIENT)
Age: 68
End: 2023-09-05

## 2023-09-05 DIAGNOSIS — N63.0 UNSPECIFIED LUMP IN UNSPECIFIED BREAST: ICD-10-CM

## 2023-09-10 ENCOUNTER — RX RENEWAL (OUTPATIENT)
Age: 68
End: 2023-09-10

## 2023-09-13 ENCOUNTER — OUTPATIENT (OUTPATIENT)
Dept: OUTPATIENT SERVICES | Facility: HOSPITAL | Age: 68
LOS: 1 days | End: 2023-09-13
Payer: MEDICARE

## 2023-09-13 ENCOUNTER — APPOINTMENT (OUTPATIENT)
Dept: ULTRASOUND IMAGING | Facility: IMAGING CENTER | Age: 68
End: 2023-09-13
Payer: MEDICARE

## 2023-09-13 ENCOUNTER — RESULT REVIEW (OUTPATIENT)
Age: 68
End: 2023-09-13

## 2023-09-13 ENCOUNTER — APPOINTMENT (OUTPATIENT)
Dept: MAMMOGRAPHY | Facility: IMAGING CENTER | Age: 68
End: 2023-09-13
Payer: MEDICARE

## 2023-09-13 DIAGNOSIS — Z98.89 OTHER SPECIFIED POSTPROCEDURAL STATES: Chronic | ICD-10-CM

## 2023-09-13 DIAGNOSIS — N63.0 UNSPECIFIED LUMP IN UNSPECIFIED BREAST: ICD-10-CM

## 2023-09-13 PROCEDURE — 77066 DX MAMMO INCL CAD BI: CPT | Mod: 26

## 2023-09-13 PROCEDURE — G0279: CPT

## 2023-09-13 PROCEDURE — 76642 ULTRASOUND BREAST LIMITED: CPT

## 2023-09-13 PROCEDURE — 76642 ULTRASOUND BREAST LIMITED: CPT | Mod: 26,RT

## 2023-09-13 PROCEDURE — G0279: CPT | Mod: 26

## 2023-09-13 PROCEDURE — 77066 DX MAMMO INCL CAD BI: CPT

## 2023-09-19 ENCOUNTER — APPOINTMENT (OUTPATIENT)
Dept: THORACIC SURGERY | Facility: CLINIC | Age: 68
End: 2023-09-19
Payer: MEDICARE

## 2023-09-19 ENCOUNTER — APPOINTMENT (OUTPATIENT)
Dept: PULMONOLOGY | Facility: CLINIC | Age: 68
End: 2023-09-19
Payer: MEDICARE

## 2023-09-19 VITALS — SYSTOLIC BLOOD PRESSURE: 102 MMHG | HEART RATE: 89 BPM | DIASTOLIC BLOOD PRESSURE: 67 MMHG | OXYGEN SATURATION: 96 %

## 2023-09-19 VITALS
BODY MASS INDEX: 29.41 KG/M2 | HEIGHT: 66 IN | OXYGEN SATURATION: 95 % | WEIGHT: 183 LBS | HEART RATE: 88 BPM | SYSTOLIC BLOOD PRESSURE: 127 MMHG | DIASTOLIC BLOOD PRESSURE: 79 MMHG | RESPIRATION RATE: 16 BRPM

## 2023-09-19 DIAGNOSIS — G47.00 INSOMNIA, UNSPECIFIED: ICD-10-CM

## 2023-09-19 PROCEDURE — 99214 OFFICE O/P EST MOD 30 MIN: CPT | Mod: 25

## 2023-09-19 PROCEDURE — 90662 IIV NO PRSV INCREASED AG IM: CPT

## 2023-09-19 PROCEDURE — 94010 BREATHING CAPACITY TEST: CPT

## 2023-09-19 PROCEDURE — 99213 OFFICE O/P EST LOW 20 MIN: CPT

## 2023-09-19 PROCEDURE — G0008: CPT

## 2023-10-03 ENCOUNTER — NON-APPOINTMENT (OUTPATIENT)
Age: 68
End: 2023-10-03

## 2023-10-22 ENCOUNTER — RX RENEWAL (OUTPATIENT)
Age: 68
End: 2023-10-22

## 2023-10-22 RX ORDER — MONTELUKAST 10 MG/1
10 TABLET, FILM COATED ORAL
Qty: 90 | Refills: 3 | Status: ACTIVE | COMMUNITY
Start: 2019-10-18 | End: 1900-01-01

## 2023-11-07 ENCOUNTER — TRANSCRIPTION ENCOUNTER (OUTPATIENT)
Age: 68
End: 2023-11-07

## 2023-11-13 ENCOUNTER — APPOINTMENT (OUTPATIENT)
Dept: ENDOCRINOLOGY | Facility: CLINIC | Age: 68
End: 2023-11-13

## 2023-11-20 ENCOUNTER — TRANSCRIPTION ENCOUNTER (OUTPATIENT)
Age: 68
End: 2023-11-20

## 2023-12-10 ENCOUNTER — RX RENEWAL (OUTPATIENT)
Age: 68
End: 2023-12-10

## 2023-12-14 NOTE — PROCEDURE
What Type Of Note Output Would You Prefer (Optional)?: Standard Output What Is The Reason For Today's Visit?: Full Body Skin Examination What Is The Reason For Today's Visit? (Being Monitored For X): the development of a new lesion How Severe Are Your Spot(S)?: moderate [FreeTextEntry1] : \par  Xray Chest 2 Views PA/Lat             Final\par \par No Documents Attached\par \par \par \par \par   \par Chest x-ray PA and lateral views performed in my office today showed clear lungs, no evidence of infiltrates or pleural effusions. \par \par \par  Ordered by: STEFAN GONZALEZ       Collected/Examined: 25Oct2022 05:08PM       \par Verified by: STEFAN GONZALEZ 25Oct2022 05:53PM       \par  Result Communication: No patient communication needed at this time;\par Stage: Final       \par  Performed at: In Office       Performed by: STEFAN GONZALEZ       Resulted: 25Oct2022 05:08PM       Last Updated: 25Oct2022 05:53PM       Accession: [id number]

## 2023-12-21 RX ORDER — ATORVASTATIN CALCIUM 40 MG/1
40 TABLET, FILM COATED ORAL
Qty: 90 | Refills: 1 | Status: ACTIVE | COMMUNITY
Start: 2022-06-12 | End: 1900-01-01

## 2023-12-21 RX ORDER — POLYSACCHARIDE-IRON COMPLEX 150 MG/1
150 CAPSULE ORAL
Qty: 90 | Refills: 0 | Status: ACTIVE | COMMUNITY
Start: 2022-01-18 | End: 1900-01-01

## 2024-01-02 ENCOUNTER — APPOINTMENT (OUTPATIENT)
Dept: CARDIOLOGY | Facility: CLINIC | Age: 69
End: 2024-01-02
Payer: MEDICARE

## 2024-01-02 ENCOUNTER — NON-APPOINTMENT (OUTPATIENT)
Age: 69
End: 2024-01-02

## 2024-01-02 VITALS
OXYGEN SATURATION: 99 % | HEART RATE: 80 BPM | WEIGHT: 176.06 LBS | DIASTOLIC BLOOD PRESSURE: 70 MMHG | BODY MASS INDEX: 28.3 KG/M2 | TEMPERATURE: 98.7 F | HEIGHT: 66 IN | RESPIRATION RATE: 16 BRPM | SYSTOLIC BLOOD PRESSURE: 134 MMHG

## 2024-01-02 DIAGNOSIS — E61.1 IRON DEFICIENCY: ICD-10-CM

## 2024-01-02 DIAGNOSIS — Z13.228 ENCOUNTER FOR SCREENING FOR OTHER METABOLIC DISORDERS: ICD-10-CM

## 2024-01-02 DIAGNOSIS — J34.89 OTHER SPECIFIED DISORDERS OF NOSE AND NASAL SINUSES: ICD-10-CM

## 2024-01-02 DIAGNOSIS — C61 MALIGNANT NEOPLASM OF PROSTATE: ICD-10-CM

## 2024-01-02 DIAGNOSIS — C34.90 MALIGNANT NEOPLASM OF UNSPECIFIED PART OF UNSPECIFIED BRONCHUS OR LUNG: ICD-10-CM

## 2024-01-02 DIAGNOSIS — I25.10 ATHEROSCLEROTIC HEART DISEASE OF NATIVE CORONARY ARTERY W/OUT ANGINA PECTORIS: ICD-10-CM

## 2024-01-02 DIAGNOSIS — M54.9 DORSALGIA, UNSPECIFIED: ICD-10-CM

## 2024-01-02 DIAGNOSIS — M06.9 RHEUMATOID ARTHRITIS, UNSPECIFIED: ICD-10-CM

## 2024-01-02 PROCEDURE — 93000 ELECTROCARDIOGRAM COMPLETE: CPT

## 2024-01-02 PROCEDURE — 99214 OFFICE O/P EST MOD 30 MIN: CPT

## 2024-01-02 RX ORDER — LOSARTAN POTASSIUM 100 MG/1
100 TABLET, FILM COATED ORAL
Qty: 90 | Refills: 3 | Status: ACTIVE | COMMUNITY
Start: 2020-11-15 | End: 1900-01-01

## 2024-01-02 RX ORDER — AMOXICILLIN AND CLAVULANATE POTASSIUM 875; 125 MG/1; MG/1
875-125 TABLET, COATED ORAL
Qty: 20 | Refills: 0 | Status: ACTIVE | COMMUNITY
Start: 2024-01-02 | End: 1900-01-01

## 2024-01-02 RX ORDER — METHYLPREDNISOLONE 4 MG/1
4 TABLET ORAL
Qty: 1 | Refills: 0 | Status: ACTIVE | COMMUNITY
Start: 2024-01-02 | End: 1900-01-01

## 2024-01-02 NOTE — PHYSICAL EXAM

## 2024-01-02 NOTE — HISTORY OF PRESENT ILLNESS
[FreeTextEntry1] : This is a 68 year y/o male with a PMHx of Adenocarcinoma of the lung and prostate, Obesity presents today for follow up. Pt is s/p lexiscan with normal myocardial perfusion study 8/2023. Pt reports sinus pressure since November and a headache.  Patient denies chest pain, dyspnea, palpitations, dizziness, syncope, changes in bowel/bladder habits or appetite

## 2024-01-03 LAB
ALBUMIN SERPL ELPH-MCNC: 4.2 G/DL
ALP BLD-CCNC: 79 U/L
ALT SERPL-CCNC: 28 U/L
ANION GAP SERPL CALC-SCNC: 12 MMOL/L
APPEARANCE: CLEAR
AST SERPL-CCNC: 24 U/L
BACTERIA: NEGATIVE /HPF
BASOPHILS # BLD AUTO: 0.06 K/UL
BASOPHILS NFR BLD AUTO: 0.7 %
BILIRUB DIRECT SERPL-MCNC: 0.1 MG/DL
BILIRUB INDIRECT SERPL-MCNC: 0.2 MG/DL
BILIRUB SERPL-MCNC: 0.3 MG/DL
BILIRUBIN URINE: NEGATIVE
BLOOD URINE: NEGATIVE
BUN SERPL-MCNC: 13 MG/DL
CALCIUM SERPL-MCNC: 9.5 MG/DL
CAST: 0 /LPF
CHLORIDE SERPL-SCNC: 103 MMOL/L
CHOLEST SERPL-MCNC: 204 MG/DL
CK SERPL-CCNC: 109 U/L
CO2 SERPL-SCNC: 26 MMOL/L
COLOR: YELLOW
CREAT SERPL-MCNC: 1.03 MG/DL
CREAT SPEC-SCNC: 193 MG/DL
EGFR: 80 ML/MIN/1.73M2
EOSINOPHIL # BLD AUTO: 0.78 K/UL
EOSINOPHIL NFR BLD AUTO: 8.8 %
EPITHELIAL CELLS: 0 /HPF
ESTIMATED AVERAGE GLUCOSE: 114 MG/DL
FERRITIN SERPL-MCNC: 305 NG/ML
FOLATE SERPL-MCNC: 13.6 NG/ML
GLUCOSE QUALITATIVE U: NEGATIVE MG/DL
GLUCOSE SERPL-MCNC: 95 MG/DL
HBA1C MFR BLD HPLC: 5.6 %
HCT VFR BLD CALC: 43.1 %
HDLC SERPL-MCNC: 77 MG/DL
HGB BLD-MCNC: 13 G/DL
IMM GRANULOCYTES NFR BLD AUTO: 0.2 %
IRON SATN MFR SERPL: 28 %
IRON SERPL-MCNC: 83 UG/DL
KETONES URINE: NEGATIVE MG/DL
LDLC SERPL CALC-MCNC: 113 MG/DL
LDLC SERPL DIRECT ASSAY-MCNC: 116 MG/DL
LEUKOCYTE ESTERASE URINE: NEGATIVE
LYMPHOCYTES # BLD AUTO: 3.47 K/UL
LYMPHOCYTES NFR BLD AUTO: 39.2 %
MAN DIFF?: NORMAL
MCHC RBC-ENTMCNC: 26.2 PG
MCHC RBC-ENTMCNC: 30.2 GM/DL
MCV RBC AUTO: 86.9 FL
MICROALBUMIN 24H UR DL<=1MG/L-MCNC: <1.2 MG/DL
MICROALBUMIN/CREAT 24H UR-RTO: NORMAL MG/G
MICROSCOPIC-UA: NORMAL
MONOCYTES # BLD AUTO: 0.92 K/UL
MONOCYTES NFR BLD AUTO: 10.4 %
NEUTROPHILS # BLD AUTO: 3.61 K/UL
NEUTROPHILS NFR BLD AUTO: 40.7 %
NITRITE URINE: NEGATIVE
NONHDLC SERPL-MCNC: 127 MG/DL
PH URINE: 5.5
PLATELET # BLD AUTO: 287 K/UL
POTASSIUM SERPL-SCNC: 4.1 MMOL/L
PROT SERPL-MCNC: 7.7 G/DL
PROTEIN URINE: NEGATIVE MG/DL
RBC # BLD: 4.96 M/UL
RBC # FLD: 14.7 %
RED BLOOD CELLS URINE: 1 /HPF
SODIUM SERPL-SCNC: 141 MMOL/L
SPECIFIC GRAVITY URINE: 1.02
T4 FREE SERPL-MCNC: 1.2 NG/DL
TIBC SERPL-MCNC: 293 UG/DL
TRANSFERRIN SERPL-MCNC: 228 MG/DL
TRIGL SERPL-MCNC: 79 MG/DL
TSH SERPL-ACNC: 1.14 UIU/ML
UIBC SERPL-MCNC: 210 UG/DL
UROBILINOGEN URINE: 0.2 MG/DL
VIT B12 SERPL-MCNC: 818 PG/ML
WBC # FLD AUTO: 8.86 K/UL
WHITE BLOOD CELLS URINE: 0 /HPF

## 2024-02-11 ENCOUNTER — RX RENEWAL (OUTPATIENT)
Age: 69
End: 2024-02-11

## 2024-02-11 RX ORDER — METOPROLOL TARTRATE 50 MG/1
50 TABLET, FILM COATED ORAL
Qty: 270 | Refills: 3 | Status: ACTIVE | COMMUNITY
Start: 2020-10-04 | End: 1900-01-01

## 2024-02-23 RX ORDER — TRAZODONE HYDROCHLORIDE 50 MG/1
50 TABLET ORAL
Qty: 90 | Refills: 3 | Status: ACTIVE | COMMUNITY
Start: 2023-05-26 | End: 1900-01-01

## 2024-03-05 NOTE — DISCUSSION/SUMMARY
[FreeTextEntry1] : Lung ca stable seeing Zeltsman\par TAYA on CPAP now air fit 20 nasal mask.\par COPD clinically stable. \par \par flu and Pneumovax given\par \par  125

## 2024-03-17 ENCOUNTER — RX RENEWAL (OUTPATIENT)
Age: 69
End: 2024-03-17

## 2024-03-17 RX ORDER — SEMAGLUTIDE 0.68 MG/ML
2 INJECTION, SOLUTION SUBCUTANEOUS
Qty: 3 | Refills: 1 | Status: ACTIVE | COMMUNITY
Start: 2022-10-27 | End: 1900-01-01

## 2024-03-19 ENCOUNTER — APPOINTMENT (OUTPATIENT)
Dept: PULMONOLOGY | Facility: CLINIC | Age: 69
End: 2024-03-19
Payer: MEDICARE

## 2024-03-19 VITALS
HEART RATE: 73 BPM | RESPIRATION RATE: 16 BRPM | SYSTOLIC BLOOD PRESSURE: 139 MMHG | OXYGEN SATURATION: 98 % | DIASTOLIC BLOOD PRESSURE: 82 MMHG

## 2024-03-19 DIAGNOSIS — J45.40 MODERATE PERSISTENT ASTHMA, UNCOMPLICATED: ICD-10-CM

## 2024-03-19 DIAGNOSIS — J44.9 CHRONIC OBSTRUCTIVE PULMONARY DISEASE, UNSPECIFIED: ICD-10-CM

## 2024-03-19 DIAGNOSIS — G47.33 OBSTRUCTIVE SLEEP APNEA (ADULT) (PEDIATRIC): ICD-10-CM

## 2024-03-19 PROCEDURE — 94727 GAS DIL/WSHOT DETER LNG VOL: CPT

## 2024-03-19 PROCEDURE — 99214 OFFICE O/P EST MOD 30 MIN: CPT | Mod: 25

## 2024-03-19 PROCEDURE — 94010 BREATHING CAPACITY TEST: CPT

## 2024-03-19 PROCEDURE — 94729 DIFFUSING CAPACITY: CPT

## 2024-03-19 NOTE — ASSESSMENT
[FreeTextEntry1] : Continue auto cpap Resmed with air fit 20 F 20 med full face Continue Symbicort 160 2 puffs PRN. Continue program of wt. loss/exercise Seeing Dr Osborne.  CT as per CT surgery. Continue trazodone as needed. Follow-up in 6 months or sooner on a as needed basis.    35 minutes spent in evaluation management and discussion

## 2024-03-19 NOTE — HISTORY OF PRESENT ILLNESS
[TextBox_4] : Feeling OK Appt CT surgery June Using CPAP. Taking infrequent Symbicort. No cough or wheeze. No exacerbations On Ozempic with prior wt. loss not regained.  Issues with lower back.

## 2024-03-19 NOTE — DISCUSSION/SUMMARY
[FreeTextEntry1] : Status post resection of lung carcinoma.  Clinically stable. TAYA severe AHI RDI 57 on CPAP good compliance  COPD clinically and functionally stable.   Overweight Insomnia cont trazodone prn

## 2024-03-19 NOTE — PHYSICAL EXAM
[No Acute Distress] : no acute distress [Normal Oropharynx] : normal oropharynx [No Neck Mass] : no neck mass [No JVD] : no jvd [Normal S1, S2] : normal s1, s2 [No Murmurs] : no murmurs [Clear to Auscultation Bilaterally] : clear to auscultation bilaterally [Normal to Percussion] : normal to percussion [No HSM] : no hsm [Benign] : benign [No Clubbing] : no clubbing [No Edema] : no edema [No Cyanosis] : no cyanosis [TextBox_2] : Overweight

## 2024-03-19 NOTE — PROCEDURE
[FreeTextEntry1] : 03/19/2024 Pulmonary function testing These data demonstrate a mild obstructive ventilatory deficit. There is elevation in the RV/TLC ratio indicative of possible air trapping. There is a mild diffusion impairment. Corrects to normal with lung volume correction  Essentially stable function compared to prior examinations.  Cpap data downloaded and discussed with patient.  Good response.  Some days without compliance.

## 2024-03-22 ENCOUNTER — NON-APPOINTMENT (OUTPATIENT)
Age: 69
End: 2024-03-22

## 2024-04-01 ENCOUNTER — APPOINTMENT (OUTPATIENT)
Dept: ORTHOPEDIC SURGERY | Facility: CLINIC | Age: 69
End: 2024-04-01
Payer: MEDICARE

## 2024-04-01 ENCOUNTER — NON-APPOINTMENT (OUTPATIENT)
Age: 69
End: 2024-04-01

## 2024-04-01 VITALS
HEIGHT: 66 IN | WEIGHT: 180 LBS | BODY MASS INDEX: 28.93 KG/M2 | SYSTOLIC BLOOD PRESSURE: 123 MMHG | DIASTOLIC BLOOD PRESSURE: 76 MMHG | HEART RATE: 76 BPM

## 2024-04-01 PROCEDURE — 72100 X-RAY EXAM L-S SPINE 2/3 VWS: CPT

## 2024-04-01 PROCEDURE — 99214 OFFICE O/P EST MOD 30 MIN: CPT

## 2024-04-01 PROCEDURE — 72040 X-RAY EXAM NECK SPINE 2-3 VW: CPT

## 2024-04-01 PROCEDURE — 99204 OFFICE O/P NEW MOD 45 MIN: CPT

## 2024-04-01 NOTE — DISCUSSION/SUMMARY
[de-identified] : Cervical and lumbar disc degenerative disease. +bilateral rotator cuff pathology. Hx lung cancer. Discussed all options. Lumbar and cervical MRI referrals. F/U with Dr. Marti for bilateral shoulder evaluation. F/U after MRIs. Will get thoracic MRI in July 2024. All options discussed including rest, medicine, home exercise, acupuncture, Chiropractic care, Physical Therapy, Pain management, and last resort surgery. All questions were answered, all alternatives discussed, and the patient is in complete agreement with the treatment plan which the patient contributed to and discussed with me through the shared decision-making process. Follow-up appointment as instructed. Any issues and the patient will call or come in sooner. Wife agrees with plan.

## 2024-04-01 NOTE — ADDENDUM
[FreeTextEntry1] : This note was written by Berny Callejas on 04/01/2024 acting as scribe for Dr. Nasir Vidal M.D.  I, Nasir Vidal MD, have read and attest that all the information, medical decision making and discharge instructions within are true and accurate.

## 2024-04-01 NOTE — PHYSICAL EXAM
[Normal] : Gait: normal [Pronator Drift] : negative pronator drift [Cuevas's Sign] : negative Cuevas's sign [SLR] : negative straight leg raise [de-identified] : 5 out of 5 motor strength, sensation is intact and symmetrical full range of motion flexion extension and rotation, no palpatory tenderness full range of motion of hips knees shoulders and elbows (all four extremities), no atrophy, negative straight leg raise, no pathological reflexes, no swelling, normal ambulation, no apparent distress skin is intact, no palpable lymph nodes, no upper or lower extremity instability, alert and oriented x3 and normal mood. Normal finger-to nose test. No upper motor neuron findings. +bilateral rotator cuff pathology. [de-identified] : XR AP Lat Cervical 04/01/2024 -Cervical degenerative disc disease- reviewed with patient.     XR AP Lat Lumbar 04/01/2024 -Lumbar disc degenerative disease- reviewed with patient.
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
## 2024-04-05 ENCOUNTER — APPOINTMENT (OUTPATIENT)
Dept: ENDOCRINOLOGY | Facility: CLINIC | Age: 69
End: 2024-04-05
Payer: MEDICARE

## 2024-04-05 VITALS
TEMPERATURE: 98.5 F | HEART RATE: 93 BPM | WEIGHT: 182.5 LBS | DIASTOLIC BLOOD PRESSURE: 60 MMHG | OXYGEN SATURATION: 97 % | HEIGHT: 66 IN | BODY MASS INDEX: 29.33 KG/M2 | SYSTOLIC BLOOD PRESSURE: 110 MMHG

## 2024-04-05 DIAGNOSIS — E66.9 OBESITY, UNSPECIFIED: ICD-10-CM

## 2024-04-05 DIAGNOSIS — E11.9 TYPE 2 DIABETES MELLITUS W/OUT COMPLICATIONS: ICD-10-CM

## 2024-04-05 DIAGNOSIS — I10 ESSENTIAL (PRIMARY) HYPERTENSION: ICD-10-CM

## 2024-04-05 DIAGNOSIS — E78.00 PURE HYPERCHOLESTEROLEMIA, UNSPECIFIED: ICD-10-CM

## 2024-04-05 LAB
GLUCOSE BLDC GLUCOMTR-MCNC: 110
HBA1C MFR BLD HPLC: 5.7

## 2024-04-05 PROCEDURE — 82962 GLUCOSE BLOOD TEST: CPT

## 2024-04-05 PROCEDURE — 83036 HEMOGLOBIN GLYCOSYLATED A1C: CPT | Mod: QW

## 2024-04-05 PROCEDURE — 36415 COLL VENOUS BLD VENIPUNCTURE: CPT

## 2024-04-05 PROCEDURE — G2211 COMPLEX E/M VISIT ADD ON: CPT

## 2024-04-05 PROCEDURE — 99214 OFFICE O/P EST MOD 30 MIN: CPT

## 2024-04-08 LAB
ALBUMIN SERPL ELPH-MCNC: 4.1 G/DL
ALBUMIN SERPL ELPH-MCNC: 4.3 G/DL
ALP BLD-CCNC: 78 U/L
ALP BLD-CCNC: 81 U/L
ALT SERPL-CCNC: 22 U/L
ALT SERPL-CCNC: 38 U/L
ANION GAP SERPL CALC-SCNC: 13 MMOL/L
ANION GAP SERPL CALC-SCNC: 14 MMOL/L
APPEARANCE: CLEAR
APPEARANCE: CLEAR
AST SERPL-CCNC: 20 U/L
AST SERPL-CCNC: 26 U/L
BACTERIA: NEGATIVE
BACTERIA: NEGATIVE /HPF
BASOPHILS # BLD AUTO: 0.03 K/UL
BASOPHILS # BLD AUTO: 0.05 K/UL
BASOPHILS NFR BLD AUTO: 0.4 %
BASOPHILS NFR BLD AUTO: 0.5 %
BILIRUB DIRECT SERPL-MCNC: 0.1 MG/DL
BILIRUB INDIRECT SERPL-MCNC: 0.2 MG/DL
BILIRUB SERPL-MCNC: 0.2 MG/DL
BILIRUB SERPL-MCNC: 0.4 MG/DL
BILIRUBIN URINE: NEGATIVE
BILIRUBIN URINE: NEGATIVE
BLOOD URINE: NEGATIVE
BLOOD URINE: NEGATIVE
BUN SERPL-MCNC: 16 MG/DL
BUN SERPL-MCNC: 18 MG/DL
CALCIUM SERPL-MCNC: 9.3 MG/DL
CALCIUM SERPL-MCNC: 9.7 MG/DL
CAST: 2 /LPF
CCP AB SER IA-ACNC: 24 UNITS
CHLORIDE SERPL-SCNC: 103 MMOL/L
CHLORIDE SERPL-SCNC: 105 MMOL/L
CHOLEST SERPL-MCNC: 176 MG/DL
CHOLEST SERPL-MCNC: 186 MG/DL
CK SERPL-CCNC: 105 U/L
CK SERPL-CCNC: 269 U/L
CO2 SERPL-SCNC: 22 MMOL/L
CO2 SERPL-SCNC: 25 MMOL/L
COLOR: NORMAL
COLOR: NORMAL
CREAT SERPL-MCNC: 1.1 MG/DL
CREAT SERPL-MCNC: 1.23 MG/DL
CREAT SPEC-SCNC: 219 MG/DL
CRP SERPL-MCNC: 10 MG/L
DSDNA AB SER-ACNC: <12 IU/ML
EGFR: 64 ML/MIN/1.73M2
EGFR: 74 ML/MIN/1.73M2
EOSINOPHIL # BLD AUTO: 0.56 K/UL
EOSINOPHIL # BLD AUTO: 0.8 K/UL
EOSINOPHIL NFR BLD AUTO: 7 %
EOSINOPHIL NFR BLD AUTO: 8 %
EPITHELIAL CELLS: 1 /HPF
ERYTHROCYTE [SEDIMENTATION RATE] IN BLOOD BY WESTERGREN METHOD: 34 MM/HR
ESTIMATED AVERAGE GLUCOSE: 154 MG/DL
FERRITIN SERPL-MCNC: 187 NG/ML
FOLATE SERPL-MCNC: >20 NG/ML
GLUCOSE QUALITATIVE U: NEGATIVE
GLUCOSE QUALITATIVE U: NEGATIVE MG/DL
GLUCOSE SERPL-MCNC: 109 MG/DL
GLUCOSE SERPL-MCNC: 93 MG/DL
HBA1C MFR BLD HPLC: 7 %
HCT VFR BLD CALC: 40 %
HCT VFR BLD CALC: 40.3 %
HDLC SERPL-MCNC: 64 MG/DL
HDLC SERPL-MCNC: 76 MG/DL
HGB BLD-MCNC: 12.3 G/DL
HGB BLD-MCNC: 12.6 G/DL
HYALINE CASTS: 0 /LPF
IMM GRANULOCYTES NFR BLD AUTO: 0.3 %
IMM GRANULOCYTES NFR BLD AUTO: 0.3 %
IRON SATN MFR SERPL: 28 %
IRON SERPL-MCNC: 84 UG/DL
KETONES URINE: NEGATIVE
KETONES URINE: NEGATIVE MG/DL
LDLC SERPL CALC-MCNC: 67 MG/DL
LDLC SERPL CALC-MCNC: 88 MG/DL
LEUKOCYTE ESTERASE URINE: NEGATIVE
LEUKOCYTE ESTERASE URINE: NEGATIVE
LYMPHOCYTES # BLD AUTO: 2.87 K/UL
LYMPHOCYTES # BLD AUTO: 2.91 K/UL
LYMPHOCYTES NFR BLD AUTO: 29 %
LYMPHOCYTES NFR BLD AUTO: 35.9 %
MAGNESIUM SERPL-MCNC: 2.2 MG/DL
MAN DIFF?: NORMAL
MAN DIFF?: NORMAL
MCHC RBC-ENTMCNC: 26.3 PG
MCHC RBC-ENTMCNC: 26.6 PG
MCHC RBC-ENTMCNC: 30.5 GM/DL
MCHC RBC-ENTMCNC: 31.5 GM/DL
MCV RBC AUTO: 84.4 FL
MCV RBC AUTO: 86.1 FL
MICROALBUMIN 24H UR DL<=1MG/L-MCNC: <1.2 MG/DL
MICROALBUMIN/CREAT 24H UR-RTO: NORMAL MG/G
MICROSCOPIC-UA: NORMAL
MICROSCOPIC-UA: NORMAL
MONOCYTES # BLD AUTO: 0.85 K/UL
MONOCYTES # BLD AUTO: 1.2 K/UL
MONOCYTES NFR BLD AUTO: 10.6 %
MONOCYTES NFR BLD AUTO: 12 %
NEUTROPHILS # BLD AUTO: 3.66 K/UL
NEUTROPHILS # BLD AUTO: 5.04 K/UL
NEUTROPHILS NFR BLD AUTO: 45.8 %
NEUTROPHILS NFR BLD AUTO: 50.2 %
NITRITE URINE: NEGATIVE
NITRITE URINE: NEGATIVE
NONHDLC SERPL-MCNC: 110 MG/DL
NONHDLC SERPL-MCNC: 112 MG/DL
PH URINE: 6.5
PH URINE: 6.5
PLATELET # BLD AUTO: 275 K/UL
PLATELET # BLD AUTO: 284 K/UL
POTASSIUM SERPL-SCNC: 4 MMOL/L
POTASSIUM SERPL-SCNC: 4.4 MMOL/L
PROT SERPL-MCNC: 7 G/DL
PROT SERPL-MCNC: 7.1 G/DL
PROTEIN URINE: NEGATIVE MG/DL
PROTEIN URINE: NORMAL
RBC # BLD: 4.68 M/UL
RBC # BLD: 4.74 M/UL
RBC # FLD: 14 %
RBC # FLD: 14.6 %
RED BLOOD CELLS URINE: 1 /HPF
RED BLOOD CELLS URINE: 1 /HPF
RF+CCP IGG SER-IMP: ABNORMAL
RHEUMATOID FACT SER QL: 92 IU/ML
SODIUM SERPL-SCNC: 141 MMOL/L
SODIUM SERPL-SCNC: 141 MMOL/L
SPECIFIC GRAVITY URINE: 1.02
SPECIFIC GRAVITY URINE: 1.03
SQUAMOUS EPITHELIAL CELLS: 0 /HPF
T4 FREE SERPL-MCNC: 1.2 NG/DL
TIBC SERPL-MCNC: 305 UG/DL
TRANSFERRIN SERPL-MCNC: 224 MG/DL
TRIGL SERPL-MCNC: 120 MG/DL
TRIGL SERPL-MCNC: 279 MG/DL
TSH SERPL-ACNC: 0.85 UIU/ML
UIBC SERPL-MCNC: 221 UG/DL
UROBILINOGEN URINE: 0.2 MG/DL
UROBILINOGEN URINE: NORMAL
VIT B12 SERPL-MCNC: 862 PG/ML
WBC # FLD AUTO: 10.03 K/UL
WBC # FLD AUTO: 7.99 K/UL
WHITE BLOOD CELLS URINE: 0 /HPF
WHITE BLOOD CELLS URINE: 1 /HPF

## 2024-04-11 NOTE — ED PROVIDER NOTE - CADM POA CENTRAL LINE
From: Horace Otero  To: Bonita WOODALL Page  Sent: 4/11/2024 9:34 AM CDT  Subject: Pregnancy/ medication question     Hi Doctor Page,  I am writing to you for some additional guidance.    I appear to be pregnant. This was unplanned and not exactly wanted. I have called your office and they have sent in an order for me to get a blood test to determine how far along I am because of my irregular periods.    I do not know if we plan to keep it. However, as I don’t know I want to be safe. I am on 2 other medications other than birth control. 60 mg of Vyvanse and 20 mg of Lexapro. Do I need to stop taking either of these medications?    Any guidance you can provide is very much appreciated. Thank you,    Horace Otero    No

## 2024-04-14 ENCOUNTER — APPOINTMENT (OUTPATIENT)
Dept: MRI IMAGING | Facility: IMAGING CENTER | Age: 69
End: 2024-04-14
Payer: MEDICARE

## 2024-04-14 ENCOUNTER — OUTPATIENT (OUTPATIENT)
Dept: OUTPATIENT SERVICES | Facility: HOSPITAL | Age: 69
LOS: 1 days | End: 2024-04-14
Payer: MEDICARE

## 2024-04-14 DIAGNOSIS — Z98.89 OTHER SPECIFIED POSTPROCEDURAL STATES: Chronic | ICD-10-CM

## 2024-04-14 DIAGNOSIS — M51.34 OTHER INTERVERTEBRAL DISC DEGENERATION, THORACIC REGION: ICD-10-CM

## 2024-04-14 PROCEDURE — 72148 MRI LUMBAR SPINE W/O DYE: CPT

## 2024-04-14 PROCEDURE — 72141 MRI NECK SPINE W/O DYE: CPT | Mod: 26,MH

## 2024-04-14 PROCEDURE — 72148 MRI LUMBAR SPINE W/O DYE: CPT | Mod: 26,MH

## 2024-04-14 PROCEDURE — 72141 MRI NECK SPINE W/O DYE: CPT
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
## 2024-04-17 NOTE — REASON FOR VISIT
[Home] : at home, [unfilled] , at the time of the visit. [Medical Office: ([facility])___] : at the medical office located in  [Patient] : the patient [Follow-Up Visit] : a follow-up visit for

## 2024-04-17 NOTE — PHYSICAL EXAM
[Normal] : Gait: normal [Cuevas's Sign] : negative Cuevas's sign [Pronator Drift] : negative pronator drift [SLR] : negative straight leg raise [de-identified] : 5 out of 5 motor strength, sensation is intact and symmetrical full range of motion flexion extension and rotation, no palpatory tenderness full range of motion of hips knees shoulders and elbows (all four extremities), no atrophy, negative straight leg raise, no pathological reflexes, no swelling, normal ambulation, no apparent distress skin is intact, no palpable lymph nodes, no upper or lower extremity instability, alert and oriented x3 and normal mood. Normal finger-to nose test. No upper motor neuron findings. +bilateral rotator cuff pathology. [de-identified] : MRI Cervical 4/14/24 -    MRI Lumbar 4/14/24 -      XR AP Lat Cervical 04/01/2024 -Cervical degenerative disc disease- reviewed with patient.     XR AP Lat Lumbar 04/01/2024 -Lumbar disc degenerative disease- reviewed with patient.

## 2024-04-17 NOTE — DISCUSSION/SUMMARY
[de-identified] : Cervical and lumbar disc degenerative disease. +bilateral rotator cuff pathology. Hx lung cancer. Discussed all options. Lumbar and cervical MRI referrals. F/U with Dr. Marti for bilateral shoulder evaluation. F/U after MRIs. Will get thoracic MRI in July 2024. All options discussed including rest, medicine, home exercise, acupuncture, Chiropractic care, Physical Therapy, Pain management, and last resort surgery. All questions were answered, all alternatives discussed, and the patient is in complete agreement with the treatment plan which the patient contributed to and discussed with me through the shared decision-making process. Follow-up appointment as instructed. Any issues and the patient will call or come in sooner. Wife agrees with plan.

## 2024-04-19 ENCOUNTER — APPOINTMENT (OUTPATIENT)
Dept: ORTHOPEDIC SURGERY | Facility: CLINIC | Age: 69
End: 2024-04-19
Payer: MEDICARE

## 2024-04-19 PROCEDURE — 99214 OFFICE O/P EST MOD 30 MIN: CPT

## 2024-04-21 ENCOUNTER — RX RENEWAL (OUTPATIENT)
Age: 69
End: 2024-04-21

## 2024-04-21 RX ORDER — FLUTICASONE PROPIONATE 50 UG/1
50 SPRAY, METERED NASAL
Qty: 16 | Refills: 1 | Status: ACTIVE | COMMUNITY
Start: 2024-01-02 | End: 1900-01-01

## 2024-04-21 RX ORDER — PANTOPRAZOLE 40 MG/1
40 TABLET, DELAYED RELEASE ORAL
Qty: 90 | Refills: 0 | Status: ACTIVE | COMMUNITY
Start: 2019-04-12 | End: 1900-01-01

## 2024-04-21 RX ORDER — HYDRALAZINE HYDROCHLORIDE 50 MG/1
50 TABLET ORAL
Qty: 270 | Refills: 0 | Status: ACTIVE | COMMUNITY
Start: 2020-05-03 | End: 1900-01-01

## 2024-04-24 ENCOUNTER — APPOINTMENT (OUTPATIENT)
Dept: ORTHOPEDIC SURGERY | Facility: CLINIC | Age: 69
End: 2024-04-24
Payer: MEDICARE

## 2024-04-24 VITALS
HEART RATE: 88 BPM | BODY MASS INDEX: 29.25 KG/M2 | HEIGHT: 66 IN | SYSTOLIC BLOOD PRESSURE: 113 MMHG | WEIGHT: 182 LBS | DIASTOLIC BLOOD PRESSURE: 71 MMHG

## 2024-04-24 PROCEDURE — 73030 X-RAY EXAM OF SHOULDER: CPT | Mod: LT

## 2024-04-24 PROCEDURE — 99203 OFFICE O/P NEW LOW 30 MIN: CPT

## 2024-04-25 NOTE — HISTORY OF PRESENT ILLNESS
[de-identified] : 68 year old LHD gentleman presenting for initial evaluation for bilateral shoulder pain. He had left rotator cuff tear and had surgery in 2012 with Dr. Dusty Temple. He had a right torn rotator cuff as well but never had surgery for it. He notes anterior and posterior bilateral shoulder pain which radiates to the fingers bilaterally. He notes decreased strength in both arms. The pain is worse with lifting items. He is taking Aleve for the pain with mild relief. Heat sometimes relieves the pain. Denies numbness or tingling. Of note, he has a history of prostate cancer and lung cancer s/p right upper lobectomy.   The patient's past medical history, past surgical history, medications and allergies were reviewed by me today with the patient and documented accordingly. In addition, the patient's family and social history, which were noncontributory to this visit, were reviewed also.

## 2024-04-25 NOTE — DISCUSSION/SUMMARY
[de-identified] : 67 y/o male with bilateral shoulder pain; R>L.   Patient is s/p left RTCR 2012 at [facility] and presents for evaluation of bilateral shoulder pain.  Symptoms are consistent with rotator cuff dysfunction on both shoulders, and given persistent symptoms recommendations made for repeat imaging to evaluate for progression of right rotator cuff injury as well as to evaluate his postsurgical state of his left shoulder.   Discussed short-term and long-term outcomes as well as the goal of treatment to reduce pain and restore function. Nonsurgical treatment is typically first-line therapy that may take weeks to months to resolve symptoms; includes rest from overhead activities, NSAIDs, home exercise program versus physical therapy to restore normal strength/ROM/function of the shoulder, and possible corticosteroid injection. Also discussed the role of arthroscopic surgical intervention when nonsurgical treatment does not adequately relieve pain/inflammation.   MRI Rx given to patient to further delineate any internal structural derangement to the shoulder. This will allow for better understanding of the severity of disease, and allow for better stratification of treatment strategies (surgical vs. non-surgical). Patient is agreeable to further imaging.   Recommendation: Ice/NSAIDs as needed.  Activity modifications and avoidance.    Followup: After MRI

## 2024-04-25 NOTE — PHYSICAL EXAM
[de-identified] : RIght shoulder exam:   Inspection: No malalignment, No defects, No atrophy Skin: No masses, No lesions Neck: Negative Spurling, full ROM, no pain with ROM AROM: FF to 160, abduction to 90, ER to 60, IR to upper lumbar Painful arc ROM: Pain with terminal motion Tenderness: No bicipital tenderness, no tenderness to greater tuberosity/RTC insertion, no anterior shoulder/lesser tuberosity tenderness Strength: 5/5 ER, 5/5 IR in adduction, 4+/5 supraspinatus testing, negative Bonneville's test AC joint: No TTP/pain with cross arm testing Biceps: Speed Negative, Yergason Negative Impingement test: + Denton, + Neer Vasc: 2+ radial pulse Stability: Stable Neuro: AIN, PIN, Ulnar nerve intact to motor Sensation: Intact to light touch throughout Other findings: None.   Left shoulder exam:   Inspection: No malalignment, No defects, No atrophy Skin: No masses, No lesions Neck: Negative Spurling, full ROM, no pain with ROM AROM: FF to 180, abduction to 90, ER to 80, IR to mid lumbar Painful arc ROM: Pain with terminal motion Tenderness: No bicipital tenderness, no tenderness to greater tuberosity/RTC insertion, no anterior shoulder/lesser tuberosity tenderness Strength: 5/5 ER, 5/5 IR in adduction, 4+/5 supraspinatus testing, negative Bonneville's test AC joint: No TTP/pain with cross arm testing Biceps: Speed Negative, Yergason Negative Impingement test: + Denton, + Neer Vasc: 2+ radial pulse Stability: Stable Neuro: AIN, PIN, Ulnar nerve intact to motor Sensation: Intact to light touch throughout Other findings: None.  [de-identified] : The following radiographs were ordered and read by me during this patients visit. I reviewed each radiograph in detail with the patient and discussed the findings as highlighted below.   3 views of left shoulder were obtained today, 04/24/2024, that show no acute fracture or dislocation. There is no glenohumeral and mild AC joint degenerative change seen. Type II acromion. Os Acrominion. Post-surgical changes, evidence of RTC repair and biceps tenodesis.    3 views of right shoulder were obtained today, 04/24/2024, that show no acute fracture or dislocation. There is no glenohumeral and mild AC joint degenerative change seen. Type II acromion.  Os acromion.  There is no significant malalignment. No significant other obvious osseous abnormality, otherwise unremarkable.

## 2024-04-25 NOTE — ADDENDUM
[FreeTextEntry1] : This note was written by Maritza Campbell on 04/24/2024 acting solely as a scribe for Dr. Christophe Marti.  All medical record entries made by the Scribe were at my, Dr. Christophe Marti, direction and personally dictated by me on 04/24/2024. I have personally reviewed the chart and agree that the record accurately reflects my personal performance of the history, physical exam, assessment and plan.

## 2024-04-26 ENCOUNTER — APPOINTMENT (OUTPATIENT)
Dept: ORTHOPEDIC SURGERY | Facility: CLINIC | Age: 69
End: 2024-04-26
Payer: MEDICARE

## 2024-04-26 DIAGNOSIS — M51.36 OTHER INTERVERTEBRAL DISC DEGENERATION, LUMBAR REGION: ICD-10-CM

## 2024-04-26 DIAGNOSIS — M47.812 SPONDYLOSIS W/OUT MYELOPATHY OR RADICULOPATHY, CERVICAL REGION: ICD-10-CM

## 2024-04-26 PROCEDURE — 99214 OFFICE O/P EST MOD 30 MIN: CPT

## 2024-04-26 NOTE — PHYSICAL EXAM
[Normal] : Gait: normal [Cuevas's Sign] : negative Cuevas's sign [Pronator Drift] : negative pronator drift [SLR] : negative straight leg raise [de-identified] : Adequate motor strength, sensation is intact and symmetrical full range of motion flexion extension and rotation, full range of motion of hips knees shoulders and elbows (all four extremities), no atrophy, negative straight leg raise, no swelling, normal ambulation, no apparent distress skin is intact, no upper or lower extremity instability, alert and oriented x3 and normal mood. Normal finger-to nose test. Adequate heal walk and toe walk.  No upper motor neuron findings. +bilateral rotator cuff pathology. [de-identified] : EXAM: [id number] - MR SPINE CERVICAL  - ORDERED BY: SUPA AMAYA  EXAM: [id number] - MR SPINE LUMBAR  - ORDERED BY: SUPA AMAYA   PROCEDURE DATE:  04/14/2024    INTERPRETATION:  LUMBAR SPINE MRI  CLINICAL INFORMATION: Chronic pain of years duration. Lung and prostate cancer history.  TECHNIQUE: Multiplanar, multisequence MRI was obtained of the both the lumbar spine .  COMPARISON: Lumbar MRI dated 2/6/2021 and cervical MRI dated 3/12/2020.  FINDINGS:  OSSEOUS: Scattered mixed Modic degenerative endplate changes, most prominent and acute along the posterior margin at T11-T12 and L2-L3. Vertebral bodies are otherwise normal in signal, stature, and alignment without fracture, spondylolisthesis, or aggressive neoplasm.  LEVEL BY LEVEL ANALYSIS:  T11-T12: Annular disc osteophyte complex and mild to moderate facet arthrosis contribute to mild to moderate spinal canal stenosis and moderate to severe left worse than right neural canal stenosis and possible impingement of the exiting ipsilateral left-sided T11 nerve root, due to from field-of-view on axial sequences and otherwise incompletely evaluated on this nondedicated examination.  T12-L1: Mild to moderate facet arthrosis. Otherwise no significant abnormality.  L1-L2: Annular disc osteophyte complex and mild facet arthrosis contribute to mild bilateral neural canal stenosis. Spinal canal is adequate.  L2-L3: Annular disc osteophyte complex and mild to moderate facet arthrosis contribute to mild bilateral neural canal stenosis. Spinal canal is adequate.  L3-L4: Annular disc osteophyte complex and mild to moderate facet arthrosis contribute to mild bilateral neural canal stenosis. Spinal canal is adequate.  L4-L5: Annular disc osteophyte complex and moderate to severe right worse than left facet arthrosis contribute to mild to moderate right worse than left neural canal stenosis. Spinal canal is adequate.  L5-S1: Shallow posterior disc osteophyte complex with interval development of superimposed small right paracentral protrusion and severe bilateral facet arthrosis contribute to mild spinal canal stenosis with partial effacement the right lateral recess, abutment, and mild posterior displacement of the descending ipsilateral S1 nerve root. Mild bilateral neural canal stenosis.  GENERAL: Conus medullaris tip is anatomic in positioning. No intradural or extradural lesion.  IMPRESSION: 1.  At L5-S1 interval development of small right posterior paracentral disc protrusion contributing to abutment and mild posterior displacement of the descending ipsilateral S1 nerve root. Correlate for possible right-sided S1 radiculopathy. 2.  No high-grade lumbar spinal canal or neural canal stenosis. 3.  Interval worsening of now moderate discogenic spondylosis since 2021, including acute on chronic endplate stress reaction at the T11-T12 and L2-L3. 4.  Partially visualized moderate to severe left worse than right neural canal stenosis at T11-T12. Correlate for possible left-sided T11 radiculopathy consider follow-up dedicated thoracic MRI for further evaluation as clinically indicated.  ADDITIONAL CLINICAL INFORMATION: Other Condition see Clinical Info  --- End of Report ---     Cervical MRI.  GILBERTO EDWARDS MD; Attending Radiologist This document has been electronically signed. Apr 18 2024  8:24AM   FINDINGS:  OSSEOUS: Scattered degenerative endplate changes, most prominent and acute along the posterior margin at C5-C6, C7-T1, T11-T12 and L2-L3. Minimal degenerative anterolisthesis at C2-C3 and C7-T1 Vertebral bodies are otherwise normal in signal, stature, and alignment without fracture, spondylolisthesis, or aggressive neoplasm.  CERVICAL SPINE: Mild to moderate spinal canal stenosis at C3-C4 with posterior right posterior paracentral predominant disc osteophyte complex contributing to abutment and subtle flattening of the right anterior column of the spinal cord. Mild spinal canal stenosis at additional levels. Uncovertebral hypertrophy and facet arthrosis contribute to multilevel neural canal stenosis, most severe on the left side at C4-C5. Moderate neural canal stenosis bilaterally at C3-C4 and C5-C6 as well as on the left-sided C6-C7.      XR AP Lat Cervical 04/01/2024 -Cervical degenerative disc disease- reviewed with patient.     XR AP Lat Lumbar 04/01/2024 -Lumbar disc degenerative disease- reviewed with patient.
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
## 2024-04-26 NOTE — REASON FOR VISIT
[Home] : at home, [unfilled] , at the time of the visit. [Medical Office: ([facility])___] : at the medical office located in  [Patient] : the patient [Follow-Up Visit] : a follow-up visit for [Other Location: e.g. Home (Enter Location, City,State)___] : at [unfilled]

## 2024-04-26 NOTE — DISCUSSION/SUMMARY
[de-identified] : Cervical and lumbar disc degenerative disease. +bilateral rotator cuff pathology. Hx lung cancer. Discussed all options. Getting MRI shoulders. Will email Follow [facility] after MRIs. Will get thoracic MRI in July 2024. All options discussed including rest, medicine, home exercise, acupuncture, Chiropractic care, Physical Therapy, Pain management, and last resort surgery. All questions were answered, all alternatives discussed, and the patient is in complete agreement with the treatment plan which the patient contributed to and discussed with me through the shared decision-making process. Follow-up appointment as instructed. Any issues and the patient will call or come in sooner. Wife agrees with plan.

## 2024-05-01 NOTE — ADDENDUM
[FreeTextEntry1] : POCT HbA1c and glucose carried out in office today given diabetes diagnosis. Blood will be drawn in office today.  This note was written by Elham Fitzgerald on 04/05/2024 acting as scribe for Dr. Will Knott . I, Dr. Will Knott, have read and attest that all the information, medical decision making and discharge instructions within are true and accurate.

## 2024-05-01 NOTE — HISTORY OF PRESENT ILLNESS
[FreeTextEntry1] : Mr. WALLS is a 67 year old male who returns today in follow up with regard to a history of type 2 diabetes mellitus. The diabetes mellitus has been present for over four years.  There is no known history of retinopathy, nephropathy. He too denies any history of neuropathy.  Started on Ozempic October 2022. Continues on 0.5 mg sc weekly. Tolerating well and does help with appetite suppression with new batch, prior batch was not suppressing appetite. He was previously on 1 mg dose brieffly but went back down to 0. 5mg dose due to medication shoratage.   with prior bath weight went up to 180 , was down to 172 before that   He has lost 21 lbs since October 2022. Current weight: 182 lbs. Weight was as low at 172 lbs   Does not check Bg at home There has been no significant hypoglycemia. Optho neg-Dr. Lyric Ferris  Hx of Prostate Erik dx 2014-dormant -no treatment is being monitored. Too with hx of bph.  He denies any chest pain, sob, neurologic or ophthalmologic complaints. He too denies any new podiatric concerns. He is up to date with his ophthalmologic visit. Notes some itching and some numbness in hands bilaterally. UTD with with optho-no retinopathy. Following with Dr. Ferris.  POCT A1C returned today  5.7   ; previously  5.6 % on 1/2/24 POCT glucose returned today at   110 mg/dl   Additional medical history includes that of Prostate Ca dx 8/14-low grade Too with cad hx 9 stents 2 more placed last February, hyperlipidemia, Minor mi, htn and COPD and hx of adenocarcinoma lung 2013 s/p rt upper lobectomy., vitamin d deficiency

## 2024-05-12 ENCOUNTER — APPOINTMENT (OUTPATIENT)
Dept: MRI IMAGING | Facility: IMAGING CENTER | Age: 69
End: 2024-05-12
Payer: MEDICARE

## 2024-05-12 ENCOUNTER — OUTPATIENT (OUTPATIENT)
Dept: OUTPATIENT SERVICES | Facility: HOSPITAL | Age: 69
LOS: 1 days | End: 2024-05-12
Payer: MEDICARE

## 2024-05-12 DIAGNOSIS — M25.511 PAIN IN RIGHT SHOULDER: ICD-10-CM

## 2024-05-12 DIAGNOSIS — Z98.89 OTHER SPECIFIED POSTPROCEDURAL STATES: Chronic | ICD-10-CM

## 2024-05-12 PROCEDURE — 73221 MRI JOINT UPR EXTREM W/O DYE: CPT

## 2024-05-12 PROCEDURE — 73221 MRI JOINT UPR EXTREM W/O DYE: CPT | Mod: 26,RT,MH,76

## 2024-05-14 ENCOUNTER — TRANSCRIPTION ENCOUNTER (OUTPATIENT)
Age: 69
End: 2024-05-14

## 2024-05-14 NOTE — ED ADULT NURSE NOTE - ALCOHOL PRE SCREEN (AUDIT - C)
As discussed, you will need antibiotic coverage given  the risk factors as well as ongoing severity of symptoms. Sent in medications accordingly.     Will consider this visit for your Klonipin refills as well , when due for it but will need the controlled substance contract renewed possibly in 9/2023 which we will do follow up again.   
Statement Selected

## 2024-05-23 ENCOUNTER — APPOINTMENT (OUTPATIENT)
Dept: ORTHOPEDIC SURGERY | Facility: CLINIC | Age: 69
End: 2024-05-23
Payer: MEDICARE

## 2024-05-23 VITALS — HEIGHT: 66 IN | BODY MASS INDEX: 29.25 KG/M2 | WEIGHT: 182 LBS

## 2024-05-23 DIAGNOSIS — M25.512 PAIN IN RIGHT SHOULDER: ICD-10-CM

## 2024-05-23 DIAGNOSIS — M25.511 PAIN IN RIGHT SHOULDER: ICD-10-CM

## 2024-05-23 PROCEDURE — 99214 OFFICE O/P EST MOD 30 MIN: CPT

## 2024-05-28 NOTE — PHYSICAL EXAM
[de-identified] : RIght shoulder exam:   Inspection: No malalignment, No defects, No atrophy Skin: No masses, No lesions Neck: Negative Spurling, full ROM, no pain with ROM AROM: FF to 160, abduction to 90, ER to 60, IR to upper lumbar Painful arc ROM: Pain with terminal motion Tenderness: No bicipital tenderness, no tenderness to greater tuberosity/RTC insertion, no anterior shoulder/lesser tuberosity tenderness Strength: 5/5 ER, 5/5 IR in adduction, 4+/5 supraspinatus testing, negative Marin's test AC joint: No TTP/pain with cross arm testing Biceps: Speed Negative, Yergason Negative Impingement test: + Denton, + Neer Vasc: 2+ radial pulse Stability: Stable Neuro: AIN, PIN, Ulnar nerve intact to motor Sensation: Intact to light touch throughout Other findings: None.   Left shoulder exam:   Inspection: No malalignment, No defects, No atrophy Skin: No masses, No lesions Neck: Negative Spurling, full ROM, no pain with ROM AROM: FF to 180, abduction to 90, ER to 80, IR to mid lumbar Painful arc ROM: Pain with terminal motion Tenderness: No bicipital tenderness, no tenderness to greater tuberosity/RTC insertion, no anterior shoulder/lesser tuberosity tenderness Strength: 5/5 ER, 5/5 IR in adduction, 4+/5 supraspinatus testing, negative Marin's test AC joint: No TTP/pain with cross arm testing Biceps: Speed Negative, Yergason Negative Impingement test: + Denton, + Neer Vasc: 2+ radial pulse Stability: Stable Neuro: AIN, PIN, Ulnar nerve intact to motor Sensation: Intact to light touch throughout Other findings: None.  [de-identified] : 3 views of left shoulder were obtained 04/24/2024, that show no acute fracture or dislocation. There is no glenohumeral and mild AC joint degenerative change seen. Type II acromion. Os Acrominion. Post-surgical changes, evidence of RTC repair and biceps tenodesis.    3 views of right shoulder were obtained 04/24/2024, that show no acute fracture or dislocation. There is no glenohumeral and mild AC joint degenerative change seen. Type II acromion.  Os acromion.  There is no significant malalignment. No significant other obvious osseous abnormality, otherwise unremarkable.   MRI right shoulder dated 05/12/2024 shows severe tendinosis of the intra-articular portion of the long head biceps tendon with intrasubstance tearing at the biceps anchor. Findings suggestive of biceps tenosynovitis. Moderate partial thickness articular surface tearing extending from the mid insertion of the supraspinatus tendon to the junctional insertional fibers of the supraspinatus/infraspinatus tendon. Moderate tendinosis of the subscapularis tendon with intrasubstance tearing at the mid/cranial insertional fibers.  MRI left shoulder dated 05/12/2024 shows Small focus of calcific tendinosis of the caudal insertional fibers of the subscapularis tendon with mild peritendinous edema and subacromial/subdeltoid bursitis. Status post rotator cuff repair. The anterior to mid fibers of the infraspinatus tendon are attenuated and poorly defined. This is likely related to some combination of chronic recurrent tearing with granulation tissue and tendinosis. No retracted tear fluid-filled gap is seen.  We independently reviewed and discussed in detail the images and the radiologic reports with the patient.

## 2024-05-28 NOTE — DISCUSSION/SUMMARY
[de-identified] : 67 y/o male with bilateral shoulder pain; R>L.   MRI right and left shoulder reviewed. Tendinopathy throughtout the right shoulder, left shoulder shows small focus of calcific tendinosis of the caudal insertional fibers of the subscapularis tendon with mild peritendinous edema and subacromial/subdeltoid bursitis. No retear but chronic degeneration of the tendon. We discussed conservative mangement vs. surgical intervention. Conservative management is recommended at this time given mild symptoms.   Recommendations: Begin trial of PT, Rx given. Conservative modalities as above (overhead activity rest/activity avoidance until less symptomatic, ice, NSAIDs if able, strengthening and conditioning program).   Followup: If symptoms progress or persist for additional treatment as needed.

## 2024-05-28 NOTE — HISTORY OF PRESENT ILLNESS
[de-identified] : 68 year old LHD gentleman presenting for follow up of bilateral shoulder pain. He states that symptoms have remained same as He had left rotator cuff tear and had surgery in 2012 with Dr. Dusty Temple. He had a right torn rotator cuff as well but never had surgery for it. He notes anterior and posterior bilateral shoulder pain which radiates to the fingers bilaterally. He notes decreased strength in both arms. The pain is worse with lifting items. He is taking Aleve for the pain with mild relief. Heat sometimes relieves the pain. Denies numbness or tingling. Of note, he has a history of prostate cancer and lung cancer s/p right upper lobectomy.   The patient's past medical history, past surgical history, medications and allergies were reviewed by me today with the patient and documented accordingly. In addition, the patient's family and social history, which were noncontributory to this visit, were reviewed also.

## 2024-05-28 NOTE — ADDENDUM
[FreeTextEntry1] : This note was written by Maritza Campbell on 05/23/2024 acting solely as a scribe for Dr. Christophe Marti.  All medical record entries made by the Scribe were at my, Dr. Christophe Marti, direction and personally dictated by me on 05/23/2024. I have personally reviewed the chart and agree that the record accurately reflects my personal performance of the history, physical exam, assessment and plan.

## 2024-06-17 ENCOUNTER — APPOINTMENT (OUTPATIENT)
Dept: CT IMAGING | Facility: IMAGING CENTER | Age: 69
End: 2024-06-17
Payer: MEDICARE

## 2024-06-17 ENCOUNTER — OUTPATIENT (OUTPATIENT)
Dept: OUTPATIENT SERVICES | Facility: HOSPITAL | Age: 69
LOS: 1 days | End: 2024-06-17
Payer: MEDICARE

## 2024-06-17 DIAGNOSIS — C34.90 MALIGNANT NEOPLASM OF UNSPECIFIED PART OF UNSPECIFIED BRONCHUS OR LUNG: ICD-10-CM

## 2024-06-17 DIAGNOSIS — Z98.89 OTHER SPECIFIED POSTPROCEDURAL STATES: Chronic | ICD-10-CM

## 2024-06-17 PROCEDURE — 71250 CT THORAX DX C-: CPT

## 2024-06-17 PROCEDURE — 71250 CT THORAX DX C-: CPT | Mod: 26,MH

## 2024-07-09 ENCOUNTER — APPOINTMENT (OUTPATIENT)
Dept: THORACIC SURGERY | Facility: CLINIC | Age: 69
End: 2024-07-09
Payer: MEDICARE

## 2024-07-09 ENCOUNTER — NON-APPOINTMENT (OUTPATIENT)
Age: 69
End: 2024-07-09

## 2024-07-09 VITALS
HEIGHT: 66 IN | DIASTOLIC BLOOD PRESSURE: 86 MMHG | SYSTOLIC BLOOD PRESSURE: 128 MMHG | OXYGEN SATURATION: 97 % | WEIGHT: 183 LBS | RESPIRATION RATE: 16 BRPM | BODY MASS INDEX: 29.41 KG/M2 | HEART RATE: 82 BPM

## 2024-07-09 DIAGNOSIS — C34.90 MALIGNANT NEOPLASM OF UNSPECIFIED PART OF UNSPECIFIED BRONCHUS OR LUNG: ICD-10-CM

## 2024-07-09 PROCEDURE — 99213 OFFICE O/P EST LOW 20 MIN: CPT

## 2024-07-12 NOTE — ED ADULT NURSE NOTE - ED STAT RN HANDOFF DETAILS
Problem: At Risk for Falls  Goal: Patient does not fall  Outcome: Monitoring/Evaluating progress  Goal: Patient takes action to control fall-related risks  Outcome: Monitoring/Evaluating progress     Problem: At Risk for Injury Due to Fall  Goal: Patient does not fall  Outcome: Monitoring/Evaluating progress  Goal: Takes action to control condition specific risks  Outcome: Monitoring/Evaluating progress  Goal: Verbalizes understanding of fall-related injury personal risks  Description: Document education using the patient education activity  Outcome: Monitoring/Evaluating progress     Problem: Fluid Volume Excess  Goal: Fluid Volume Excess Symptoms Resolved  Description: Treatment often consists of oxygen and respiratory support with diuretic therapy at doses that exceed usual dose (typically doubled).  Monitor patient response to treatment.    Acute dyspnea should resolve quickly if dose is adequate and kidney function is adequate. Dyspnea/SOB should only be observed with Activity after effective treatment. Patient should be able to lie down comfortably, without SOB.  Outcome: Monitoring/Evaluating progress  Goal: Oxygenation is maintained (SpO2 greater than or equal to 90% or as ordered)  Outcome: Monitoring/Evaluating progress  Goal: Verbalizes understanding of FVE management plan  Description: Document on Patient Education Activity  Outcome: Monitoring/Evaluating progress     Problem: Pain  Goal: Acceptable pain level achieved/maintained at rest using appropriate pain scale for the patient  Outcome: Monitoring/Evaluating progress  Goal: Acceptable pain level achieved/maintained with activity using appropriate pain scale for the patient  Outcome: Monitoring/Evaluating progress  Goal: Acceptable pain level achieved/maintained without oversedation  Outcome: Monitoring/Evaluating progress      pt endorsed to PARVEEN Kelly for continuum of care.

## 2024-07-23 ENCOUNTER — RX RENEWAL (OUTPATIENT)
Age: 69
End: 2024-07-23

## 2024-07-30 ENCOUNTER — NON-APPOINTMENT (OUTPATIENT)
Age: 69
End: 2024-07-30

## 2024-08-02 ENCOUNTER — APPOINTMENT (OUTPATIENT)
Dept: CARDIOLOGY | Facility: CLINIC | Age: 69
End: 2024-08-02
Payer: MEDICARE

## 2024-08-02 VITALS
OXYGEN SATURATION: 95 % | SYSTOLIC BLOOD PRESSURE: 110 MMHG | DIASTOLIC BLOOD PRESSURE: 72 MMHG | TEMPERATURE: 98 F | HEART RATE: 81 BPM | WEIGHT: 185.44 LBS | RESPIRATION RATE: 16 BRPM | HEIGHT: 66 IN | BODY MASS INDEX: 29.8 KG/M2

## 2024-08-02 DIAGNOSIS — E11.9 TYPE 2 DIABETES MELLITUS W/OUT COMPLICATIONS: ICD-10-CM

## 2024-08-02 DIAGNOSIS — M54.9 DORSALGIA, UNSPECIFIED: ICD-10-CM

## 2024-08-02 DIAGNOSIS — E78.00 PURE HYPERCHOLESTEROLEMIA, UNSPECIFIED: ICD-10-CM

## 2024-08-02 DIAGNOSIS — C61 MALIGNANT NEOPLASM OF PROSTATE: ICD-10-CM

## 2024-08-02 DIAGNOSIS — C34.90 MALIGNANT NEOPLASM OF UNSPECIFIED PART OF UNSPECIFIED BRONCHUS OR LUNG: ICD-10-CM

## 2024-08-02 DIAGNOSIS — M06.9 RHEUMATOID ARTHRITIS, UNSPECIFIED: ICD-10-CM

## 2024-08-02 DIAGNOSIS — I10 ESSENTIAL (PRIMARY) HYPERTENSION: ICD-10-CM

## 2024-08-02 DIAGNOSIS — I25.10 ATHEROSCLEROTIC HEART DISEASE OF NATIVE CORONARY ARTERY W/OUT ANGINA PECTORIS: ICD-10-CM

## 2024-08-02 DIAGNOSIS — R22.40 LOCALIZED SWELLING, MASS AND LUMP, UNSPECIFIED LOWER LIMB: ICD-10-CM

## 2024-08-02 DIAGNOSIS — Z13.228 ENCOUNTER FOR SCREENING FOR OTHER METABOLIC DISORDERS: ICD-10-CM

## 2024-08-02 DIAGNOSIS — G47.33 OBSTRUCTIVE SLEEP APNEA (ADULT) (PEDIATRIC): ICD-10-CM

## 2024-08-02 DIAGNOSIS — E61.1 IRON DEFICIENCY: ICD-10-CM

## 2024-08-02 DIAGNOSIS — J44.9 CHRONIC OBSTRUCTIVE PULMONARY DISEASE, UNSPECIFIED: ICD-10-CM

## 2024-08-02 PROCEDURE — 99214 OFFICE O/P EST MOD 30 MIN: CPT

## 2024-08-02 PROCEDURE — 93000 ELECTROCARDIOGRAM COMPLETE: CPT

## 2024-08-02 PROCEDURE — G2211 COMPLEX E/M VISIT ADD ON: CPT

## 2024-08-02 NOTE — PHYSICAL EXAM
[Well Developed] : well developed [Well Nourished] : well nourished [No Acute Distress] : no acute distress [Normal Conjunctiva] : normal conjunctiva [Normal Venous Pressure] : normal venous pressure [No Carotid Bruit] : no carotid bruit [Normal S1, S2] : normal S1, S2 [No Murmur] : no murmur [No Rub] : no rub [No Gallop] : no gallop [Clear Lung Fields] : clear lung fields [Good Air Entry] : good air entry [No Respiratory Distress] : no respiratory distress  [Soft] : abdomen soft [Non Tender] : non-tender [No Masses/organomegaly] : no masses/organomegaly [Normal Bowel Sounds] : normal bowel sounds [Normal Gait] : normal gait [No Edema] : no edema [No Cyanosis] : no cyanosis [No Varicosities] : no varicosities [No Clubbing] : no clubbing [No Rash] : no rash [No Skin Lesions] : no skin lesions [Moves all extremities] : moves all extremities [No Focal Deficits] : no focal deficits [Normal Speech] : normal speech [Alert and Oriented] : alert and oriented [Normal memory] : normal memory [de-identified] : 3x3 lump right hip area

## 2024-08-02 NOTE — HISTORY OF PRESENT ILLNESS
[FreeTextEntry1] : This is a 68 year y/o male with a PMHx of Adenocarcinoma of the lung and prostate, Obesity presents today for follow up. He reports of right upper thigh/hip skin lump , which has been there for about  6 mths  no pain no recent trauma  Patient denies chest pain, dyspnea, palpitations, dizziness, syncope, changes in bowel/bladder habits or appetite

## 2024-08-08 LAB
ALBUMIN SERPL ELPH-MCNC: 4.3 G/DL
ALP BLD-CCNC: 77 U/L
ALT SERPL-CCNC: 33 U/L
ANION GAP SERPL CALC-SCNC: 15 MMOL/L
APPEARANCE: CLEAR
AST SERPL-CCNC: 26 U/L
BACTERIA: NEGATIVE /HPF
BASOPHILS # BLD AUTO: 0.04 K/UL
BASOPHILS NFR BLD AUTO: 0.5 %
BILIRUB DIRECT SERPL-MCNC: 0.1 MG/DL
BILIRUB INDIRECT SERPL-MCNC: 0.3 MG/DL
BILIRUB SERPL-MCNC: 0.4 MG/DL
BILIRUBIN URINE: NEGATIVE
BLOOD URINE: NEGATIVE
BUN SERPL-MCNC: 15 MG/DL
CALCIUM SERPL-MCNC: 9.6 MG/DL
CAST: 0 /LPF
CHLORIDE SERPL-SCNC: 101 MMOL/L
CHOLEST SERPL-MCNC: 169 MG/DL
CK SERPL-CCNC: 160 U/L
CO2 SERPL-SCNC: 22 MMOL/L
COLOR: YELLOW
CREAT SERPL-MCNC: 1.12 MG/DL
CREAT SPEC-SCNC: 85 MG/DL
EGFR: 72 ML/MIN/1.73M2
EOSINOPHIL # BLD AUTO: 0.5 K/UL
EOSINOPHIL NFR BLD AUTO: 5.8 %
EPITHELIAL CELLS: 0 /HPF
ESTIMATED AVERAGE GLUCOSE: 123 MG/DL
FERRITIN SERPL-MCNC: 258 NG/ML
FOLATE SERPL-MCNC: >20 NG/ML
GLUCOSE QUALITATIVE U: NEGATIVE MG/DL
GLUCOSE SERPL-MCNC: 92 MG/DL
HBA1C MFR BLD HPLC: 5.9 %
HCT VFR BLD CALC: 42.5 %
HDLC SERPL-MCNC: 74 MG/DL
HGB BLD-MCNC: 13 G/DL
IMM GRANULOCYTES NFR BLD AUTO: 0.2 %
IRON SATN MFR SERPL: 21 %
IRON SERPL-MCNC: 61 UG/DL
KETONES URINE: NEGATIVE MG/DL
LDLC SERPL CALC-MCNC: 78 MG/DL
LEUKOCYTE ESTERASE URINE: NEGATIVE
LYMPHOCYTES # BLD AUTO: 2.71 K/UL
LYMPHOCYTES NFR BLD AUTO: 31.5 %
MAGNESIUM SERPL-MCNC: 2.2 MG/DL
MAN DIFF?: NORMAL
MCHC RBC-ENTMCNC: 27 PG
MCHC RBC-ENTMCNC: 30.6 GM/DL
MCV RBC AUTO: 88.2 FL
MICROALBUMIN 24H UR DL<=1MG/L-MCNC: <1.2 MG/DL
MICROALBUMIN/CREAT 24H UR-RTO: NORMAL MG/G
MICROSCOPIC-UA: NORMAL
MONOCYTES # BLD AUTO: 1.04 K/UL
MONOCYTES NFR BLD AUTO: 12.1 %
NEUTROPHILS # BLD AUTO: 4.28 K/UL
NEUTROPHILS NFR BLD AUTO: 49.9 %
NITRITE URINE: NEGATIVE
NONHDLC SERPL-MCNC: 96 MG/DL
PH URINE: 7
PLATELET # BLD AUTO: 293 K/UL
POTASSIUM SERPL-SCNC: 4.3 MMOL/L
PROT SERPL-MCNC: 7 G/DL
PROTEIN URINE: NEGATIVE MG/DL
RBC # BLD: 4.82 M/UL
RBC # FLD: 14 %
RED BLOOD CELLS URINE: 1 /HPF
SODIUM SERPL-SCNC: 138 MMOL/L
SPECIFIC GRAVITY URINE: 1.01
T3FREE SERPL-MCNC: 3.37 PG/ML
T4 FREE SERPL-MCNC: 1.5 NG/DL
TIBC SERPL-MCNC: 293 UG/DL
TRANSFERRIN SERPL-MCNC: 224 MG/DL
TRIGL SERPL-MCNC: 98 MG/DL
TSH SERPL-ACNC: 2.79 UIU/ML
UIBC SERPL-MCNC: 232 UG/DL
UROBILINOGEN URINE: 0.2 MG/DL
VIT B12 SERPL-MCNC: 1300 PG/ML
WBC # FLD AUTO: 8.59 K/UL
WHITE BLOOD CELLS URINE: 0 /HPF

## 2024-08-31 ENCOUNTER — RX RENEWAL (OUTPATIENT)
Age: 69
End: 2024-08-31

## 2024-09-13 ENCOUNTER — APPOINTMENT (OUTPATIENT)
Dept: ENDOCRINOLOGY | Facility: CLINIC | Age: 69
End: 2024-09-13
Payer: MEDICARE

## 2024-09-13 VITALS
OXYGEN SATURATION: 97 % | HEART RATE: 78 BPM | WEIGHT: 184.13 LBS | DIASTOLIC BLOOD PRESSURE: 78 MMHG | SYSTOLIC BLOOD PRESSURE: 118 MMHG | BODY MASS INDEX: 29.59 KG/M2 | HEIGHT: 66 IN

## 2024-09-13 DIAGNOSIS — E66.9 OBESITY, UNSPECIFIED: ICD-10-CM

## 2024-09-13 DIAGNOSIS — E11.9 TYPE 2 DIABETES MELLITUS W/OUT COMPLICATIONS: ICD-10-CM

## 2024-09-13 DIAGNOSIS — I10 ESSENTIAL (PRIMARY) HYPERTENSION: ICD-10-CM

## 2024-09-13 DIAGNOSIS — E78.00 PURE HYPERCHOLESTEROLEMIA, UNSPECIFIED: ICD-10-CM

## 2024-09-13 LAB
GLUCOSE BLDC GLUCOMTR-MCNC: 99
HBA1C MFR BLD HPLC: 5.8

## 2024-09-13 PROCEDURE — G2211 COMPLEX E/M VISIT ADD ON: CPT

## 2024-09-13 PROCEDURE — 82962 GLUCOSE BLOOD TEST: CPT

## 2024-09-13 PROCEDURE — 83036 HEMOGLOBIN GLYCOSYLATED A1C: CPT | Mod: QW

## 2024-09-13 PROCEDURE — 99214 OFFICE O/P EST MOD 30 MIN: CPT

## 2024-09-13 NOTE — ADDENDUM
[FreeTextEntry1] : POCT glucose testing was carried out today given diabetes diagnosis.   This note was written by Mik Mckeon on 09/13/2024 acting as medical scribe for Dr. Will Knott. I, Dr. Will Knott, have read and attest that all the information, medical decision making and discharge instructions within are true and accurate.

## 2024-09-13 NOTE — HISTORY OF PRESENT ILLNESS
[FreeTextEntry1] : Mr. WALLS is a 69 year old male who returns today in follow up with regard to a history of type 2 diabetes mellitus. The diabetes mellitus has been present for over four years.  There is no known history of retinopathy, nephropathy. He too denies any history of neuropathy.  Started on Ozempic October 2022. Continues on 0.5 mg sc weekly. Tolerating well.  Current weight:   HGM has shown to be ranging 100s-130s throughout the day.    There has been no significant hypoglycemia.   Optho neg-Dr. yLric Ferris  Hx of Prostate Ca dx 2014-dormant -Too with hx of bph.  He denies any chest pain, sob, neurologic or ophthalmologic complaints. He too denies any new podiatric concerns. He is up to date with his ophthalmologic visit. Notes some itching and some numbness in hands bilaterally. UTD with with optho-no retinopathy. Following with Dr. Ferris.   POCT glucose returned today at 99.   He is physically active with walking.   Additional medical history includes that of Prostate Ca dx 8/14-low grade Too with cad hx 9 stents 2 more placed last February, hyperlipidemia, Minor mi, htn and COPD and hx of adenocarcinoma lung 2013 s/p rt upper lobectomy., vitamin d deficiency

## 2024-09-19 ENCOUNTER — APPOINTMENT (OUTPATIENT)
Dept: ORTHOPEDIC SURGERY | Facility: CLINIC | Age: 69
End: 2024-09-19
Payer: MEDICARE

## 2024-09-19 VITALS — WEIGHT: 184 LBS | BODY MASS INDEX: 29.57 KG/M2 | HEIGHT: 66 IN

## 2024-09-19 DIAGNOSIS — M25.511 PAIN IN RIGHT SHOULDER: ICD-10-CM

## 2024-09-19 DIAGNOSIS — M25.512 PAIN IN RIGHT SHOULDER: ICD-10-CM

## 2024-09-19 PROCEDURE — 99213 OFFICE O/P EST LOW 20 MIN: CPT

## 2024-09-23 NOTE — DISCUSSION/SUMMARY
[de-identified] : 70 y/o male with bilateral shoulder pain; R>L.   Patient presents for follow-up of chronic bilateral shoulder pain. He is doing well at this time and clinically has good range of motion and strength to both shoulders. I reviewed the [facility] pathology of the right shoulder with the patient. We again discussed conservative management vs. surgical intervention. Conservative management is recommended at this time given continued improvement of symptoms.  However, given pathology within the shoulder if symptoms were to regress then further intervention including possible surgical management may be required.  Patient voiced understanding.  Recommendations: Continue PT transition to HEP as tolerated. Continue conservative modalities (overhead activity rest/activity avoidance until less symptomatic, ice, NSAIDs if able, strengthening and conditioning program).   Follow-up: If symptoms progress or persist for additional treatment as needed.

## 2024-09-23 NOTE — HISTORY OF PRESENT ILLNESS
[de-identified] : 68 year old LHD gentleman presenting for follow up of bilateral shoulder pain. He started PT in August and reports 50% improvemnt of pain since last visit. The pain is intermittent brought on with sudden movements, and lifting. He notes improvement of strength in both arms. T He is taking Aleve for the pain with mild relief. Heat sometimes relieves the pain. Denies numbness or tingling. Of note, he has a history of prostate cancer and lung cancer s/p right upper lobectomy.   The patient's past medical history, past surgical history, medications and allergies were reviewed by me today with the patient and documented accordingly. In addition, the patient's family and social history, which were noncontributory to this visit, were reviewed also.

## 2024-09-23 NOTE — PHYSICAL EXAM
[de-identified] : RIght shoulder exam:   Inspection: No malalignment, No defects, No atrophy Skin: No masses, No lesions Neck: Negative Spurling, full ROM, no pain with ROM AROM: FF to 180, abduction to 90, ER to 80, IR to upper lumbar Painful arc ROM: Pain with terminal motion Tenderness: No bicipital tenderness, no tenderness to greater tuberosity/RTC insertion, no anterior shoulder/lesser tuberosity tenderness Strength: 5/5 ER, 5/5 IR in adduction, 5/5 supraspinatus testing, negative Hood's test AC joint: No TTP/pain with cross arm testing Biceps: Speed Negative, Yergason Negative Impingement test: + Denton, + Neer Vasc: 2+ radial pulse Stability: Stable Neuro: AIN, PIN, Ulnar nerve intact to motor Sensation: Intact to light touch throughout Other findings: None.   Left shoulder exam:   Inspection: No malalignment, No defects, No atrophy Skin: No masses, No lesions Neck: Negative Spurling, full ROM, no pain with ROM AROM: FF to 180, abduction to 90, ER to 80, IR to upper lumbar Painful arc ROM: Pain with terminal motion Tenderness: No bicipital tenderness, no tenderness to greater tuberosity/RTC insertion, no anterior shoulder/lesser tuberosity tenderness Strength: 5/5 ER, 5/5 IR in adduction, 5/5 supraspinatus testing, negative Hood's test AC joint: No TTP/pain with cross arm testing Biceps: Speed Negative, Yergason Negative Impingement test: + Denton, + Neer Vasc: 2+ radial pulse Stability: Stable Neuro: AIN, PIN, Ulnar nerve intact to motor Sensation: Intact to light touch throughout Other findings: None.  [de-identified] : 3 views of left shoulder were obtained 04/24/2024, that show no acute fracture or dislocation. There is no glenohumeral and mild AC joint degenerative change seen. Type II acromion. Os Acrominion. Post-surgical changes, evidence of RTC repair and biceps tenodesis.    3 views of right shoulder were obtained 04/24/2024, that show no acute fracture or dislocation. There is no glenohumeral and mild AC joint degenerative change seen. Type II acromion.  Os acromion.  There is no significant malalignment. No significant other obvious osseous abnormality, otherwise unremarkable.   MRI right shoulder dated 05/12/2024 shows severe tendinosis of the intra-articular portion of the long head biceps tendon with intrasubstance tearing at the biceps anchor. Findings suggestive of biceps tenosynovitis. Moderate partial thickness articular surface tearing extending from the mid insertion of the supraspinatus tendon to the junctional insertional fibers of the supraspinatus/infraspinatus tendon. Moderate tendinosis of the subscapularis tendon with intrasubstance tearing at the mid/cranial insertional fibers.  MRI left shoulder dated 05/12/2024 shows Small focus of calcific tendinosis of the caudal insertional fibers of the subscapularis tendon with mild peritendinous edema and subacromial/subdeltoid bursitis. Status post rotator cuff repair. The anterior to mid fibers of the infraspinatus tendon are attenuated and poorly defined. This is likely related to some combination of chronic recurrent tearing with granulation tissue and tendinosis. No retracted tear fluid-filled gap is seen.  We independently reviewed and discussed in detail the images and the radiologic reports with the patient.

## 2024-09-23 NOTE — HISTORY OF PRESENT ILLNESS
[de-identified] : 68 year old LHD gentleman presenting for follow up of bilateral shoulder pain. He started PT in August and reports 50% improvemnt of pain since last visit. The pain is intermittent brought on with sudden movements, and lifting. He notes improvement of strength in both arms. T He is taking Aleve for the pain with mild relief. Heat sometimes relieves the pain. Denies numbness or tingling. Of note, he has a history of prostate cancer and lung cancer s/p right upper lobectomy.   The patient's past medical history, past surgical history, medications and allergies were reviewed by me today with the patient and documented accordingly. In addition, the patient's family and social history, which were noncontributory to this visit, were reviewed also.

## 2024-09-23 NOTE — ADDENDUM
[FreeTextEntry1] : This note was written by Stephanie Salcedo on 09/19/2024 acting solely as a scribe for Dr. Christophe Marti.   All medical record entries made by the Scribe were at my, Dr. Christophe Marti, direction and personally dictated by me on 09/19/2024. I have personally reviewed the chart and agree that the record accurately reflects my personal performance of the history, physical exam, assessment and plan.

## 2024-09-23 NOTE — DISCUSSION/SUMMARY
[de-identified] : 68 y/o male with bilateral shoulder pain; R>L.   Patient presents for follow-up of chronic bilateral shoulder pain. He is doing well at this time and clinically has good range of motion and strength to both shoulders. I reviewed the [facility] pathology of the right shoulder with the patient. We again discussed conservative management vs. surgical intervention. Conservative management is recommended at this time given continued improvement of symptoms.  However, given pathology within the shoulder if symptoms were to regress then further intervention including possible surgical management may be required.  Patient voiced understanding.  Recommendations: Continue PT transition to HEP as tolerated. Continue conservative modalities (overhead activity rest/activity avoidance until less symptomatic, ice, NSAIDs if able, strengthening and conditioning program).   Follow-up: If symptoms progress or persist for additional treatment as needed.

## 2024-09-23 NOTE — PHYSICAL EXAM
[de-identified] : RIght shoulder exam:   Inspection: No malalignment, No defects, No atrophy Skin: No masses, No lesions Neck: Negative Spurling, full ROM, no pain with ROM AROM: FF to 180, abduction to 90, ER to 80, IR to upper lumbar Painful arc ROM: Pain with terminal motion Tenderness: No bicipital tenderness, no tenderness to greater tuberosity/RTC insertion, no anterior shoulder/lesser tuberosity tenderness Strength: 5/5 ER, 5/5 IR in adduction, 5/5 supraspinatus testing, negative Reeves's test AC joint: No TTP/pain with cross arm testing Biceps: Speed Negative, Yergason Negative Impingement test: + Denton, + Neer Vasc: 2+ radial pulse Stability: Stable Neuro: AIN, PIN, Ulnar nerve intact to motor Sensation: Intact to light touch throughout Other findings: None.   Left shoulder exam:   Inspection: No malalignment, No defects, No atrophy Skin: No masses, No lesions Neck: Negative Spurling, full ROM, no pain with ROM AROM: FF to 180, abduction to 90, ER to 80, IR to upper lumbar Painful arc ROM: Pain with terminal motion Tenderness: No bicipital tenderness, no tenderness to greater tuberosity/RTC insertion, no anterior shoulder/lesser tuberosity tenderness Strength: 5/5 ER, 5/5 IR in adduction, 5/5 supraspinatus testing, negative Reeves's test AC joint: No TTP/pain with cross arm testing Biceps: Speed Negative, Yergason Negative Impingement test: + Denton, + Neer Vasc: 2+ radial pulse Stability: Stable Neuro: AIN, PIN, Ulnar nerve intact to motor Sensation: Intact to light touch throughout Other findings: None.  [de-identified] : 3 views of left shoulder were obtained 04/24/2024, that show no acute fracture or dislocation. There is no glenohumeral and mild AC joint degenerative change seen. Type II acromion. Os Acrominion. Post-surgical changes, evidence of RTC repair and biceps tenodesis.    3 views of right shoulder were obtained 04/24/2024, that show no acute fracture or dislocation. There is no glenohumeral and mild AC joint degenerative change seen. Type II acromion.  Os acromion.  There is no significant malalignment. No significant other obvious osseous abnormality, otherwise unremarkable.   MRI right shoulder dated 05/12/2024 shows severe tendinosis of the intra-articular portion of the long head biceps tendon with intrasubstance tearing at the biceps anchor. Findings suggestive of biceps tenosynovitis. Moderate partial thickness articular surface tearing extending from the mid insertion of the supraspinatus tendon to the junctional insertional fibers of the supraspinatus/infraspinatus tendon. Moderate tendinosis of the subscapularis tendon with intrasubstance tearing at the mid/cranial insertional fibers.  MRI left shoulder dated 05/12/2024 shows Small focus of calcific tendinosis of the caudal insertional fibers of the subscapularis tendon with mild peritendinous edema and subacromial/subdeltoid bursitis. Status post rotator cuff repair. The anterior to mid fibers of the infraspinatus tendon are attenuated and poorly defined. This is likely related to some combination of chronic recurrent tearing with granulation tissue and tendinosis. No retracted tear fluid-filled gap is seen.  We independently reviewed and discussed in detail the images and the radiologic reports with the patient.

## 2024-10-04 ENCOUNTER — APPOINTMENT (OUTPATIENT)
Dept: PULMONOLOGY | Facility: CLINIC | Age: 69
End: 2024-10-04

## 2024-10-04 ENCOUNTER — APPOINTMENT (OUTPATIENT)
Dept: PULMONOLOGY | Facility: CLINIC | Age: 69
End: 2024-10-04
Payer: MEDICARE

## 2024-10-04 VITALS
OXYGEN SATURATION: 98 % | DIASTOLIC BLOOD PRESSURE: 71 MMHG | HEART RATE: 79 BPM | SYSTOLIC BLOOD PRESSURE: 112 MMHG | HEIGHT: 66 IN

## 2024-10-04 DIAGNOSIS — G47.00 INSOMNIA, UNSPECIFIED: ICD-10-CM

## 2024-10-04 DIAGNOSIS — C34.90 MALIGNANT NEOPLASM OF UNSPECIFIED PART OF UNSPECIFIED BRONCHUS OR LUNG: ICD-10-CM

## 2024-10-04 DIAGNOSIS — J44.9 CHRONIC OBSTRUCTIVE PULMONARY DISEASE, UNSPECIFIED: ICD-10-CM

## 2024-10-04 DIAGNOSIS — Z23 ENCOUNTER FOR IMMUNIZATION: ICD-10-CM

## 2024-10-04 PROCEDURE — 99214 OFFICE O/P EST MOD 30 MIN: CPT | Mod: 25

## 2024-10-04 PROCEDURE — 90662 IIV NO PRSV INCREASED AG IM: CPT

## 2024-10-04 PROCEDURE — 94010 BREATHING CAPACITY TEST: CPT

## 2024-10-04 PROCEDURE — G0008: CPT

## 2024-10-05 NOTE — DISCUSSION/SUMMARY
[FreeTextEntry1] : Status post resection of lung carcinoma.  Clinically stable. TAYA severe AHI RDI 57 on CPAP good compliance  COPD clinically without significant change in symptom complex.  Mild decrease in flow rates.  Will follow. Overweight Insomnia cont trazodone prn

## 2024-10-05 NOTE — PROCEDURE
[FreeTextEntry1] : 10/04/2024 Pulmonary function testing These data demonstrate a mild-moderate obstructive ventilatory deficit.  Mild decrease in flow rates compared to March 19, 2024.  Cpap data downloaded and discussed with patient.  Good response.  Some days without compliance.

## 2024-10-05 NOTE — HISTORY OF PRESENT ILLNESS
[Former] : former [TextBox_4] : Feeling OK saw [place] in July  repeat ct 1 year in June 2025 Using CPAP. has new one from last year needs flu shot Taking infrequent Symbicort. No cough or wheeze. No exacerbations On Ozempic lost 25 lbs. Issues with lower back.  Still issues seeing pain management using trazadone prn needs refill     [TextBox_11] : 1 [TextBox_13] : 38 [YearQuit] : 2013

## 2024-10-08 ENCOUNTER — OUTPATIENT (OUTPATIENT)
Dept: OUTPATIENT SERVICES | Facility: HOSPITAL | Age: 69
LOS: 1 days | End: 2024-10-08
Payer: MEDICARE

## 2024-10-08 ENCOUNTER — APPOINTMENT (OUTPATIENT)
Dept: ULTRASOUND IMAGING | Facility: IMAGING CENTER | Age: 69
End: 2024-10-08

## 2024-10-08 ENCOUNTER — RESULT REVIEW (OUTPATIENT)
Age: 69
End: 2024-10-08

## 2024-10-08 DIAGNOSIS — Z98.89 OTHER SPECIFIED POSTPROCEDURAL STATES: Chronic | ICD-10-CM

## 2024-10-08 DIAGNOSIS — Z00.8 ENCOUNTER FOR OTHER GENERAL EXAMINATION: ICD-10-CM

## 2024-10-08 PROCEDURE — 76882 US LMTD JT/FCL EVL NVASC XTR: CPT | Mod: 26,RT

## 2024-10-11 ENCOUNTER — APPOINTMENT (OUTPATIENT)
Dept: ORTHOPEDIC SURGERY | Facility: CLINIC | Age: 69
End: 2024-10-11

## 2024-11-20 PROCEDURE — 76882 US LMTD JT/FCL EVL NVASC XTR: CPT

## 2025-02-07 ENCOUNTER — APPOINTMENT (OUTPATIENT)
Dept: CARDIOLOGY | Facility: CLINIC | Age: 70
End: 2025-02-07
Payer: MEDICARE

## 2025-02-07 VITALS
OXYGEN SATURATION: 97 % | HEART RATE: 71 BPM | HEIGHT: 66 IN | SYSTOLIC BLOOD PRESSURE: 140 MMHG | DIASTOLIC BLOOD PRESSURE: 66 MMHG | TEMPERATURE: 98.2 F | BODY MASS INDEX: 30.22 KG/M2 | WEIGHT: 188 LBS

## 2025-02-07 DIAGNOSIS — R01.1 CARDIAC MURMUR, UNSPECIFIED: ICD-10-CM

## 2025-02-07 DIAGNOSIS — E11.9 TYPE 2 DIABETES MELLITUS W/OUT COMPLICATIONS: ICD-10-CM

## 2025-02-07 DIAGNOSIS — G47.00 INSOMNIA, UNSPECIFIED: ICD-10-CM

## 2025-02-07 DIAGNOSIS — I25.10 ATHEROSCLEROTIC HEART DISEASE OF NATIVE CORONARY ARTERY W/OUT ANGINA PECTORIS: ICD-10-CM

## 2025-02-07 DIAGNOSIS — Z13.228 ENCOUNTER FOR SCREENING FOR OTHER METABOLIC DISORDERS: ICD-10-CM

## 2025-02-07 DIAGNOSIS — E61.1 IRON DEFICIENCY: ICD-10-CM

## 2025-02-07 DIAGNOSIS — M79.643 PAIN IN UNSPECIFIED HAND: ICD-10-CM

## 2025-02-07 DIAGNOSIS — Z71.85 ENCOUNTER FOR IMMUNIZATION SAFETY COUNSELING: ICD-10-CM

## 2025-02-07 DIAGNOSIS — Z12.11 ENCOUNTER FOR SCREENING FOR MALIGNANT NEOPLASM OF COLON: ICD-10-CM

## 2025-02-07 DIAGNOSIS — F41.9 ANXIETY DISORDER, UNSPECIFIED: ICD-10-CM

## 2025-02-07 DIAGNOSIS — G47.33 OBSTRUCTIVE SLEEP APNEA (ADULT) (PEDIATRIC): ICD-10-CM

## 2025-02-07 DIAGNOSIS — M06.9 RHEUMATOID ARTHRITIS, UNSPECIFIED: ICD-10-CM

## 2025-02-07 DIAGNOSIS — C34.90 MALIGNANT NEOPLASM OF UNSPECIFIED PART OF UNSPECIFIED BRONCHUS OR LUNG: ICD-10-CM

## 2025-02-07 DIAGNOSIS — M54.9 DORSALGIA, UNSPECIFIED: ICD-10-CM

## 2025-02-07 DIAGNOSIS — R21 RASH AND OTHER NONSPECIFIC SKIN ERUPTION: ICD-10-CM

## 2025-02-07 DIAGNOSIS — M19.90 UNSPECIFIED OSTEOARTHRITIS, UNSPECIFIED SITE: ICD-10-CM

## 2025-02-07 DIAGNOSIS — C61 MALIGNANT NEOPLASM OF PROSTATE: ICD-10-CM

## 2025-02-07 DIAGNOSIS — Z00.00 ENCOUNTER FOR GENERAL ADULT MEDICAL EXAMINATION W/OUT ABNORMAL FINDINGS: ICD-10-CM

## 2025-02-07 DIAGNOSIS — J44.9 CHRONIC OBSTRUCTIVE PULMONARY DISEASE, UNSPECIFIED: ICD-10-CM

## 2025-02-07 DIAGNOSIS — E78.00 PURE HYPERCHOLESTEROLEMIA, UNSPECIFIED: ICD-10-CM

## 2025-02-07 DIAGNOSIS — I10 ESSENTIAL (PRIMARY) HYPERTENSION: ICD-10-CM

## 2025-02-07 PROCEDURE — G2211 COMPLEX E/M VISIT ADD ON: CPT

## 2025-02-07 PROCEDURE — 93000 ELECTROCARDIOGRAM COMPLETE: CPT

## 2025-02-07 PROCEDURE — 99214 OFFICE O/P EST MOD 30 MIN: CPT

## 2025-02-07 RX ORDER — CLOTRIMAZOLE AND BETAMETHASONE DIPROPIONATE 10; .5 MG/G; MG/G
1-0.05 CREAM TOPICAL TWICE DAILY
Qty: 1 | Refills: 3 | Status: ACTIVE | COMMUNITY
Start: 2025-02-07 | End: 1900-01-01

## 2025-02-08 LAB
25(OH)D3 SERPL-MCNC: 49.8 NG/ML
ALBUMIN SERPL ELPH-MCNC: 4.3 G/DL
ALP BLD-CCNC: 86 U/L
ALT SERPL-CCNC: 35 U/L
ANION GAP SERPL CALC-SCNC: 14 MMOL/L
APPEARANCE: ABNORMAL
AST SERPL-CCNC: 24 U/L
BACTERIA: NEGATIVE /HPF
BASOPHILS # BLD AUTO: 0.03 K/UL
BASOPHILS NFR BLD AUTO: 0.4 %
BILIRUB DIRECT SERPL-MCNC: 0.1 MG/DL
BILIRUB INDIRECT SERPL-MCNC: 0.3 MG/DL
BILIRUB SERPL-MCNC: 0.4 MG/DL
BILIRUBIN URINE: NEGATIVE
BLOOD URINE: NEGATIVE
BUN SERPL-MCNC: 14 MG/DL
CALCIUM SERPL-MCNC: 9.3 MG/DL
CAST: 0 /LPF
CHLORIDE SERPL-SCNC: 105 MMOL/L
CHOLEST SERPL-MCNC: 171 MG/DL
CK SERPL-CCNC: 274 U/L
CO2 SERPL-SCNC: 22 MMOL/L
COLOR: NORMAL
CREAT SERPL-MCNC: 1.07 MG/DL
CREAT SPEC-SCNC: 253 MG/DL
EGFR: 76 ML/MIN/1.73M2
EOSINOPHIL # BLD AUTO: 0.33 K/UL
EOSINOPHIL NFR BLD AUTO: 4.2 %
EPITHELIAL CELLS: 0 /HPF
ESTIMATED AVERAGE GLUCOSE: 120 MG/DL
FERRITIN SERPL-MCNC: 223 NG/ML
FOLATE SERPL-MCNC: >20 NG/ML
GLUCOSE QUALITATIVE U: NEGATIVE MG/DL
GLUCOSE SERPL-MCNC: 105 MG/DL
HBA1C MFR BLD HPLC: 5.8 %
HCT VFR BLD CALC: 42.1 %
HDLC SERPL-MCNC: 79 MG/DL
HGB BLD-MCNC: 12.6 G/DL
IMM GRANULOCYTES NFR BLD AUTO: 0.3 %
IRON SATN MFR SERPL: 21 %
IRON SERPL-MCNC: 58 UG/DL
KETONES URINE: NEGATIVE MG/DL
LDLC SERPL CALC-MCNC: 78 MG/DL
LEUKOCYTE ESTERASE URINE: NEGATIVE
LYMPHOCYTES # BLD AUTO: 2.93 K/UL
LYMPHOCYTES NFR BLD AUTO: 37.3 %
MAGNESIUM SERPL-MCNC: 2.2 MG/DL
MAN DIFF?: NORMAL
MCHC RBC-ENTMCNC: 26 PG
MCHC RBC-ENTMCNC: 29.9 G/DL
MCV RBC AUTO: 86.8 FL
MICROALBUMIN 24H UR DL<=1MG/L-MCNC: <1.2 MG/DL
MICROALBUMIN/CREAT 24H UR-RTO: NORMAL MG/G
MICROSCOPIC-UA: NORMAL
MONOCYTES # BLD AUTO: 0.86 K/UL
MONOCYTES NFR BLD AUTO: 10.9 %
NEUTROPHILS # BLD AUTO: 3.69 K/UL
NEUTROPHILS NFR BLD AUTO: 46.9 %
NITRITE URINE: NEGATIVE
NONHDLC SERPL-MCNC: 92 MG/DL
PH URINE: 5.5
PLATELET # BLD AUTO: 297 K/UL
POTASSIUM SERPL-SCNC: 3.8 MMOL/L
PROT SERPL-MCNC: 6.9 G/DL
PROTEIN URINE: NEGATIVE MG/DL
RBC # BLD: 4.85 M/UL
RBC # FLD: 14.3 %
RED BLOOD CELLS URINE: 1 /HPF
REVIEW: NORMAL
SODIUM SERPL-SCNC: 141 MMOL/L
SPECIFIC GRAVITY URINE: 1.03
T4 FREE SERPL-MCNC: 1.3 NG/DL
TIBC SERPL-MCNC: 279 UG/DL
TRIGL SERPL-MCNC: 77 MG/DL
TSH SERPL-ACNC: 1.32 UIU/ML
UIBC SERPL-MCNC: 221 UG/DL
UROBILINOGEN URINE: 0.2 MG/DL
VIT B12 SERPL-MCNC: 989 PG/ML
WBC # FLD AUTO: 7.86 K/UL
WHITE BLOOD CELLS URINE: 0 /HPF

## 2025-02-20 NOTE — ASSESSMENT
[FreeTextEntry1] : Continue auto cpap Resmed with air fit 20 F 20 med full face Continue Symbicort 160 2 puffs PRN. Continue program of wt. loss/exercise Seeing Dr Osborne.  CT as per CT surgery. Continue trazodone as needed. Follow-up in 6 months or sooner on a as needed basis.    35 minutes spent in evaluation management and discussion   Quality 226: Preventive Care And Screening: Tobacco Use: Screening And Cessation Intervention: Patient screened for tobacco use and is an ex/non-smoker Detail Level: Detailed

## 2025-02-27 ENCOUNTER — APPOINTMENT (OUTPATIENT)
Dept: ENDOCRINOLOGY | Facility: CLINIC | Age: 70
End: 2025-02-27

## 2025-03-11 ENCOUNTER — TRANSCRIPTION ENCOUNTER (OUTPATIENT)
Age: 70
End: 2025-03-11

## 2025-03-19 ENCOUNTER — APPOINTMENT (OUTPATIENT)
Dept: CARDIOLOGY | Facility: CLINIC | Age: 70
End: 2025-03-19
Payer: MEDICARE

## 2025-03-19 VITALS
DIASTOLIC BLOOD PRESSURE: 78 MMHG | SYSTOLIC BLOOD PRESSURE: 130 MMHG | HEART RATE: 95 BPM | TEMPERATURE: 98.4 F | OXYGEN SATURATION: 97 % | WEIGHT: 187 LBS | BODY MASS INDEX: 30.05 KG/M2 | HEIGHT: 66 IN

## 2025-03-19 DIAGNOSIS — G47.33 OBSTRUCTIVE SLEEP APNEA (ADULT) (PEDIATRIC): ICD-10-CM

## 2025-03-19 DIAGNOSIS — I25.10 ATHEROSCLEROTIC HEART DISEASE OF NATIVE CORONARY ARTERY W/OUT ANGINA PECTORIS: ICD-10-CM

## 2025-03-19 DIAGNOSIS — E78.00 PURE HYPERCHOLESTEROLEMIA, UNSPECIFIED: ICD-10-CM

## 2025-03-19 DIAGNOSIS — R01.1 CARDIAC MURMUR, UNSPECIFIED: ICD-10-CM

## 2025-03-19 DIAGNOSIS — R21 RASH AND OTHER NONSPECIFIC SKIN ERUPTION: ICD-10-CM

## 2025-03-19 DIAGNOSIS — M19.90 UNSPECIFIED OSTEOARTHRITIS, UNSPECIFIED SITE: ICD-10-CM

## 2025-03-19 DIAGNOSIS — Z01.818 ENCOUNTER FOR OTHER PREPROCEDURAL EXAMINATION: ICD-10-CM

## 2025-03-19 DIAGNOSIS — I10 ESSENTIAL (PRIMARY) HYPERTENSION: ICD-10-CM

## 2025-03-19 DIAGNOSIS — E61.1 IRON DEFICIENCY: ICD-10-CM

## 2025-03-19 DIAGNOSIS — C34.90 MALIGNANT NEOPLASM OF UNSPECIFIED PART OF UNSPECIFIED BRONCHUS OR LUNG: ICD-10-CM

## 2025-03-19 DIAGNOSIS — G47.00 INSOMNIA, UNSPECIFIED: ICD-10-CM

## 2025-03-19 DIAGNOSIS — M79.643 PAIN IN UNSPECIFIED HAND: ICD-10-CM

## 2025-03-19 DIAGNOSIS — K21.9 GASTRO-ESOPHAGEAL REFLUX DISEASE W/OUT ESOPHAGITIS: ICD-10-CM

## 2025-03-19 DIAGNOSIS — E11.9 TYPE 2 DIABETES MELLITUS W/OUT COMPLICATIONS: ICD-10-CM

## 2025-03-19 DIAGNOSIS — F41.9 ANXIETY DISORDER, UNSPECIFIED: ICD-10-CM

## 2025-03-19 DIAGNOSIS — C61 MALIGNANT NEOPLASM OF PROSTATE: ICD-10-CM

## 2025-03-19 DIAGNOSIS — J44.9 CHRONIC OBSTRUCTIVE PULMONARY DISEASE, UNSPECIFIED: ICD-10-CM

## 2025-03-19 PROCEDURE — G2211 COMPLEX E/M VISIT ADD ON: CPT

## 2025-03-19 PROCEDURE — 99214 OFFICE O/P EST MOD 30 MIN: CPT

## 2025-03-19 PROCEDURE — 93000 ELECTROCARDIOGRAM COMPLETE: CPT

## 2025-03-19 PROCEDURE — 93306 TTE W/DOPPLER COMPLETE: CPT

## 2025-03-19 RX ORDER — FAMOTIDINE 40 MG/1
40 TABLET, FILM COATED ORAL
Refills: 0 | Status: ACTIVE | COMMUNITY

## 2025-03-20 ENCOUNTER — NON-APPOINTMENT (OUTPATIENT)
Age: 70
End: 2025-03-20

## 2025-03-20 DIAGNOSIS — R89.9 UNSPECIFIED ABNORMAL FINDING IN SPECIMENS FROM OTHER ORGANS, SYSTEMS AND TISSUES: ICD-10-CM

## 2025-03-20 LAB
ANION GAP SERPL CALC-SCNC: 13 MMOL/L
APPEARANCE: CLEAR
BACTERIA: NEGATIVE /HPF
BASOPHILS # BLD AUTO: 0.03 K/UL
BASOPHILS NFR BLD AUTO: 0.4 %
BILIRUBIN URINE: NEGATIVE
BLOOD URINE: NEGATIVE
BUN SERPL-MCNC: 18 MG/DL
CALCIUM SERPL-MCNC: 9.4 MG/DL
CAST: 3 /LPF
CHLORIDE SERPL-SCNC: 108 MMOL/L
CO2 SERPL-SCNC: 24 MMOL/L
COLOR: YELLOW
CREAT SERPL-MCNC: 1.33 MG/DL
EGFRCR SERPLBLD CKD-EPI 2021: 58 ML/MIN/1.73M2
EOSINOPHIL # BLD AUTO: 0.15 K/UL
EOSINOPHIL NFR BLD AUTO: 2 %
EPITHELIAL CELLS: 1 /HPF
ESTIMATED AVERAGE GLUCOSE: 123 MG/DL
GLUCOSE QUALITATIVE U: NEGATIVE MG/DL
GLUCOSE SERPL-MCNC: 92 MG/DL
HBA1C MFR BLD HPLC: 5.9 %
HCT VFR BLD CALC: 41.7 %
HGB BLD-MCNC: 12.9 G/DL
IMM GRANULOCYTES NFR BLD AUTO: 0.1 %
KETONES URINE: NEGATIVE MG/DL
LEUKOCYTE ESTERASE URINE: NEGATIVE
LYMPHOCYTES # BLD AUTO: 3.04 K/UL
LYMPHOCYTES NFR BLD AUTO: 39.6 %
MAN DIFF?: NORMAL
MCHC RBC-ENTMCNC: 27.2 PG
MCHC RBC-ENTMCNC: 30.9 G/DL
MCV RBC AUTO: 87.8 FL
MICROSCOPIC-UA: NORMAL
MONOCYTES # BLD AUTO: 0.87 K/UL
MONOCYTES NFR BLD AUTO: 11.3 %
NEUTROPHILS # BLD AUTO: 3.58 K/UL
NEUTROPHILS NFR BLD AUTO: 46.6 %
NITRITE URINE: NEGATIVE
PH URINE: 5.5
PLATELET # BLD AUTO: 320 K/UL
POTASSIUM SERPL-SCNC: 4.1 MMOL/L
PROTEIN URINE: NEGATIVE MG/DL
RBC # BLD: 4.75 M/UL
RBC # FLD: 14.4 %
RED BLOOD CELLS URINE: 1 /HPF
SODIUM SERPL-SCNC: 144 MMOL/L
SPECIFIC GRAVITY URINE: 1.02
UROBILINOGEN URINE: 0.2 MG/DL
WBC # FLD AUTO: 7.68 K/UL
WHITE BLOOD CELLS URINE: 1 /HPF

## 2025-03-21 ENCOUNTER — NON-APPOINTMENT (OUTPATIENT)
Age: 70
End: 2025-03-21

## 2025-03-21 LAB
ALBUMIN SERPL ELPH-MCNC: 4.2 G/DL
ALP BLD-CCNC: 84 U/L
ALT SERPL-CCNC: 33 U/L
ANION GAP SERPL CALC-SCNC: 18 MMOL/L
AST SERPL-CCNC: 22 U/L
BILIRUB SERPL-MCNC: 0.3 MG/DL
BUN SERPL-MCNC: 13 MG/DL
CALCIUM SERPL-MCNC: 9.1 MG/DL
CHLORIDE SERPL-SCNC: 106 MMOL/L
CO2 SERPL-SCNC: 19 MMOL/L
CREAT SERPL-MCNC: 1.18 MG/DL
EGFRCR SERPLBLD CKD-EPI 2021: 67 ML/MIN/1.73M2
GLUCOSE SERPL-MCNC: 77 MG/DL
POTASSIUM SERPL-SCNC: 4.4 MMOL/L
PROT SERPL-MCNC: 7 G/DL
SODIUM SERPL-SCNC: 143 MMOL/L

## 2025-04-11 ENCOUNTER — APPOINTMENT (OUTPATIENT)
Dept: PULMONOLOGY | Facility: CLINIC | Age: 70
End: 2025-04-11
Payer: MEDICARE

## 2025-04-11 VITALS — HEART RATE: 74 BPM | OXYGEN SATURATION: 94 % | DIASTOLIC BLOOD PRESSURE: 80 MMHG | SYSTOLIC BLOOD PRESSURE: 134 MMHG

## 2025-04-11 DIAGNOSIS — G47.00 INSOMNIA, UNSPECIFIED: ICD-10-CM

## 2025-04-11 DIAGNOSIS — J44.9 CHRONIC OBSTRUCTIVE PULMONARY DISEASE, UNSPECIFIED: ICD-10-CM

## 2025-04-11 DIAGNOSIS — C34.90 MALIGNANT NEOPLASM OF UNSPECIFIED PART OF UNSPECIFIED BRONCHUS OR LUNG: ICD-10-CM

## 2025-04-11 DIAGNOSIS — G47.33 OBSTRUCTIVE SLEEP APNEA (ADULT) (PEDIATRIC): ICD-10-CM

## 2025-04-11 PROCEDURE — 94010 BREATHING CAPACITY TEST: CPT

## 2025-04-11 PROCEDURE — 94727 GAS DIL/WSHOT DETER LNG VOL: CPT

## 2025-04-11 PROCEDURE — 99214 OFFICE O/P EST MOD 30 MIN: CPT | Mod: 25

## 2025-04-11 PROCEDURE — ZZZZZ: CPT

## 2025-04-11 PROCEDURE — 94729 DIFFUSING CAPACITY: CPT

## 2025-05-14 NOTE — ED ADULT TRIAGE NOTE - ARRIVAL FROM
Your goal blood pressure is <130/80       Tips on taking blood pressure readings at at home    Check blood pressure 1-2 hours after taking medications and record readings.    Don't smoke, drink caffeinated beverages or exercise within 30 minutes before measuring your blood pressure. Empty your bladder and ensure at least 5 minutes of quiet rest before measurements.    Don’t take the measurement over clothes.    Sit with your back straight and supported (on a dining chair, rather than a sofa).   Your feet should be flat on the floor and your legs should not be crossed. Your arm should be supported on a flat surface (such as a table) with the upper arm at heart level. Make sure the bottom of the cuff is placed directly above the bend of the elbow. Check your monitor's instructions for an illustration or have your healthcare provider show you how.   Try to sit still during readings      Measure at the same time every day.  It’s important to take the readings at the same time each day, such as morning and evening. It is best to take the readings daily however ideally beginning 2 weeks after a change in treatment and during the week before your next appointment.    Take multiple readings and record the results.  Each time you measure, take two or three readings one minute apart and record the results.  If your monitor has built-in memory to store your readings, take it with you to your appointments. Some monitors may also allow you to upload your readings to a secure website after you register your profile.       Why are we concerned about your blood pressure?    High blood pressure (HBP or hypertension) puts your health and quality of life in danger. You may not experience any symptoms related to your blood pressure for many years until you have a major complication, but even if you are not having symptoms, damage is being done to your body.     Left uncontrolled or undetected, high blood pressure can lead  Home

## 2025-06-20 ENCOUNTER — OUTPATIENT (OUTPATIENT)
Dept: OUTPATIENT SERVICES | Facility: HOSPITAL | Age: 70
LOS: 1 days | End: 2025-06-20
Payer: MEDICARE

## 2025-06-20 ENCOUNTER — APPOINTMENT (OUTPATIENT)
Dept: MRI IMAGING | Facility: IMAGING CENTER | Age: 70
End: 2025-06-20

## 2025-06-20 DIAGNOSIS — Z00.8 ENCOUNTER FOR OTHER GENERAL EXAMINATION: ICD-10-CM

## 2025-06-20 DIAGNOSIS — Z98.89 OTHER SPECIFIED POSTPROCEDURAL STATES: Chronic | ICD-10-CM

## 2025-06-20 PROCEDURE — 72197 MRI PELVIS W/O & W/DYE: CPT | Mod: MH

## 2025-06-20 PROCEDURE — 72197 MRI PELVIS W/O & W/DYE: CPT | Mod: 26

## 2025-06-20 PROCEDURE — A9585: CPT

## 2025-06-20 PROCEDURE — 76498P: CUSTOM | Mod: 26

## 2025-06-20 PROCEDURE — 76498 UNLISTED MR PROCEDURE: CPT

## 2025-07-01 ENCOUNTER — APPOINTMENT (OUTPATIENT)
Dept: CT IMAGING | Facility: IMAGING CENTER | Age: 70
End: 2025-07-01
Payer: MEDICARE

## 2025-07-01 ENCOUNTER — OUTPATIENT (OUTPATIENT)
Dept: OUTPATIENT SERVICES | Facility: HOSPITAL | Age: 70
LOS: 1 days | End: 2025-07-01
Payer: MEDICARE

## 2025-07-01 DIAGNOSIS — C34.90 MALIGNANT NEOPLASM OF UNSPECIFIED PART OF UNSPECIFIED BRONCHUS OR LUNG: ICD-10-CM

## 2025-07-01 DIAGNOSIS — Z98.89 OTHER SPECIFIED POSTPROCEDURAL STATES: Chronic | ICD-10-CM

## 2025-07-01 PROCEDURE — 71250 CT THORAX DX C-: CPT

## 2025-07-01 PROCEDURE — 71250 CT THORAX DX C-: CPT | Mod: 26

## 2025-07-08 ENCOUNTER — APPOINTMENT (OUTPATIENT)
Dept: THORACIC SURGERY | Facility: CLINIC | Age: 70
End: 2025-07-08
Payer: MEDICARE

## 2025-07-08 VITALS
RESPIRATION RATE: 16 BRPM | HEART RATE: 81 BPM | WEIGHT: 182 LBS | BODY MASS INDEX: 29.25 KG/M2 | HEIGHT: 66 IN | SYSTOLIC BLOOD PRESSURE: 143 MMHG | OXYGEN SATURATION: 96 % | DIASTOLIC BLOOD PRESSURE: 77 MMHG

## 2025-07-08 PROCEDURE — 99214 OFFICE O/P EST MOD 30 MIN: CPT

## 2025-07-14 ENCOUNTER — APPOINTMENT (OUTPATIENT)
Dept: CARDIOLOGY | Facility: CLINIC | Age: 70
End: 2025-07-14
Payer: MEDICARE

## 2025-07-14 VITALS
SYSTOLIC BLOOD PRESSURE: 110 MMHG | HEART RATE: 67 BPM | OXYGEN SATURATION: 99 % | HEIGHT: 66 IN | TEMPERATURE: 97.7 F | BODY MASS INDEX: 29.73 KG/M2 | DIASTOLIC BLOOD PRESSURE: 70 MMHG | WEIGHT: 185 LBS

## 2025-07-14 PROCEDURE — 93000 ELECTROCARDIOGRAM COMPLETE: CPT

## 2025-07-14 PROCEDURE — 99214 OFFICE O/P EST MOD 30 MIN: CPT

## 2025-07-14 PROCEDURE — G2211 COMPLEX E/M VISIT ADD ON: CPT

## 2025-07-16 LAB
ALBUMIN SERPL ELPH-MCNC: 4.3 G/DL
ALBUMIN SERPL ELPH-MCNC: 4.3 G/DL
ALP BLD-CCNC: 83 U/L
ALT SERPL-CCNC: 24 U/L
ANION GAP SERPL CALC-SCNC: 15 MMOL/L
APPEARANCE: CLEAR
AST SERPL-CCNC: 24 U/L
BACTERIA: NEGATIVE /HPF
BASOPHILS # BLD AUTO: 0.03 K/UL
BASOPHILS NFR BLD AUTO: 0.5 %
BILIRUB DIRECT SERPL-MCNC: 0.18 MG/DL
BILIRUB INDIRECT SERPL-MCNC: 0.3 MG/DL
BILIRUB SERPL-MCNC: 0.5 MG/DL
BILIRUBIN URINE: NEGATIVE
BLOOD URINE: NEGATIVE
BUN SERPL-MCNC: 14 MG/DL
CALCIUM SERPL-MCNC: 9.4 MG/DL
CAST: 0 /LPF
CHLORIDE SERPL-SCNC: 106 MMOL/L
CHOLEST SERPL-MCNC: 156 MG/DL
CK SERPL-CCNC: 142 U/L
CO2 SERPL-SCNC: 23 MMOL/L
COLOR: YELLOW
CREAT SERPL-MCNC: 1.11 MG/DL
EGFRCR SERPLBLD CKD-EPI 2021: 72 ML/MIN/1.73M2
EOSINOPHIL # BLD AUTO: 0.15 K/UL
EOSINOPHIL NFR BLD AUTO: 2.3 %
EPITHELIAL CELLS: 0 /HPF
ESTIMATED AVERAGE GLUCOSE: 123 MG/DL
FERRITIN SERPL-MCNC: 208 NG/ML
FOLATE SERPL-MCNC: 20 NG/ML
GLUCOSE QUALITATIVE U: NEGATIVE MG/DL
GLUCOSE SERPL-MCNC: 85 MG/DL
HBA1C MFR BLD HPLC: 5.9 %
HCT VFR BLD CALC: 42 %
HDLC SERPL-MCNC: 70 MG/DL
HGB BLD-MCNC: 12.5 G/DL
IMM GRANULOCYTES NFR BLD AUTO: 0.2 %
IRON SATN MFR SERPL: 21 %
IRON SERPL-MCNC: 57 UG/DL
KETONES URINE: NEGATIVE MG/DL
LDLC SERPL-MCNC: 70 MG/DL
LEUKOCYTE ESTERASE URINE: ABNORMAL
LYMPHOCYTES # BLD AUTO: 2.87 K/UL
LYMPHOCYTES NFR BLD AUTO: 43.2 %
MAGNESIUM SERPL-MCNC: 2.2 MG/DL
MAN DIFF?: NORMAL
MCHC RBC-ENTMCNC: 26.5 PG
MCHC RBC-ENTMCNC: 29.8 G/DL
MCV RBC AUTO: 89.2 FL
MICROSCOPIC-UA: NORMAL
MONOCYTES # BLD AUTO: 0.72 K/UL
MONOCYTES NFR BLD AUTO: 10.8 %
NEUTROPHILS # BLD AUTO: 2.87 K/UL
NEUTROPHILS NFR BLD AUTO: 43 %
NITRITE URINE: NEGATIVE
NONHDLC SERPL-MCNC: 87 MG/DL
PH URINE: 6.5
PLATELET # BLD AUTO: 254 K/UL
POTASSIUM SERPL-SCNC: 4 MMOL/L
PROT SERPL-MCNC: 6.7 G/DL
PROTEIN URINE: NEGATIVE MG/DL
RBC # BLD: 4.71 M/UL
RBC # FLD: 14 %
RED BLOOD CELLS URINE: 0 /HPF
SODIUM SERPL-SCNC: 144 MMOL/L
SPECIFIC GRAVITY URINE: 1.02
T3FREE SERPL-MCNC: 3.26 PG/ML
T4 FREE SERPL-MCNC: 1.5 NG/DL
TIBC SERPL-MCNC: 269 UG/DL
TRIGL SERPL-MCNC: 95 MG/DL
TSH SERPL-ACNC: 1.2 UIU/ML
UIBC SERPL-MCNC: 212 UG/DL
UROBILINOGEN URINE: 0.2 MG/DL
VIT B12 SERPL-MCNC: 767 PG/ML
WBC # FLD AUTO: 6.65 K/UL
WHITE BLOOD CELLS URINE: 0 /HPF

## 2025-07-17 ENCOUNTER — NON-APPOINTMENT (OUTPATIENT)
Age: 70
End: 2025-07-17

## 2025-07-17 PROBLEM — R82.90 ABNORMAL URINALYSIS: Status: ACTIVE | Noted: 2025-07-17

## 2025-07-25 LAB
APPEARANCE: CLEAR
BACTERIA: NEGATIVE /HPF
BILIRUBIN URINE: NEGATIVE
BLOOD URINE: NEGATIVE
CAST: 0 /LPF
COLOR: YELLOW
EPITHELIAL CELLS: 0 /HPF
GLUCOSE QUALITATIVE U: NEGATIVE MG/DL
KETONES URINE: NEGATIVE MG/DL
LEUKOCYTE ESTERASE URINE: NEGATIVE
MICROSCOPIC-UA: NORMAL
NITRITE URINE: NEGATIVE
PH URINE: 6.5
PROTEIN URINE: NEGATIVE MG/DL
RED BLOOD CELLS URINE: 0 /HPF
SPECIFIC GRAVITY URINE: 1.01
UROBILINOGEN URINE: 0.2 MG/DL
WHITE BLOOD CELLS URINE: 0 /HPF

## 2025-07-26 LAB — BACTERIA UR CULT: NORMAL

## 2025-07-28 ENCOUNTER — NON-APPOINTMENT (OUTPATIENT)
Age: 70
End: 2025-07-28

## 2025-07-29 ENCOUNTER — APPOINTMENT (OUTPATIENT)
Dept: UROLOGY | Facility: CLINIC | Age: 70
End: 2025-07-29
Payer: MEDICARE

## 2025-07-29 ENCOUNTER — NON-APPOINTMENT (OUTPATIENT)
Age: 70
End: 2025-07-29

## 2025-07-29 VITALS
HEIGHT: 66 IN | OXYGEN SATURATION: 97 % | HEART RATE: 77 BPM | BODY MASS INDEX: 28.93 KG/M2 | DIASTOLIC BLOOD PRESSURE: 68 MMHG | WEIGHT: 180 LBS | SYSTOLIC BLOOD PRESSURE: 104 MMHG

## 2025-07-29 DIAGNOSIS — R93.5 ABNORMAL FINDINGS ON DIAGNOSTIC IMAGING OF OTHER ABDOMINAL REGIONS, INCLUDING RETROPERITONEUM: ICD-10-CM

## 2025-07-29 DIAGNOSIS — C61 MALIGNANT NEOPLASM OF PROSTATE: ICD-10-CM

## 2025-07-29 PROCEDURE — 99204 OFFICE O/P NEW MOD 45 MIN: CPT

## 2025-07-29 PROCEDURE — G2211 COMPLEX E/M VISIT ADD ON: CPT

## 2025-07-29 RX ORDER — TAMSULOSIN HYDROCHLORIDE 0.4 MG/1
CAPSULE ORAL
Refills: 0 | Status: ACTIVE | COMMUNITY

## 2025-08-06 ENCOUNTER — OUTPATIENT (OUTPATIENT)
Dept: OUTPATIENT SERVICES | Facility: HOSPITAL | Age: 70
LOS: 1 days | End: 2025-08-06

## 2025-08-06 VITALS
SYSTOLIC BLOOD PRESSURE: 108 MMHG | WEIGHT: 181 LBS | HEART RATE: 71 BPM | TEMPERATURE: 98 F | HEIGHT: 65 IN | OXYGEN SATURATION: 98 % | DIASTOLIC BLOOD PRESSURE: 67 MMHG | RESPIRATION RATE: 16 BRPM

## 2025-08-06 DIAGNOSIS — E11.9 TYPE 2 DIABETES MELLITUS WITHOUT COMPLICATIONS: ICD-10-CM

## 2025-08-06 DIAGNOSIS — Z98.89 OTHER SPECIFIED POSTPROCEDURAL STATES: Chronic | ICD-10-CM

## 2025-08-06 DIAGNOSIS — G47.33 OBSTRUCTIVE SLEEP APNEA (ADULT) (PEDIATRIC): ICD-10-CM

## 2025-08-06 DIAGNOSIS — C61 MALIGNANT NEOPLASM OF PROSTATE: ICD-10-CM

## 2025-08-06 DIAGNOSIS — I25.10 ATHEROSCLEROTIC HEART DISEASE OF NATIVE CORONARY ARTERY WITHOUT ANGINA PECTORIS: ICD-10-CM

## 2025-08-06 DIAGNOSIS — Z95.5 PRESENCE OF CORONARY ANGIOPLASTY IMPLANT AND GRAFT: Chronic | ICD-10-CM

## 2025-08-06 DIAGNOSIS — I10 ESSENTIAL (PRIMARY) HYPERTENSION: ICD-10-CM

## 2025-08-06 DIAGNOSIS — N40.0 BENIGN PROSTATIC HYPERPLASIA WITHOUT LOWER URINARY TRACT SYMPTOMS: ICD-10-CM

## 2025-08-09 PROBLEM — E11.9 TYPE 2 DIABETES MELLITUS WITHOUT COMPLICATIONS: Chronic | Status: ACTIVE | Noted: 2025-08-06

## 2025-08-09 PROBLEM — J44.9 CHRONIC OBSTRUCTIVE PULMONARY DISEASE, UNSPECIFIED: Chronic | Status: ACTIVE | Noted: 2025-08-06

## 2025-08-09 PROBLEM — J45.909 UNSPECIFIED ASTHMA, UNCOMPLICATED: Chronic | Status: ACTIVE | Noted: 2025-08-06

## 2025-08-09 PROBLEM — N40.0 BENIGN PROSTATIC HYPERPLASIA WITHOUT LOWER URINARY TRACT SYMPTOMS: Chronic | Status: ACTIVE | Noted: 2025-08-06

## 2025-08-11 ENCOUNTER — NON-APPOINTMENT (OUTPATIENT)
Age: 70
End: 2025-08-11

## 2025-08-14 ENCOUNTER — APPOINTMENT (OUTPATIENT)
Dept: UROLOGY | Facility: CLINIC | Age: 70
End: 2025-08-14

## 2025-08-15 ENCOUNTER — OUTPATIENT (OUTPATIENT)
Dept: OUTPATIENT SERVICES | Facility: HOSPITAL | Age: 70
LOS: 1 days | End: 2025-08-15
Payer: MEDICARE

## 2025-08-15 ENCOUNTER — APPOINTMENT (OUTPATIENT)
Dept: UROLOGY | Facility: AMBULATORY SURGERY CENTER | Age: 70
End: 2025-08-15

## 2025-08-15 ENCOUNTER — TRANSCRIPTION ENCOUNTER (OUTPATIENT)
Age: 70
End: 2025-08-15

## 2025-08-15 ENCOUNTER — RESULT REVIEW (OUTPATIENT)
Age: 70
End: 2025-08-15

## 2025-08-15 VITALS
OXYGEN SATURATION: 98 % | HEIGHT: 65 IN | SYSTOLIC BLOOD PRESSURE: 134 MMHG | DIASTOLIC BLOOD PRESSURE: 76 MMHG | TEMPERATURE: 98 F | HEART RATE: 67 BPM | WEIGHT: 181 LBS | RESPIRATION RATE: 17 BRPM

## 2025-08-15 VITALS
OXYGEN SATURATION: 99 % | DIASTOLIC BLOOD PRESSURE: 60 MMHG | SYSTOLIC BLOOD PRESSURE: 124 MMHG | RESPIRATION RATE: 17 BRPM | TEMPERATURE: 98 F | HEART RATE: 78 BPM

## 2025-08-15 DIAGNOSIS — Z98.89 OTHER SPECIFIED POSTPROCEDURAL STATES: Chronic | ICD-10-CM

## 2025-08-15 DIAGNOSIS — C61 MALIGNANT NEOPLASM OF PROSTATE: ICD-10-CM

## 2025-08-15 DIAGNOSIS — Z95.5 PRESENCE OF CORONARY ANGIOPLASTY IMPLANT AND GRAFT: Chronic | ICD-10-CM

## 2025-08-15 LAB — GLUCOSE BLDC GLUCOMTR-MCNC: 100 MG/DL — HIGH (ref 70–99)

## 2025-08-15 PROCEDURE — 76999F: CUSTOM | Mod: 26

## 2025-08-15 PROCEDURE — G0416: CPT | Mod: 26

## 2025-08-15 PROCEDURE — 55706 BX PRST8 NDL SAT SAMPLING: CPT

## 2025-08-15 RX ORDER — TAMSULOSIN HYDROCHLORIDE 0.4 MG/1
1 CAPSULE ORAL
Refills: 0 | DISCHARGE

## 2025-08-15 RX ORDER — MONTELUKAST SODIUM 10 MG/1
1 TABLET ORAL
Refills: 0 | DISCHARGE

## 2025-08-15 RX ORDER — TRAMADOL HYDROCHLORIDE 50 MG/1
1 TABLET, FILM COATED ORAL
Refills: 0 | DISCHARGE

## 2025-08-15 RX ORDER — LOSARTAN POTASSIUM 100 MG/1
1 TABLET, FILM COATED ORAL
Refills: 0 | DISCHARGE

## 2025-08-15 RX ORDER — METOPROLOL SUCCINATE 50 MG/1
1 TABLET, EXTENDED RELEASE ORAL
Refills: 0 | DISCHARGE

## 2025-08-15 RX ORDER — FINASTERIDE 1 MG/1
1 TABLET, FILM COATED ORAL
Refills: 0 | DISCHARGE

## 2025-08-15 RX ORDER — SEMAGLUTIDE 1 MG/.5ML
0.5 INJECTION, SOLUTION SUBCUTANEOUS
Refills: 0 | DISCHARGE

## 2025-08-19 LAB — SURGICAL PATHOLOGY STUDY: SIGNIFICANT CHANGE UP

## 2025-09-04 ENCOUNTER — RX RENEWAL (OUTPATIENT)
Age: 70
End: 2025-09-04

## 2025-09-11 ENCOUNTER — APPOINTMENT (OUTPATIENT)
Dept: UROLOGY | Facility: CLINIC | Age: 70
End: 2025-09-11
Payer: MEDICARE

## 2025-09-11 ENCOUNTER — NON-APPOINTMENT (OUTPATIENT)
Age: 70
End: 2025-09-11

## 2025-09-11 DIAGNOSIS — C61 MALIGNANT NEOPLASM OF PROSTATE: ICD-10-CM

## 2025-09-11 PROCEDURE — 99215 OFFICE O/P EST HI 40 MIN: CPT

## 2025-09-11 PROCEDURE — G2211 COMPLEX E/M VISIT ADD ON: CPT

## (undated) DEVICE — BASIN SET DOUBLE

## (undated) DEVICE — GRID BRACHYTHERAPY EZ 18G

## (undated) DEVICE — CATH ANGIO 14G X 3.25"

## (undated) DEVICE — NDL SPINAL 20G X 6" QUINCKE

## (undated) DEVICE — Device

## (undated) DEVICE — VENODYNE/SCD SLEEVE CALF MEDIUM

## (undated) DEVICE — NEOGUARD ENDOCAVITY PROBE COVER 1 X 11.8"

## (undated) DEVICE — BALLOON ENDOCAVITY 2X14CM

## (undated) DEVICE — SYR LUER LOK 20CC

## (undated) DEVICE — PREP CHLORAPREP HI-LITE ORANGE 3ML

## (undated) DEVICE — DRAPE BACK TABLE COVER 44X90"

## (undated) DEVICE — WARMING BLANKET UPPER ADULT

## (undated) DEVICE — DRSG TELFA 3 X 8

## (undated) DEVICE — NDL MAX CORE 18G X 25CM

## (undated) DEVICE — POSITIONER FOAM EGG CRATE ULNAR 2PCS (PINK)